# Patient Record
Sex: FEMALE | Race: WHITE | NOT HISPANIC OR LATINO | Employment: OTHER | URBAN - METROPOLITAN AREA
[De-identification: names, ages, dates, MRNs, and addresses within clinical notes are randomized per-mention and may not be internally consistent; named-entity substitution may affect disease eponyms.]

---

## 2019-05-26 ENCOUNTER — HOSPITAL ENCOUNTER (INPATIENT)
Facility: HOSPITAL | Age: 59
LOS: 6 days | Discharge: HOME HEALTH | DRG: 602 | End: 2019-06-01
Attending: EMERGENCY MEDICINE | Admitting: INTERNAL MEDICINE
Payer: MEDICARE

## 2019-05-26 ENCOUNTER — APPOINTMENT (INPATIENT)
Dept: RADIOLOGY | Facility: HOSPITAL | Age: 59
DRG: 602 | End: 2019-05-26
Payer: MEDICARE

## 2019-05-26 ENCOUNTER — APPOINTMENT (EMERGENCY)
Dept: RADIOLOGY | Facility: HOSPITAL | Age: 59
DRG: 602 | End: 2019-05-26
Payer: MEDICARE

## 2019-05-26 DIAGNOSIS — M54.9 CHRONIC BACK PAIN: ICD-10-CM

## 2019-05-26 DIAGNOSIS — M79.603 PAIN OF UPPER EXTREMITY, UNSPECIFIED LATERALITY: ICD-10-CM

## 2019-05-26 DIAGNOSIS — F19.10 IV DRUG ABUSE (HCC): ICD-10-CM

## 2019-05-26 DIAGNOSIS — M21.371 RIGHT FOOT DROP: ICD-10-CM

## 2019-05-26 DIAGNOSIS — F17.200 TOBACCO USE DISORDER: ICD-10-CM

## 2019-05-26 DIAGNOSIS — A41.01 MSSA (METHICILLIN SUSCEPTIBLE STAPHYLOCOCCUS AUREUS) SEPTICEMIA (HCC): ICD-10-CM

## 2019-05-26 DIAGNOSIS — L02.414 ABSCESS OF FOREARM, LEFT: ICD-10-CM

## 2019-05-26 DIAGNOSIS — S31.809A GLUTEAL CLEFT WOUND: Primary | ICD-10-CM

## 2019-05-26 DIAGNOSIS — L03.114 CELLULITIS OF LEFT ARM: ICD-10-CM

## 2019-05-26 DIAGNOSIS — M51.37 DDD (DEGENERATIVE DISC DISEASE), LUMBOSACRAL: ICD-10-CM

## 2019-05-26 DIAGNOSIS — F19.90 IVDU (INTRAVENOUS DRUG USER): ICD-10-CM

## 2019-05-26 DIAGNOSIS — M46.46 DISCITIS OF LUMBAR REGION: ICD-10-CM

## 2019-05-26 DIAGNOSIS — F14.10 COCAINE ABUSE (HCC): ICD-10-CM

## 2019-05-26 DIAGNOSIS — F33.9 MAJOR DEPRESSIVE DISORDER, RECURRENT EPISODE WITH ANXIOUS DISTRESS (HCC): ICD-10-CM

## 2019-05-26 DIAGNOSIS — M96.1 POST LAMINECTOMY SYNDROME: ICD-10-CM

## 2019-05-26 DIAGNOSIS — E53.8 B12 DEFICIENCY: ICD-10-CM

## 2019-05-26 DIAGNOSIS — J44.9 COPD (CHRONIC OBSTRUCTIVE PULMONARY DISEASE) WITH CHRONIC BRONCHITIS (HCC): ICD-10-CM

## 2019-05-26 DIAGNOSIS — F43.10 POSTTRAUMATIC STRESS DISORDER: ICD-10-CM

## 2019-05-26 DIAGNOSIS — Z91.14 CONTROLLED SUBSTANCE AGREEMENT BROKEN: ICD-10-CM

## 2019-05-26 DIAGNOSIS — R94.31 PROLONGED Q-T INTERVAL ON ECG: ICD-10-CM

## 2019-05-26 DIAGNOSIS — G89.29 CHRONIC BACK PAIN: ICD-10-CM

## 2019-05-26 DIAGNOSIS — F32.A DEPRESSION: ICD-10-CM

## 2019-05-26 PROBLEM — M86.9 OSTEOMYELITIS (HCC): Status: ACTIVE | Noted: 2017-10-05

## 2019-05-26 PROBLEM — Z91.148 CONTROLLED SUBSTANCE AGREEMENT BROKEN: Status: ACTIVE | Noted: 2017-01-09

## 2019-05-26 PROBLEM — T84.418A: Status: ACTIVE | Noted: 2019-02-27

## 2019-05-26 LAB
ALBUMIN SERPL BCP-MCNC: 3.1 G/DL (ref 3.5–5)
ALP SERPL-CCNC: 115 U/L (ref 46–116)
ALT SERPL W P-5'-P-CCNC: 86 U/L (ref 12–78)
ANION GAP SERPL CALCULATED.3IONS-SCNC: 5 MMOL/L (ref 4–13)
AST SERPL W P-5'-P-CCNC: 69 U/L (ref 5–45)
BASOPHILS # BLD AUTO: 0.09 THOUSANDS/ΜL (ref 0–0.1)
BASOPHILS NFR BLD AUTO: 1 % (ref 0–1)
BILIRUB SERPL-MCNC: 0.63 MG/DL (ref 0.2–1)
BUN SERPL-MCNC: 13 MG/DL (ref 5–25)
CALCIUM SERPL-MCNC: 9.1 MG/DL (ref 8.3–10.1)
CHLORIDE SERPL-SCNC: 104 MMOL/L (ref 100–108)
CO2 SERPL-SCNC: 27 MMOL/L (ref 21–32)
CREAT SERPL-MCNC: 0.62 MG/DL (ref 0.6–1.3)
EOSINOPHIL # BLD AUTO: 0.41 THOUSAND/ΜL (ref 0–0.61)
EOSINOPHIL NFR BLD AUTO: 4 % (ref 0–6)
ERYTHROCYTE [DISTWIDTH] IN BLOOD BY AUTOMATED COUNT: 14.1 % (ref 11.6–15.1)
GFR SERPL CREATININE-BSD FRML MDRD: 100 ML/MIN/1.73SQ M
GLUCOSE SERPL-MCNC: 115 MG/DL (ref 65–140)
HCT VFR BLD AUTO: 37.8 % (ref 34.8–46.1)
HGB BLD-MCNC: 12.1 G/DL (ref 11.5–15.4)
IMM GRANULOCYTES # BLD AUTO: 0.04 THOUSAND/UL (ref 0–0.2)
IMM GRANULOCYTES NFR BLD AUTO: 0 % (ref 0–2)
LYMPHOCYTES # BLD AUTO: 1.49 THOUSANDS/ΜL (ref 0.6–4.47)
LYMPHOCYTES NFR BLD AUTO: 15 % (ref 14–44)
MCH RBC QN AUTO: 27.9 PG (ref 26.8–34.3)
MCHC RBC AUTO-ENTMCNC: 32 G/DL (ref 31.4–37.4)
MCV RBC AUTO: 87 FL (ref 82–98)
MONOCYTES # BLD AUTO: 1.03 THOUSAND/ΜL (ref 0.17–1.22)
MONOCYTES NFR BLD AUTO: 10 % (ref 4–12)
NEUTROPHILS # BLD AUTO: 7.22 THOUSANDS/ΜL (ref 1.85–7.62)
NEUTS SEG NFR BLD AUTO: 70 % (ref 43–75)
NRBC BLD AUTO-RTO: 0 /100 WBCS
PLATELET # BLD AUTO: 305 THOUSANDS/UL (ref 149–390)
PMV BLD AUTO: 9.5 FL (ref 8.9–12.7)
POTASSIUM SERPL-SCNC: 3.2 MMOL/L (ref 3.5–5.3)
PROT SERPL-MCNC: 7.1 G/DL (ref 6.4–8.2)
RBC # BLD AUTO: 4.34 MILLION/UL (ref 3.81–5.12)
SODIUM SERPL-SCNC: 136 MMOL/L (ref 136–145)
WBC # BLD AUTO: 10.28 THOUSAND/UL (ref 4.31–10.16)

## 2019-05-26 PROCEDURE — 96374 THER/PROPH/DIAG INJ IV PUSH: CPT

## 2019-05-26 PROCEDURE — 87040 BLOOD CULTURE FOR BACTERIA: CPT | Performed by: INTERNAL MEDICINE

## 2019-05-26 PROCEDURE — 80053 COMPREHEN METABOLIC PANEL: CPT | Performed by: EMERGENCY MEDICINE

## 2019-05-26 PROCEDURE — 99285 EMERGENCY DEPT VISIT HI MDM: CPT

## 2019-05-26 PROCEDURE — 2W3DX1Z IMMOBILIZATION OF LEFT LOWER ARM USING SPLINT: ICD-10-PCS | Performed by: ORTHOPAEDIC SURGERY

## 2019-05-26 PROCEDURE — 99285 EMERGENCY DEPT VISIT HI MDM: CPT | Performed by: EMERGENCY MEDICINE

## 2019-05-26 PROCEDURE — 96375 TX/PRO/DX INJ NEW DRUG ADDON: CPT

## 2019-05-26 PROCEDURE — NS001 PR NO SIGNATURE OR ATTESTATION: Performed by: ORTHOPAEDIC SURGERY

## 2019-05-26 PROCEDURE — 99223 1ST HOSP IP/OBS HIGH 75: CPT | Performed by: INTERNAL MEDICINE

## 2019-05-26 PROCEDURE — 85025 COMPLETE CBC W/AUTO DIFF WBC: CPT | Performed by: EMERGENCY MEDICINE

## 2019-05-26 PROCEDURE — 36415 COLL VENOUS BLD VENIPUNCTURE: CPT | Performed by: EMERGENCY MEDICINE

## 2019-05-26 PROCEDURE — 73201 CT UPPER EXTREMITY W/DYE: CPT

## 2019-05-26 PROCEDURE — 73090 X-RAY EXAM OF FOREARM: CPT

## 2019-05-26 PROCEDURE — 0X9F3ZZ DRAINAGE OF LEFT LOWER ARM, PERCUTANEOUS APPROACH: ICD-10-PCS | Performed by: ORTHOPAEDIC SURGERY

## 2019-05-26 RX ORDER — OXYCODONE HYDROCHLORIDE 5 MG/1
5 TABLET ORAL ONCE
Status: COMPLETED | OUTPATIENT
Start: 2019-05-26 | End: 2019-05-26

## 2019-05-26 RX ORDER — QUETIAPINE FUMARATE 200 MG/1
200 TABLET, FILM COATED ORAL
Status: ON HOLD | COMMUNITY
End: 2019-05-31 | Stop reason: SDUPTHER

## 2019-05-26 RX ORDER — KETOROLAC TROMETHAMINE 30 MG/ML
15 INJECTION, SOLUTION INTRAMUSCULAR; INTRAVENOUS ONCE
Status: COMPLETED | OUTPATIENT
Start: 2019-05-26 | End: 2019-05-26

## 2019-05-26 RX ORDER — KETOROLAC TROMETHAMINE 30 MG/ML
15 INJECTION, SOLUTION INTRAMUSCULAR; INTRAVENOUS EVERY 6 HOURS PRN
Status: COMPLETED | OUTPATIENT
Start: 2019-05-26 | End: 2019-05-27

## 2019-05-26 RX ORDER — CLONIDINE HYDROCHLORIDE 0.2 MG/1
0.2 TABLET ORAL ONCE
Status: COMPLETED | OUTPATIENT
Start: 2019-05-26 | End: 2019-05-26

## 2019-05-26 RX ORDER — CARISOPRODOL 350 MG/1
350 TABLET ORAL 3 TIMES DAILY
Status: DISCONTINUED | OUTPATIENT
Start: 2019-05-26 | End: 2019-06-01 | Stop reason: HOSPADM

## 2019-05-26 RX ORDER — VENLAFAXINE HYDROCHLORIDE 150 MG/1
262.5 CAPSULE, EXTENDED RELEASE ORAL DAILY
COMMUNITY

## 2019-05-26 RX ORDER — VANCOMYCIN HYDROCHLORIDE 1 G/200ML
15 INJECTION, SOLUTION INTRAVENOUS ONCE
Status: COMPLETED | OUTPATIENT
Start: 2019-05-26 | End: 2019-05-26

## 2019-05-26 RX ORDER — VANCOMYCIN HYDROCHLORIDE 1 G/200ML
15 INJECTION, SOLUTION INTRAVENOUS EVERY 12 HOURS
Status: COMPLETED | OUTPATIENT
Start: 2019-05-27 | End: 2019-05-30

## 2019-05-26 RX ORDER — PREGABALIN 200 MG/1
200 CAPSULE ORAL 3 TIMES DAILY
Status: ON HOLD | COMMUNITY
End: 2019-05-31 | Stop reason: SDUPTHER

## 2019-05-26 RX ORDER — CHOLECALCIFEROL (VITAMIN D3) 125 MCG
CAPSULE ORAL
COMMUNITY
End: 2019-06-01 | Stop reason: HOSPADM

## 2019-05-26 RX ORDER — CARISOPRODOL 250 MG/1
250 TABLET ORAL 3 TIMES DAILY
Status: ON HOLD | COMMUNITY
End: 2019-05-31 | Stop reason: SDUPTHER

## 2019-05-26 RX ORDER — ACETAMINOPHEN 325 MG/1
975 TABLET ORAL EVERY 6 HOURS SCHEDULED
Status: DISCONTINUED | OUTPATIENT
Start: 2019-05-26 | End: 2019-05-28

## 2019-05-26 RX ORDER — QUETIAPINE FUMARATE 100 MG/1
200 TABLET, FILM COATED ORAL
Status: DISCONTINUED | OUTPATIENT
Start: 2019-05-26 | End: 2019-06-01 | Stop reason: HOSPADM

## 2019-05-26 RX ORDER — POTASSIUM CHLORIDE 20 MEQ/1
40 TABLET, EXTENDED RELEASE ORAL ONCE
Status: COMPLETED | OUTPATIENT
Start: 2019-05-26 | End: 2019-05-26

## 2019-05-26 RX ORDER — NORTRIPTYLINE HYDROCHLORIDE 10 MG/1
10 CAPSULE ORAL
Status: DISCONTINUED | OUTPATIENT
Start: 2019-05-26 | End: 2019-06-01 | Stop reason: HOSPADM

## 2019-05-26 RX ORDER — NORTRIPTYLINE HYDROCHLORIDE 10 MG/1
10 CAPSULE ORAL
Status: ON HOLD | COMMUNITY
End: 2019-05-31 | Stop reason: SDUPTHER

## 2019-05-26 RX ORDER — LIDOCAINE HYDROCHLORIDE 10 MG/ML
10 INJECTION, SOLUTION EPIDURAL; INFILTRATION; INTRACAUDAL; PERINEURAL ONCE
Status: COMPLETED | OUTPATIENT
Start: 2019-05-26 | End: 2019-05-26

## 2019-05-26 RX ORDER — LANOLIN ALCOHOL/MO/W.PET/CERES
3 CREAM (GRAM) TOPICAL
Status: DISCONTINUED | OUTPATIENT
Start: 2019-05-26 | End: 2019-06-01 | Stop reason: HOSPADM

## 2019-05-26 RX ORDER — PREGABALIN 100 MG/1
200 CAPSULE ORAL 3 TIMES DAILY
Status: DISCONTINUED | OUTPATIENT
Start: 2019-05-26 | End: 2019-06-01 | Stop reason: HOSPADM

## 2019-05-26 RX ORDER — VANCOMYCIN HYDROCHLORIDE 1 G/200ML
15 INJECTION, SOLUTION INTRAVENOUS EVERY 12 HOURS
Status: DISCONTINUED | OUTPATIENT
Start: 2019-05-26 | End: 2019-05-26

## 2019-05-26 RX ADMIN — MELATONIN 3 MG: at 21:13

## 2019-05-26 RX ADMIN — NORTRIPTYLINE HYDROCHLORIDE 10 MG: 10 CAPSULE ORAL at 22:10

## 2019-05-26 RX ADMIN — OXYCODONE HYDROCHLORIDE 5 MG: 5 TABLET ORAL at 20:11

## 2019-05-26 RX ADMIN — CARISOPRODOL 350 MG: 350 TABLET ORAL at 22:31

## 2019-05-26 RX ADMIN — QUETIAPINE FUMARATE 200 MG: 100 TABLET ORAL at 21:13

## 2019-05-26 RX ADMIN — KETOROLAC TROMETHAMINE 15 MG: 30 INJECTION, SOLUTION INTRAMUSCULAR; INTRAVENOUS at 21:12

## 2019-05-26 RX ADMIN — NICOTINE 1 PATCH: 7 PATCH TRANSDERMAL at 22:12

## 2019-05-26 RX ADMIN — PREGABALIN 200 MG: 100 CAPSULE ORAL at 21:13

## 2019-05-26 RX ADMIN — ACETAMINOPHEN 975 MG: 325 TABLET ORAL at 17:25

## 2019-05-26 RX ADMIN — LIDOCAINE HYDROCHLORIDE 10 ML: 10 INJECTION, SOLUTION EPIDURAL; INFILTRATION; INTRACAUDAL; PERINEURAL at 18:24

## 2019-05-26 RX ADMIN — AMPICILLIN SODIUM AND SULBACTAM SODIUM 3 G: 2; 1 INJECTION, POWDER, FOR SOLUTION INTRAMUSCULAR; INTRAVENOUS at 17:18

## 2019-05-26 RX ADMIN — IOHEXOL 100 ML: 350 INJECTION, SOLUTION INTRAVENOUS at 16:42

## 2019-05-26 RX ADMIN — CLONIDINE HYDROCHLORIDE 0.2 MG: 0.2 TABLET ORAL at 15:10

## 2019-05-26 RX ADMIN — VANCOMYCIN HYDROCHLORIDE 1000 MG: 1 INJECTION, SOLUTION INTRAVENOUS at 15:26

## 2019-05-26 RX ADMIN — KETOROLAC TROMETHAMINE 15 MG: 30 INJECTION, SOLUTION INTRAMUSCULAR at 15:10

## 2019-05-26 RX ADMIN — POTASSIUM CHLORIDE 40 MEQ: 1500 TABLET, EXTENDED RELEASE ORAL at 17:25

## 2019-05-27 PROBLEM — L02.414 ABSCESS OF FOREARM, LEFT: Status: ACTIVE | Noted: 2019-05-26

## 2019-05-27 PROBLEM — B19.20 HEPATITIS C: Status: ACTIVE | Noted: 2019-05-27

## 2019-05-27 LAB
ANION GAP SERPL CALCULATED.3IONS-SCNC: 5 MMOL/L (ref 4–13)
BUN SERPL-MCNC: 14 MG/DL (ref 5–25)
CALCIUM SERPL-MCNC: 8.2 MG/DL (ref 8.3–10.1)
CHLORIDE SERPL-SCNC: 104 MMOL/L (ref 100–108)
CO2 SERPL-SCNC: 29 MMOL/L (ref 21–32)
CREAT SERPL-MCNC: 0.77 MG/DL (ref 0.6–1.3)
ERYTHROCYTE [DISTWIDTH] IN BLOOD BY AUTOMATED COUNT: 14.1 % (ref 11.6–15.1)
GFR SERPL CREATININE-BSD FRML MDRD: 86 ML/MIN/1.73SQ M
GLUCOSE SERPL-MCNC: 124 MG/DL (ref 65–140)
HCT VFR BLD AUTO: 35.1 % (ref 34.8–46.1)
HGB BLD-MCNC: 11.2 G/DL (ref 11.5–15.4)
MAGNESIUM SERPL-MCNC: 2 MG/DL (ref 1.6–2.6)
MCH RBC QN AUTO: 28 PG (ref 26.8–34.3)
MCHC RBC AUTO-ENTMCNC: 31.9 G/DL (ref 31.4–37.4)
MCV RBC AUTO: 88 FL (ref 82–98)
PHOSPHATE SERPL-MCNC: 3.1 MG/DL (ref 2.7–4.5)
PLATELET # BLD AUTO: 278 THOUSANDS/UL (ref 149–390)
PMV BLD AUTO: 9.9 FL (ref 8.9–12.7)
POTASSIUM SERPL-SCNC: 2.5 MMOL/L (ref 3.5–5.3)
RBC # BLD AUTO: 4 MILLION/UL (ref 3.81–5.12)
SODIUM SERPL-SCNC: 138 MMOL/L (ref 136–145)
WBC # BLD AUTO: 8.95 THOUSAND/UL (ref 4.31–10.16)

## 2019-05-27 PROCEDURE — 99232 SBSQ HOSP IP/OBS MODERATE 35: CPT | Performed by: GENERAL PRACTICE

## 2019-05-27 PROCEDURE — NS001 PR NO SIGNATURE OR ATTESTATION: Performed by: ORTHOPAEDIC SURGERY

## 2019-05-27 PROCEDURE — 83735 ASSAY OF MAGNESIUM: CPT | Performed by: INTERNAL MEDICINE

## 2019-05-27 PROCEDURE — 80048 BASIC METABOLIC PNL TOTAL CA: CPT | Performed by: INTERNAL MEDICINE

## 2019-05-27 PROCEDURE — 85027 COMPLETE CBC AUTOMATED: CPT | Performed by: INTERNAL MEDICINE

## 2019-05-27 PROCEDURE — 84100 ASSAY OF PHOSPHORUS: CPT | Performed by: INTERNAL MEDICINE

## 2019-05-27 RX ORDER — POTASSIUM CHLORIDE 20 MEQ/1
40 TABLET, EXTENDED RELEASE ORAL ONCE
Status: COMPLETED | OUTPATIENT
Start: 2019-05-27 | End: 2019-05-27

## 2019-05-27 RX ORDER — POTASSIUM CHLORIDE 14.9 MG/ML
20 INJECTION INTRAVENOUS ONCE
Status: COMPLETED | OUTPATIENT
Start: 2019-05-27 | End: 2019-05-27

## 2019-05-27 RX ORDER — CLONIDINE HYDROCHLORIDE 0.1 MG/1
0.2 TABLET ORAL ONCE
Status: COMPLETED | OUTPATIENT
Start: 2019-05-27 | End: 2019-05-27

## 2019-05-27 RX ORDER — KETOROLAC TROMETHAMINE 30 MG/ML
15 INJECTION, SOLUTION INTRAMUSCULAR; INTRAVENOUS EVERY 6 HOURS PRN
Status: DISCONTINUED | OUTPATIENT
Start: 2019-05-27 | End: 2019-05-28

## 2019-05-27 RX ADMIN — POTASSIUM CHLORIDE 40 MEQ: 1500 TABLET, EXTENDED RELEASE ORAL at 15:42

## 2019-05-27 RX ADMIN — POTASSIUM CHLORIDE 40 MEQ: 1500 TABLET, EXTENDED RELEASE ORAL at 08:07

## 2019-05-27 RX ADMIN — AMPICILLIN SODIUM AND SULBACTAM SODIUM 3 G: 2; 1 INJECTION, POWDER, FOR SOLUTION INTRAMUSCULAR; INTRAVENOUS at 17:17

## 2019-05-27 RX ADMIN — POTASSIUM CHLORIDE 20 MEQ: 200 INJECTION, SOLUTION INTRAVENOUS at 08:07

## 2019-05-27 RX ADMIN — MELATONIN 3 MG: at 22:24

## 2019-05-27 RX ADMIN — CLONIDINE HYDROCHLORIDE 0.2 MG: 0.1 TABLET ORAL at 20:01

## 2019-05-27 RX ADMIN — KETOROLAC TROMETHAMINE 15 MG: 30 INJECTION, SOLUTION INTRAMUSCULAR; INTRAVENOUS at 03:37

## 2019-05-27 RX ADMIN — PREGABALIN 200 MG: 100 CAPSULE ORAL at 15:42

## 2019-05-27 RX ADMIN — PREGABALIN 200 MG: 100 CAPSULE ORAL at 22:22

## 2019-05-27 RX ADMIN — CARISOPRODOL 350 MG: 350 TABLET ORAL at 08:07

## 2019-05-27 RX ADMIN — CARISOPRODOL 350 MG: 350 TABLET ORAL at 15:42

## 2019-05-27 RX ADMIN — ACETAMINOPHEN 975 MG: 325 TABLET ORAL at 06:07

## 2019-05-27 RX ADMIN — QUETIAPINE FUMARATE 200 MG: 100 TABLET ORAL at 22:25

## 2019-05-27 RX ADMIN — CARISOPRODOL 350 MG: 350 TABLET ORAL at 22:31

## 2019-05-27 RX ADMIN — KETOROLAC TROMETHAMINE 15 MG: 30 INJECTION, SOLUTION INTRAMUSCULAR; INTRAVENOUS at 09:39

## 2019-05-27 RX ADMIN — NICOTINE 1 PATCH: 7 PATCH TRANSDERMAL at 22:24

## 2019-05-27 RX ADMIN — ENOXAPARIN SODIUM 40 MG: 40 INJECTION SUBCUTANEOUS at 08:07

## 2019-05-27 RX ADMIN — ACETAMINOPHEN 975 MG: 325 TABLET ORAL at 00:20

## 2019-05-27 RX ADMIN — VENLAFAXINE HYDROCHLORIDE 262.5 MG: 75 CAPSULE, EXTENDED RELEASE ORAL at 08:08

## 2019-05-27 RX ADMIN — AMPICILLIN SODIUM AND SULBACTAM SODIUM 3 G: 2; 1 INJECTION, POWDER, FOR SOLUTION INTRAMUSCULAR; INTRAVENOUS at 22:24

## 2019-05-27 RX ADMIN — VANCOMYCIN HYDROCHLORIDE 1000 MG: 1 INJECTION, SOLUTION INTRAVENOUS at 04:41

## 2019-05-27 RX ADMIN — AMPICILLIN SODIUM AND SULBACTAM SODIUM 3 G: 2; 1 INJECTION, POWDER, FOR SOLUTION INTRAMUSCULAR; INTRAVENOUS at 00:19

## 2019-05-27 RX ADMIN — VANCOMYCIN HYDROCHLORIDE 1000 MG: 1 INJECTION, SOLUTION INTRAVENOUS at 15:43

## 2019-05-27 RX ADMIN — AMPICILLIN SODIUM AND SULBACTAM SODIUM 3 G: 2; 1 INJECTION, POWDER, FOR SOLUTION INTRAMUSCULAR; INTRAVENOUS at 06:54

## 2019-05-27 RX ADMIN — ACETAMINOPHEN 975 MG: 325 TABLET ORAL at 11:58

## 2019-05-27 RX ADMIN — KETOROLAC TROMETHAMINE 15 MG: 30 INJECTION, SOLUTION INTRAMUSCULAR at 16:18

## 2019-05-27 RX ADMIN — ACETAMINOPHEN 975 MG: 325 TABLET ORAL at 17:18

## 2019-05-27 RX ADMIN — AMPICILLIN SODIUM AND SULBACTAM SODIUM 3 G: 2; 1 INJECTION, POWDER, FOR SOLUTION INTRAMUSCULAR; INTRAVENOUS at 11:58

## 2019-05-27 RX ADMIN — NORTRIPTYLINE HYDROCHLORIDE 10 MG: 10 CAPSULE ORAL at 22:25

## 2019-05-27 RX ADMIN — PREGABALIN 200 MG: 100 CAPSULE ORAL at 08:07

## 2019-05-28 PROBLEM — E87.6 HYPOKALEMIA: Status: ACTIVE | Noted: 2019-05-28

## 2019-05-28 PROBLEM — L89.90 DECUBITUS ULCER: Status: ACTIVE | Noted: 2019-05-28

## 2019-05-28 LAB
ALBUMIN SERPL BCP-MCNC: 2.6 G/DL (ref 3.5–5)
ALP SERPL-CCNC: 123 U/L (ref 46–116)
ALT SERPL W P-5'-P-CCNC: 65 U/L (ref 12–78)
ANION GAP SERPL CALCULATED.3IONS-SCNC: 4 MMOL/L (ref 4–13)
AST SERPL W P-5'-P-CCNC: 45 U/L (ref 5–45)
BILIRUB SERPL-MCNC: 0.25 MG/DL (ref 0.2–1)
BUN SERPL-MCNC: 10 MG/DL (ref 5–25)
CALCIUM SERPL-MCNC: 8.6 MG/DL (ref 8.3–10.1)
CHLORIDE SERPL-SCNC: 109 MMOL/L (ref 100–108)
CO2 SERPL-SCNC: 27 MMOL/L (ref 21–32)
CREAT SERPL-MCNC: 0.56 MG/DL (ref 0.6–1.3)
GFR SERPL CREATININE-BSD FRML MDRD: 104 ML/MIN/1.73SQ M
GLUCOSE SERPL-MCNC: 92 MG/DL (ref 65–140)
POTASSIUM SERPL-SCNC: 3.1 MMOL/L (ref 3.5–5.3)
PROT SERPL-MCNC: 6.2 G/DL (ref 6.4–8.2)
SODIUM SERPL-SCNC: 140 MMOL/L (ref 136–145)
VANCOMYCIN TROUGH SERPL-MCNC: 16.5 UG/ML (ref 10–20)

## 2019-05-28 PROCEDURE — 80202 ASSAY OF VANCOMYCIN: CPT | Performed by: INTERNAL MEDICINE

## 2019-05-28 PROCEDURE — 99232 SBSQ HOSP IP/OBS MODERATE 35: CPT | Performed by: INTERNAL MEDICINE

## 2019-05-28 PROCEDURE — G8978 MOBILITY CURRENT STATUS: HCPCS

## 2019-05-28 PROCEDURE — 80053 COMPREHEN METABOLIC PANEL: CPT | Performed by: GENERAL PRACTICE

## 2019-05-28 PROCEDURE — 99222 1ST HOSP IP/OBS MODERATE 55: CPT | Performed by: NURSE PRACTITIONER

## 2019-05-28 PROCEDURE — NS001 PR NO SIGNATURE OR ATTESTATION: Performed by: ORTHOPAEDIC SURGERY

## 2019-05-28 PROCEDURE — G8979 MOBILITY GOAL STATUS: HCPCS

## 2019-05-28 PROCEDURE — 97163 PT EVAL HIGH COMPLEX 45 MIN: CPT

## 2019-05-28 RX ORDER — ACETAMINOPHEN 325 MG/1
650 TABLET ORAL EVERY 6 HOURS PRN
Status: DISCONTINUED | OUTPATIENT
Start: 2019-05-28 | End: 2019-06-01 | Stop reason: HOSPADM

## 2019-05-28 RX ORDER — CALCIUM CARBONATE 200(500)MG
500 TABLET,CHEWABLE ORAL 3 TIMES DAILY PRN
Status: DISCONTINUED | OUTPATIENT
Start: 2019-05-28 | End: 2019-06-01 | Stop reason: HOSPADM

## 2019-05-28 RX ORDER — KETOROLAC TROMETHAMINE 30 MG/ML
30 INJECTION, SOLUTION INTRAMUSCULAR; INTRAVENOUS EVERY 6 HOURS PRN
Status: DISCONTINUED | OUTPATIENT
Start: 2019-05-28 | End: 2019-05-29

## 2019-05-28 RX ORDER — GLYCOPYRROLATE 0.2 MG/ML
0.2 INJECTION INTRAMUSCULAR; INTRAVENOUS EVERY 4 HOURS PRN
Status: DISCONTINUED | OUTPATIENT
Start: 2019-05-28 | End: 2019-05-31

## 2019-05-28 RX ORDER — KETOROLAC TROMETHAMINE 30 MG/ML
15 INJECTION, SOLUTION INTRAMUSCULAR; INTRAVENOUS ONCE
Status: COMPLETED | OUTPATIENT
Start: 2019-05-28 | End: 2019-05-28

## 2019-05-28 RX ORDER — POTASSIUM CHLORIDE 20 MEQ/1
40 TABLET, EXTENDED RELEASE ORAL 2 TIMES DAILY
Status: COMPLETED | OUTPATIENT
Start: 2019-05-28 | End: 2019-05-29

## 2019-05-28 RX ORDER — KETOROLAC TROMETHAMINE 30 MG/ML
30 INJECTION, SOLUTION INTRAMUSCULAR; INTRAVENOUS EVERY 6 HOURS PRN
Status: DISPENSED | OUTPATIENT
Start: 2019-05-28 | End: 2019-05-28

## 2019-05-28 RX ORDER — BUPRENORPHINE AND NALOXONE 8; 2 MG/1; MG/1
1 FILM, SOLUBLE BUCCAL; SUBLINGUAL 2 TIMES DAILY
Status: DISCONTINUED | OUTPATIENT
Start: 2019-05-28 | End: 2019-06-01 | Stop reason: HOSPADM

## 2019-05-28 RX ORDER — BUPRENORPHINE AND NALOXONE 8; 2 MG/1; MG/1
1 FILM, SOLUBLE BUCCAL; SUBLINGUAL DAILY
Status: DISCONTINUED | OUTPATIENT
Start: 2019-05-28 | End: 2019-05-28

## 2019-05-28 RX ORDER — LORAZEPAM 2 MG/ML
0.5 INJECTION INTRAMUSCULAR ONCE AS NEEDED
Status: DISCONTINUED | OUTPATIENT
Start: 2019-05-28 | End: 2019-05-31

## 2019-05-28 RX ADMIN — BUPRENORPHINE HYDROCHLORIDE, NALOXONE HYDROCHLORIDE 1 FILM: 8; 2 FILM, SOLUBLE BUCCAL; SUBLINGUAL at 17:04

## 2019-05-28 RX ADMIN — CALCIUM CARBONATE (ANTACID) CHEW TAB 500 MG 500 MG: 500 CHEW TAB at 17:13

## 2019-05-28 RX ADMIN — AMPICILLIN SODIUM AND SULBACTAM SODIUM 3 G: 2; 1 INJECTION, POWDER, FOR SOLUTION INTRAMUSCULAR; INTRAVENOUS at 17:04

## 2019-05-28 RX ADMIN — VANCOMYCIN HYDROCHLORIDE 1000 MG: 1 INJECTION, SOLUTION INTRAVENOUS at 17:51

## 2019-05-28 RX ADMIN — ACETAMINOPHEN 975 MG: 325 TABLET ORAL at 02:12

## 2019-05-28 RX ADMIN — AMPICILLIN SODIUM AND SULBACTAM SODIUM 3 G: 2; 1 INJECTION, POWDER, FOR SOLUTION INTRAMUSCULAR; INTRAVENOUS at 11:07

## 2019-05-28 RX ADMIN — PREGABALIN 200 MG: 100 CAPSULE ORAL at 21:06

## 2019-05-28 RX ADMIN — VENLAFAXINE HYDROCHLORIDE 262.5 MG: 75 CAPSULE, EXTENDED RELEASE ORAL at 08:21

## 2019-05-28 RX ADMIN — KETOROLAC TROMETHAMINE 15 MG: 30 INJECTION, SOLUTION INTRAMUSCULAR at 02:12

## 2019-05-28 RX ADMIN — PREGABALIN 200 MG: 100 CAPSULE ORAL at 17:04

## 2019-05-28 RX ADMIN — CALCIUM CARBONATE (ANTACID) CHEW TAB 500 MG 500 MG: 500 CHEW TAB at 20:54

## 2019-05-28 RX ADMIN — NORTRIPTYLINE HYDROCHLORIDE 10 MG: 10 CAPSULE ORAL at 22:34

## 2019-05-28 RX ADMIN — ENOXAPARIN SODIUM 40 MG: 40 INJECTION SUBCUTANEOUS at 08:22

## 2019-05-28 RX ADMIN — KETOROLAC TROMETHAMINE 30 MG: 30 INJECTION, SOLUTION INTRAMUSCULAR at 21:05

## 2019-05-28 RX ADMIN — POTASSIUM CHLORIDE 40 MEQ: 1500 TABLET, EXTENDED RELEASE ORAL at 11:07

## 2019-05-28 RX ADMIN — AMPICILLIN SODIUM AND SULBACTAM SODIUM 3 G: 2; 1 INJECTION, POWDER, FOR SOLUTION INTRAMUSCULAR; INTRAVENOUS at 22:37

## 2019-05-28 RX ADMIN — MELATONIN 3 MG: at 22:34

## 2019-05-28 RX ADMIN — KETOROLAC TROMETHAMINE 15 MG: 30 INJECTION, SOLUTION INTRAMUSCULAR at 11:10

## 2019-05-28 RX ADMIN — PREGABALIN 200 MG: 100 CAPSULE ORAL at 08:22

## 2019-05-28 RX ADMIN — SODIUM CHLORIDE 0.1 MG/KG/HR: 0.9 INJECTION, SOLUTION INTRAVENOUS at 16:11

## 2019-05-28 RX ADMIN — AMPICILLIN SODIUM AND SULBACTAM SODIUM 3 G: 2; 1 INJECTION, POWDER, FOR SOLUTION INTRAMUSCULAR; INTRAVENOUS at 05:20

## 2019-05-28 RX ADMIN — CARISOPRODOL 350 MG: 350 TABLET ORAL at 17:04

## 2019-05-28 RX ADMIN — CARISOPRODOL 350 MG: 350 TABLET ORAL at 21:08

## 2019-05-28 RX ADMIN — CARISOPRODOL 350 MG: 350 TABLET ORAL at 08:21

## 2019-05-28 RX ADMIN — ACETAMINOPHEN 975 MG: 325 TABLET ORAL at 11:07

## 2019-05-28 RX ADMIN — POTASSIUM CHLORIDE 40 MEQ: 1500 TABLET, EXTENDED RELEASE ORAL at 17:04

## 2019-05-28 RX ADMIN — QUETIAPINE FUMARATE 200 MG: 100 TABLET ORAL at 22:34

## 2019-05-28 RX ADMIN — VANCOMYCIN HYDROCHLORIDE 1000 MG: 1 INJECTION, SOLUTION INTRAVENOUS at 05:56

## 2019-05-28 RX ADMIN — KETOROLAC TROMETHAMINE 30 MG: 30 INJECTION, SOLUTION INTRAMUSCULAR at 14:24

## 2019-05-28 RX ADMIN — BUPRENORPHINE HYDROCHLORIDE, NALOXONE HYDROCHLORIDE 1 FILM: 8; 2 FILM, SOLUBLE BUCCAL; SUBLINGUAL at 11:46

## 2019-05-28 RX ADMIN — KETOROLAC TROMETHAMINE 15 MG: 30 INJECTION, SOLUTION INTRAMUSCULAR at 08:22

## 2019-05-28 RX ADMIN — NICOTINE 1 PATCH: 7 PATCH TRANSDERMAL at 21:06

## 2019-05-29 LAB
ANION GAP SERPL CALCULATED.3IONS-SCNC: 11 MMOL/L (ref 4–13)
BUN SERPL-MCNC: 9 MG/DL (ref 5–25)
CALCIUM SERPL-MCNC: 8.5 MG/DL (ref 8.3–10.1)
CHLORIDE SERPL-SCNC: 109 MMOL/L (ref 100–108)
CO2 SERPL-SCNC: 24 MMOL/L (ref 21–32)
CREAT SERPL-MCNC: 0.61 MG/DL (ref 0.6–1.3)
GFR SERPL CREATININE-BSD FRML MDRD: 101 ML/MIN/1.73SQ M
GLUCOSE SERPL-MCNC: 141 MG/DL (ref 65–140)
MAGNESIUM SERPL-MCNC: 2 MG/DL (ref 1.6–2.6)
PHOSPHATE SERPL-MCNC: 2.5 MG/DL (ref 2.7–4.5)
POTASSIUM SERPL-SCNC: 3.2 MMOL/L (ref 3.5–5.3)
SODIUM SERPL-SCNC: 144 MMOL/L (ref 136–145)
VANCOMYCIN TROUGH SERPL-MCNC: 13.8 UG/ML (ref 10–20)

## 2019-05-29 PROCEDURE — 99232 SBSQ HOSP IP/OBS MODERATE 35: CPT | Performed by: NURSE PRACTITIONER

## 2019-05-29 PROCEDURE — 84100 ASSAY OF PHOSPHORUS: CPT | Performed by: INTERNAL MEDICINE

## 2019-05-29 PROCEDURE — 83735 ASSAY OF MAGNESIUM: CPT | Performed by: INTERNAL MEDICINE

## 2019-05-29 PROCEDURE — 80202 ASSAY OF VANCOMYCIN: CPT | Performed by: INTERNAL MEDICINE

## 2019-05-29 PROCEDURE — 80048 BASIC METABOLIC PNL TOTAL CA: CPT | Performed by: INTERNAL MEDICINE

## 2019-05-29 PROCEDURE — 99232 SBSQ HOSP IP/OBS MODERATE 35: CPT | Performed by: INTERNAL MEDICINE

## 2019-05-29 RX ORDER — KETOROLAC TROMETHAMINE 30 MG/ML
15 INJECTION, SOLUTION INTRAMUSCULAR; INTRAVENOUS EVERY 6 HOURS PRN
Status: COMPLETED | OUTPATIENT
Start: 2019-05-29 | End: 2019-05-30

## 2019-05-29 RX ORDER — IBUPROFEN 400 MG/1
400 TABLET ORAL EVERY 6 HOURS PRN
Status: DISCONTINUED | OUTPATIENT
Start: 2019-05-29 | End: 2019-06-01 | Stop reason: HOSPADM

## 2019-05-29 RX ORDER — IBUPROFEN 400 MG/1
400 TABLET ORAL EVERY 6 HOURS PRN
Status: DISCONTINUED | OUTPATIENT
Start: 2019-05-29 | End: 2019-05-29

## 2019-05-29 RX ADMIN — NORTRIPTYLINE HYDROCHLORIDE 10 MG: 10 CAPSULE ORAL at 22:36

## 2019-05-29 RX ADMIN — DIBASIC SODIUM PHOSPHATE, MONOBASIC POTASSIUM PHOSPHATE AND MONOBASIC SODIUM PHOSPHATE 1 TABLET: 852; 155; 130 TABLET ORAL at 17:18

## 2019-05-29 RX ADMIN — CARISOPRODOL 350 MG: 350 TABLET ORAL at 09:59

## 2019-05-29 RX ADMIN — IBUPROFEN 400 MG: 400 TABLET ORAL at 07:54

## 2019-05-29 RX ADMIN — BUPRENORPHINE HYDROCHLORIDE, NALOXONE HYDROCHLORIDE 1 FILM: 8; 2 FILM, SOLUBLE BUCCAL; SUBLINGUAL at 17:18

## 2019-05-29 RX ADMIN — ENOXAPARIN SODIUM 40 MG: 40 INJECTION SUBCUTANEOUS at 09:52

## 2019-05-29 RX ADMIN — CALCIUM CARBONATE (ANTACID) CHEW TAB 500 MG 500 MG: 500 CHEW TAB at 17:19

## 2019-05-29 RX ADMIN — VANCOMYCIN HYDROCHLORIDE 1000 MG: 1 INJECTION, SOLUTION INTRAVENOUS at 17:19

## 2019-05-29 RX ADMIN — KETOROLAC TROMETHAMINE 15 MG: 30 INJECTION, SOLUTION INTRAMUSCULAR at 18:39

## 2019-05-29 RX ADMIN — NICOTINE 1 PATCH: 7 PATCH TRANSDERMAL at 20:35

## 2019-05-29 RX ADMIN — IBUPROFEN 400 MG: 400 TABLET ORAL at 15:01

## 2019-05-29 RX ADMIN — POTASSIUM CHLORIDE 40 MEQ: 1500 TABLET, EXTENDED RELEASE ORAL at 09:52

## 2019-05-29 RX ADMIN — VENLAFAXINE HYDROCHLORIDE 262.5 MG: 75 CAPSULE, EXTENDED RELEASE ORAL at 09:52

## 2019-05-29 RX ADMIN — AMPICILLIN SODIUM AND SULBACTAM SODIUM 3 G: 2; 1 INJECTION, POWDER, FOR SOLUTION INTRAMUSCULAR; INTRAVENOUS at 18:36

## 2019-05-29 RX ADMIN — DIBASIC SODIUM PHOSPHATE, MONOBASIC POTASSIUM PHOSPHATE AND MONOBASIC SODIUM PHOSPHATE 1 TABLET: 852; 155; 130 TABLET ORAL at 13:42

## 2019-05-29 RX ADMIN — KETOROLAC TROMETHAMINE 15 MG: 30 INJECTION, SOLUTION INTRAMUSCULAR at 10:38

## 2019-05-29 RX ADMIN — BUPRENORPHINE HYDROCHLORIDE, NALOXONE HYDROCHLORIDE 1 FILM: 8; 2 FILM, SOLUBLE BUCCAL; SUBLINGUAL at 09:59

## 2019-05-29 RX ADMIN — PREGABALIN 200 MG: 100 CAPSULE ORAL at 09:52

## 2019-05-29 RX ADMIN — PREGABALIN 200 MG: 100 CAPSULE ORAL at 17:18

## 2019-05-29 RX ADMIN — QUETIAPINE FUMARATE 200 MG: 100 TABLET ORAL at 22:36

## 2019-05-29 RX ADMIN — AMPICILLIN SODIUM AND SULBACTAM SODIUM 3 G: 2; 1 INJECTION, POWDER, FOR SOLUTION INTRAMUSCULAR; INTRAVENOUS at 13:42

## 2019-05-29 RX ADMIN — VANCOMYCIN HYDROCHLORIDE 1000 MG: 1 INJECTION, SOLUTION INTRAVENOUS at 04:10

## 2019-05-29 RX ADMIN — CARISOPRODOL 350 MG: 350 TABLET ORAL at 17:18

## 2019-05-29 RX ADMIN — MELATONIN 3 MG: at 22:30

## 2019-05-29 RX ADMIN — KETOROLAC TROMETHAMINE 15 MG: 30 INJECTION, SOLUTION INTRAMUSCULAR at 04:15

## 2019-05-29 RX ADMIN — AMPICILLIN SODIUM AND SULBACTAM SODIUM 3 G: 2; 1 INJECTION, POWDER, FOR SOLUTION INTRAMUSCULAR; INTRAVENOUS at 06:35

## 2019-05-29 RX ADMIN — PREGABALIN 200 MG: 100 CAPSULE ORAL at 20:35

## 2019-05-29 RX ADMIN — CARISOPRODOL 350 MG: 350 TABLET ORAL at 20:35

## 2019-05-30 LAB
ANION GAP SERPL CALCULATED.3IONS-SCNC: 6 MMOL/L (ref 4–13)
BUN SERPL-MCNC: 8 MG/DL (ref 5–25)
CALCIUM SERPL-MCNC: 8.1 MG/DL (ref 8.3–10.1)
CHLORIDE SERPL-SCNC: 108 MMOL/L (ref 100–108)
CO2 SERPL-SCNC: 27 MMOL/L (ref 21–32)
CREAT SERPL-MCNC: 0.62 MG/DL (ref 0.6–1.3)
GFR SERPL CREATININE-BSD FRML MDRD: 100 ML/MIN/1.73SQ M
GLUCOSE SERPL-MCNC: 78 MG/DL (ref 65–140)
MAGNESIUM SERPL-MCNC: 2 MG/DL (ref 1.6–2.6)
PHOSPHATE SERPL-MCNC: 3.6 MG/DL (ref 2.7–4.5)
POTASSIUM SERPL-SCNC: 3.6 MMOL/L (ref 3.5–5.3)
SODIUM SERPL-SCNC: 141 MMOL/L (ref 136–145)

## 2019-05-30 PROCEDURE — 80048 BASIC METABOLIC PNL TOTAL CA: CPT | Performed by: INTERNAL MEDICINE

## 2019-05-30 PROCEDURE — 97110 THERAPEUTIC EXERCISES: CPT

## 2019-05-30 PROCEDURE — 99232 SBSQ HOSP IP/OBS MODERATE 35: CPT | Performed by: INTERNAL MEDICINE

## 2019-05-30 PROCEDURE — 83735 ASSAY OF MAGNESIUM: CPT | Performed by: INTERNAL MEDICINE

## 2019-05-30 PROCEDURE — 97535 SELF CARE MNGMENT TRAINING: CPT

## 2019-05-30 PROCEDURE — G8987 SELF CARE CURRENT STATUS: HCPCS

## 2019-05-30 PROCEDURE — 97166 OT EVAL MOD COMPLEX 45 MIN: CPT

## 2019-05-30 PROCEDURE — 99232 SBSQ HOSP IP/OBS MODERATE 35: CPT | Performed by: NURSE PRACTITIONER

## 2019-05-30 PROCEDURE — 84100 ASSAY OF PHOSPHORUS: CPT | Performed by: INTERNAL MEDICINE

## 2019-05-30 PROCEDURE — G8988 SELF CARE GOAL STATUS: HCPCS

## 2019-05-30 RX ORDER — DOXYCYCLINE HYCLATE 100 MG/1
100 CAPSULE ORAL EVERY 12 HOURS SCHEDULED
Status: DISCONTINUED | OUTPATIENT
Start: 2019-05-31 | End: 2019-05-30

## 2019-05-30 RX ORDER — DOXYCYCLINE HYCLATE 100 MG/1
100 CAPSULE ORAL EVERY 12 HOURS SCHEDULED
Status: DISCONTINUED | OUTPATIENT
Start: 2019-05-31 | End: 2019-06-01 | Stop reason: HOSPADM

## 2019-05-30 RX ORDER — LABETALOL 20 MG/4 ML (5 MG/ML) INTRAVENOUS SYRINGE
10 EVERY 6 HOURS PRN
Status: DISCONTINUED | OUTPATIENT
Start: 2019-05-30 | End: 2019-06-01 | Stop reason: HOSPADM

## 2019-05-30 RX ADMIN — DIBASIC SODIUM PHOSPHATE, MONOBASIC POTASSIUM PHOSPHATE AND MONOBASIC SODIUM PHOSPHATE 1 TABLET: 852; 155; 130 TABLET ORAL at 07:40

## 2019-05-30 RX ADMIN — SODIUM CHLORIDE 3 G: 9 INJECTION, SOLUTION INTRAVENOUS at 19:19

## 2019-05-30 RX ADMIN — SODIUM CHLORIDE 0.1 MG/KG/HR: 0.9 INJECTION, SOLUTION INTRAVENOUS at 04:17

## 2019-05-30 RX ADMIN — ACETAMINOPHEN 650 MG: 325 TABLET ORAL at 13:35

## 2019-05-30 RX ADMIN — AMPICILLIN SODIUM AND SULBACTAM SODIUM 3 G: 2; 1 INJECTION, POWDER, FOR SOLUTION INTRAMUSCULAR; INTRAVENOUS at 05:48

## 2019-05-30 RX ADMIN — PREGABALIN 200 MG: 100 CAPSULE ORAL at 16:03

## 2019-05-30 RX ADMIN — QUETIAPINE FUMARATE 200 MG: 100 TABLET ORAL at 22:51

## 2019-05-30 RX ADMIN — DIBASIC SODIUM PHOSPHATE, MONOBASIC POTASSIUM PHOSPHATE AND MONOBASIC SODIUM PHOSPHATE 1 TABLET: 852; 155; 130 TABLET ORAL at 16:03

## 2019-05-30 RX ADMIN — MELATONIN 3 MG: at 22:51

## 2019-05-30 RX ADMIN — DIBASIC SODIUM PHOSPHATE, MONOBASIC POTASSIUM PHOSPHATE AND MONOBASIC SODIUM PHOSPHATE 1 TABLET: 852; 155; 130 TABLET ORAL at 11:48

## 2019-05-30 RX ADMIN — AMPICILLIN SODIUM AND SULBACTAM SODIUM 3 G: 2; 1 INJECTION, POWDER, FOR SOLUTION INTRAMUSCULAR; INTRAVENOUS at 11:51

## 2019-05-30 RX ADMIN — LABETALOL 20 MG/4 ML (5 MG/ML) INTRAVENOUS SYRINGE 10 MG: at 22:52

## 2019-05-30 RX ADMIN — CARISOPRODOL 350 MG: 350 TABLET ORAL at 16:03

## 2019-05-30 RX ADMIN — CARISOPRODOL 350 MG: 350 TABLET ORAL at 20:42

## 2019-05-30 RX ADMIN — AMPICILLIN SODIUM AND SULBACTAM SODIUM 3 G: 2; 1 INJECTION, POWDER, FOR SOLUTION INTRAMUSCULAR; INTRAVENOUS at 00:10

## 2019-05-30 RX ADMIN — VANCOMYCIN HYDROCHLORIDE 1000 MG: 1 INJECTION, SOLUTION INTRAVENOUS at 17:57

## 2019-05-30 RX ADMIN — VANCOMYCIN HYDROCHLORIDE 1000 MG: 1 INJECTION, SOLUTION INTRAVENOUS at 04:37

## 2019-05-30 RX ADMIN — ACETAMINOPHEN 650 MG: 325 TABLET ORAL at 22:51

## 2019-05-30 RX ADMIN — BUPRENORPHINE HYDROCHLORIDE, NALOXONE HYDROCHLORIDE 1 FILM: 8; 2 FILM, SOLUBLE BUCCAL; SUBLINGUAL at 10:16

## 2019-05-30 RX ADMIN — PREGABALIN 200 MG: 100 CAPSULE ORAL at 10:16

## 2019-05-30 RX ADMIN — IBUPROFEN 400 MG: 400 TABLET ORAL at 20:42

## 2019-05-30 RX ADMIN — BUPRENORPHINE HYDROCHLORIDE, NALOXONE HYDROCHLORIDE 1 FILM: 8; 2 FILM, SOLUBLE BUCCAL; SUBLINGUAL at 17:55

## 2019-05-30 RX ADMIN — IBUPROFEN 400 MG: 400 TABLET ORAL at 10:15

## 2019-05-30 RX ADMIN — VENLAFAXINE HYDROCHLORIDE 262.5 MG: 75 CAPSULE, EXTENDED RELEASE ORAL at 10:17

## 2019-05-30 RX ADMIN — ENOXAPARIN SODIUM 40 MG: 40 INJECTION SUBCUTANEOUS at 10:16

## 2019-05-30 RX ADMIN — PREGABALIN 200 MG: 100 CAPSULE ORAL at 20:38

## 2019-05-30 RX ADMIN — KETOROLAC TROMETHAMINE 15 MG: 30 INJECTION, SOLUTION INTRAMUSCULAR at 07:40

## 2019-05-30 RX ADMIN — CARISOPRODOL 350 MG: 350 TABLET ORAL at 10:15

## 2019-05-30 RX ADMIN — NICOTINE 1 PATCH: 7 PATCH TRANSDERMAL at 20:38

## 2019-05-31 VITALS
DIASTOLIC BLOOD PRESSURE: 102 MMHG | SYSTOLIC BLOOD PRESSURE: 160 MMHG | OXYGEN SATURATION: 98 % | RESPIRATION RATE: 16 BRPM | HEART RATE: 98 BPM | TEMPERATURE: 97.8 F | WEIGHT: 139.99 LBS | HEIGHT: 64 IN | BODY MASS INDEX: 23.9 KG/M2

## 2019-05-31 PROBLEM — E87.6 HYPOKALEMIA: Status: RESOLVED | Noted: 2019-05-28 | Resolved: 2019-05-31

## 2019-05-31 LAB
ANION GAP SERPL CALCULATED.3IONS-SCNC: 8 MMOL/L (ref 4–13)
BACTERIA BLD CULT: NORMAL
BACTERIA BLD CULT: NORMAL
BUN SERPL-MCNC: 8 MG/DL (ref 5–25)
CALCIUM SERPL-MCNC: 8.3 MG/DL (ref 8.3–10.1)
CHLORIDE SERPL-SCNC: 108 MMOL/L (ref 100–108)
CO2 SERPL-SCNC: 29 MMOL/L (ref 21–32)
CREAT SERPL-MCNC: 0.57 MG/DL (ref 0.6–1.3)
GFR SERPL CREATININE-BSD FRML MDRD: 103 ML/MIN/1.73SQ M
GLUCOSE SERPL-MCNC: 78 MG/DL (ref 65–140)
MAGNESIUM SERPL-MCNC: 2.1 MG/DL (ref 1.6–2.6)
PHOSPHATE SERPL-MCNC: 4.7 MG/DL (ref 2.7–4.5)
POTASSIUM SERPL-SCNC: 4.1 MMOL/L (ref 3.5–5.3)
SODIUM SERPL-SCNC: 145 MMOL/L (ref 136–145)

## 2019-05-31 PROCEDURE — 80048 BASIC METABOLIC PNL TOTAL CA: CPT | Performed by: INTERNAL MEDICINE

## 2019-05-31 PROCEDURE — 83735 ASSAY OF MAGNESIUM: CPT | Performed by: INTERNAL MEDICINE

## 2019-05-31 PROCEDURE — 99239 HOSP IP/OBS DSCHRG MGMT >30: CPT | Performed by: INTERNAL MEDICINE

## 2019-05-31 PROCEDURE — 84100 ASSAY OF PHOSPHORUS: CPT | Performed by: INTERNAL MEDICINE

## 2019-05-31 PROCEDURE — 99232 SBSQ HOSP IP/OBS MODERATE 35: CPT | Performed by: NURSE PRACTITIONER

## 2019-05-31 RX ORDER — QUETIAPINE FUMARATE 200 MG/1
200 TABLET, FILM COATED ORAL
Qty: 30 TABLET | Refills: 0 | Status: SHIPPED | OUTPATIENT
Start: 2019-05-31

## 2019-05-31 RX ORDER — DOXYCYCLINE HYCLATE 100 MG/1
100 CAPSULE ORAL EVERY 12 HOURS SCHEDULED
Qty: 10 CAPSULE | Refills: 0 | Status: SHIPPED | OUTPATIENT
Start: 2019-05-31 | End: 2019-06-05

## 2019-05-31 RX ORDER — CALCIUM CARBONATE 200(500)MG
500 TABLET,CHEWABLE ORAL 3 TIMES DAILY PRN
Qty: 20 TABLET | Refills: 0 | Status: SHIPPED | OUTPATIENT
Start: 2019-05-31

## 2019-05-31 RX ORDER — PREGABALIN 200 MG/1
200 CAPSULE ORAL 3 TIMES DAILY
Qty: 90 CAPSULE | Refills: 0 | Status: SHIPPED | OUTPATIENT
Start: 2019-05-31 | End: 2019-06-10

## 2019-05-31 RX ORDER — CARISOPRODOL 250 MG/1
250 TABLET ORAL 3 TIMES DAILY
Qty: 90 TABLET | Refills: 0 | Status: SHIPPED | OUTPATIENT
Start: 2019-05-31 | End: 2019-06-10

## 2019-05-31 RX ORDER — IBUPROFEN 400 MG/1
400 TABLET ORAL EVERY 6 HOURS PRN
Qty: 20 TABLET | Refills: 0 | Status: SHIPPED | OUTPATIENT
Start: 2019-05-31

## 2019-05-31 RX ORDER — NORTRIPTYLINE HYDROCHLORIDE 10 MG/1
10 CAPSULE ORAL
Qty: 30 CAPSULE | Refills: 0 | Status: SHIPPED | OUTPATIENT
Start: 2019-05-31

## 2019-05-31 RX ADMIN — PREGABALIN 200 MG: 100 CAPSULE ORAL at 21:46

## 2019-05-31 RX ADMIN — CARISOPRODOL 350 MG: 350 TABLET ORAL at 15:06

## 2019-05-31 RX ADMIN — IBUPROFEN 400 MG: 400 TABLET ORAL at 06:03

## 2019-05-31 RX ADMIN — DIBASIC SODIUM PHOSPHATE, MONOBASIC POTASSIUM PHOSPHATE AND MONOBASIC SODIUM PHOSPHATE 1 TABLET: 852; 155; 130 TABLET ORAL at 12:46

## 2019-05-31 RX ADMIN — DIBASIC SODIUM PHOSPHATE, MONOBASIC POTASSIUM PHOSPHATE AND MONOBASIC SODIUM PHOSPHATE 1 TABLET: 852; 155; 130 TABLET ORAL at 15:57

## 2019-05-31 RX ADMIN — IBUPROFEN 400 MG: 400 TABLET ORAL at 16:35

## 2019-05-31 RX ADMIN — DIBASIC SODIUM PHOSPHATE, MONOBASIC POTASSIUM PHOSPHATE AND MONOBASIC SODIUM PHOSPHATE 1 TABLET: 852; 155; 130 TABLET ORAL at 07:54

## 2019-05-31 RX ADMIN — DOXYCYCLINE HYCLATE 100 MG: 100 CAPSULE ORAL at 21:46

## 2019-05-31 RX ADMIN — DOXYCYCLINE HYCLATE 100 MG: 100 CAPSULE ORAL at 08:14

## 2019-05-31 RX ADMIN — BUPRENORPHINE HYDROCHLORIDE, NALOXONE HYDROCHLORIDE 1 FILM: 8; 2 FILM, SOLUBLE BUCCAL; SUBLINGUAL at 08:14

## 2019-05-31 RX ADMIN — VENLAFAXINE HYDROCHLORIDE 262.5 MG: 75 CAPSULE, EXTENDED RELEASE ORAL at 08:14

## 2019-05-31 RX ADMIN — ENOXAPARIN SODIUM 40 MG: 40 INJECTION SUBCUTANEOUS at 08:15

## 2019-05-31 RX ADMIN — ACETAMINOPHEN 650 MG: 325 TABLET ORAL at 17:48

## 2019-05-31 RX ADMIN — BUPRENORPHINE HYDROCHLORIDE, NALOXONE HYDROCHLORIDE 1 FILM: 8; 2 FILM, SOLUBLE BUCCAL; SUBLINGUAL at 17:48

## 2019-05-31 RX ADMIN — CARISOPRODOL 350 MG: 350 TABLET ORAL at 08:14

## 2019-05-31 RX ADMIN — PREGABALIN 200 MG: 100 CAPSULE ORAL at 15:06

## 2019-05-31 RX ADMIN — CARISOPRODOL 350 MG: 350 TABLET ORAL at 21:46

## 2019-05-31 RX ADMIN — NORTRIPTYLINE HYDROCHLORIDE 10 MG: 10 CAPSULE ORAL at 00:15

## 2019-05-31 RX ADMIN — PREGABALIN 200 MG: 100 CAPSULE ORAL at 08:14

## 2019-06-03 ENCOUNTER — EPISODE CHANGES (OUTPATIENT)
Dept: CASE MANAGEMENT | Facility: HOSPITAL | Age: 59
End: 2019-06-03

## 2019-06-05 ENCOUNTER — PATIENT OUTREACH (OUTPATIENT)
Dept: CASE MANAGEMENT | Facility: OTHER | Age: 59
End: 2019-06-05

## 2019-06-06 ENCOUNTER — PATIENT OUTREACH (OUTPATIENT)
Dept: CASE MANAGEMENT | Facility: OTHER | Age: 59
End: 2019-06-06

## 2019-06-11 ENCOUNTER — PATIENT OUTREACH (OUTPATIENT)
Dept: CASE MANAGEMENT | Facility: OTHER | Age: 59
End: 2019-06-11

## 2019-06-13 ENCOUNTER — PATIENT OUTREACH (OUTPATIENT)
Dept: CASE MANAGEMENT | Facility: OTHER | Age: 59
End: 2019-06-13

## 2019-06-14 ENCOUNTER — PATIENT OUTREACH (OUTPATIENT)
Dept: CASE MANAGEMENT | Facility: OTHER | Age: 59
End: 2019-06-14

## 2019-06-17 ENCOUNTER — PATIENT OUTREACH (OUTPATIENT)
Dept: CASE MANAGEMENT | Facility: OTHER | Age: 59
End: 2019-06-17

## 2019-06-17 ENCOUNTER — EPISODE CHANGES (OUTPATIENT)
Dept: CASE MANAGEMENT | Facility: OTHER | Age: 59
End: 2019-06-17

## 2019-06-26 ENCOUNTER — PATIENT OUTREACH (OUTPATIENT)
Dept: CASE MANAGEMENT | Facility: OTHER | Age: 59
End: 2019-06-26

## 2019-07-25 ENCOUNTER — PATIENT OUTREACH (OUTPATIENT)
Dept: CASE MANAGEMENT | Facility: OTHER | Age: 59
End: 2019-07-25

## 2019-07-25 NOTE — PROGRESS NOTES
Email sent to Cait White, at [facility], for update on patient status

## 2019-07-30 ENCOUNTER — PATIENT OUTREACH (OUTPATIENT)
Dept: CASE MANAGEMENT | Facility: OTHER | Age: 59
End: 2019-07-30

## 2019-07-30 NOTE — PROGRESS NOTES
Attempted to contact patient for update on status and whereabouts, however, phone has restrictions on it and this care manager is unable to make a call or leave a voicemail

## 2019-07-30 NOTE — PROGRESS NOTES
Attempted to reach out to [facility] ([phone])  Voicemail left for  regarding update on patient

## 2019-07-30 NOTE — PROGRESS NOTES
Received call from Liz Henriquez, [place] [facility], who states patient was in their facility for less than 24 hours and then was discharged to a hospital due to "drug issues "    Chart review completed and it is noted that patient was admitted to [facility] from 6/14/19-6/27/19 due to an abscess on her arm from heroin use, prior to [place] [facility]  Patient was discharged to [facility] for ETOH and drug

## 2019-08-06 NOTE — PROGRESS NOTES
Voicemail left with clinical supervisor, at [place] [id number], asking for update on patient's status and whether patient was discharged from facility  Name and number provided

## 2019-08-29 ENCOUNTER — PATIENT OUTREACH (OUTPATIENT)
Dept: CASE MANAGEMENT | Facility: OTHER | Age: 59
End: 2019-08-29

## 2021-11-26 ENCOUNTER — NURSE TRIAGE (OUTPATIENT)
Dept: OTHER | Facility: OTHER | Age: 61
End: 2021-11-26

## 2021-11-30 ENCOUNTER — TELEPHONE (OUTPATIENT)
Dept: OBGYN CLINIC | Facility: HOSPITAL | Age: 61
End: 2021-11-30

## 2022-02-22 ENCOUNTER — TELEPHONE (OUTPATIENT)
Dept: OTHER | Facility: OTHER | Age: 62
End: 2022-02-22

## 2022-05-11 ENCOUNTER — TELEPHONE (OUTPATIENT)
Dept: PSYCHIATRY | Facility: CLINIC | Age: 62
End: 2022-05-11

## 2022-05-11 NOTE — TELEPHONE ENCOUNTER
Pt was added to the wait list for talk therapy is open to vv  Prefers in person no referral in system

## 2022-05-19 ENCOUNTER — TELEPHONE (OUTPATIENT)
Dept: PSYCHIATRY | Facility: CLINIC | Age: 62
End: 2022-05-19

## 2022-06-01 ENCOUNTER — TELEPHONE (OUTPATIENT)
Dept: OTHER | Facility: OTHER | Age: 62
End: 2022-06-01

## 2022-06-01 NOTE — TELEPHONE ENCOUNTER
Pt states that all of her medication is due for refill and she is out  Please send refill to pharmacy

## 2022-06-26 ENCOUNTER — NURSE TRIAGE (OUTPATIENT)
Dept: OTHER | Facility: OTHER | Age: 62
End: 2022-06-26

## 2022-06-26 NOTE — TELEPHONE ENCOUNTER
Regarding: nausea, low energy, dizziness x 4 to 5 days  ----- Message from Olayinka Scott sent at 6/26/2022  1:53 PM EDT -----  "I am nauseated, have no energy, dizzy for the last 4 or 5 days  "

## 2022-06-26 NOTE — TELEPHONE ENCOUNTER
Reason for Disposition   Caller has cancelled the call before the first contact    Protocols used: NO CONTACT OR DUPLICATE CONTACT CALL-ADULT-AH      Patient declined triage  Would like RN to send message to office for Dr Justus Moore to f/u on Monday  Complaints of vomiting and nausea  Pt ended call

## 2022-07-30 ENCOUNTER — TELEPHONE (OUTPATIENT)
Dept: OTHER | Facility: OTHER | Age: 62
End: 2022-07-30

## 2022-08-01 NOTE — TELEPHONE ENCOUNTER
Tried to call pt at [phone] but no answer and vm is full    No details in previous note regarding body part or location    If patient calls in, please schedule with appropriate provider and location

## 2022-11-01 ENCOUNTER — HOSPITAL ENCOUNTER (OUTPATIENT)
Dept: RADIOLOGY | Facility: HOSPITAL | Age: 62
Discharge: HOME | End: 2022-11-01
Attending: ORTHOPAEDIC SURGERY

## 2022-11-01 ENCOUNTER — OFFICE VISIT (OUTPATIENT)
Dept: OBGYN CLINIC | Facility: CLINIC | Age: 62
End: 2022-11-01

## 2022-11-01 VITALS
DIASTOLIC BLOOD PRESSURE: 77 MMHG | BODY MASS INDEX: 25.92 KG/M2 | HEART RATE: 84 BPM | WEIGHT: 151 LBS | SYSTOLIC BLOOD PRESSURE: 110 MMHG

## 2022-11-01 DIAGNOSIS — M25.551 PAIN IN RIGHT HIP: ICD-10-CM

## 2022-11-01 DIAGNOSIS — T84.51XA INFECTION ASSOCIATED WITH INTERNAL RIGHT HIP PROSTHESIS, INITIAL ENCOUNTER (HCC): Primary | ICD-10-CM

## 2022-11-01 RX ORDER — HYDROXYZINE 50 MG/1
50 TABLET, FILM COATED ORAL 4 TIMES DAILY PRN
COMMUNITY
Start: 2022-10-12

## 2022-11-01 RX ORDER — CLONIDINE HYDROCHLORIDE 0.1 MG/1
0.1 TABLET ORAL 3 TIMES DAILY
COMMUNITY
Start: 2022-10-31

## 2022-11-01 RX ORDER — ONDANSETRON 4 MG/1
4 TABLET, ORALLY DISINTEGRATING ORAL EVERY 8 HOURS PRN
COMMUNITY
Start: 2022-10-23 | End: 2022-11-02

## 2022-11-01 RX ORDER — ZIPRASIDONE HYDROCHLORIDE 20 MG/1
20 CAPSULE ORAL 2 TIMES DAILY
COMMUNITY
Start: 2022-10-10

## 2022-11-01 RX ORDER — BUPRENORPHINE AND NALOXONE 8; 2 MG/1; MG/1
FILM, SOLUBLE BUCCAL; SUBLINGUAL
COMMUNITY
Start: 2022-10-28

## 2022-11-01 RX ORDER — ALBUTEROL SULFATE 90 UG/1
AEROSOL, METERED RESPIRATORY (INHALATION)
COMMUNITY
Start: 2022-10-17

## 2022-11-01 NOTE — PROGRESS NOTES
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  For an adduction brace to help with the instability  To do next visit:  Return for will call with the [facility]  The above stated was discussed in layman's terms and the patient expressed understanding  All questions were answered to the patient's satisfaction  Scribe Attestation    I,:  Nubia Monge am acting as a scribe while in the presence of the attending physician :       I,:  Barbara Wall MD personally performed the services described in this documentation    as scribed in my presence :             Subjective:   Olivia Dickson is a 65 y o  female who presents today for a 2nd opinion for her right hip joint  She states that she had right total hip that had a dislocation soon after  Then joint became septic and was highly medicated  The infection went into the femur  Her last surgery s/p Right hip I&D, explant to spacer, wound vac application on 53/96/49 by Dr Pulliam Holding  She presents with a walker for ambulation and a shoe lift due to right leg length  She states that the right hip is getting 'stuck' and painful  It's unstable and the leg externally rotates with dragging     She has asked her previous surgeon for an adduction brace and he felt they were cumbersome  Review of systems negative unless otherwise specified in HPI  Review of Systems   Constitutional: Negative for chills, fever and unexpected weight change  HENT: Negative for hearing loss, nosebleeds and sore throat  Eyes: Negative for pain, redness and visual disturbance  Respiratory: Negative for cough, shortness of breath and wheezing  Cardiovascular: Negative for chest pain, palpitations and leg swelling  Gastrointestinal: Negative for abdominal pain and nausea  Genitourinary: Negative for dyspareunia, dysuria and frequency  Musculoskeletal: Positive for gait problem  Skin: Negative for rash and wound  Neurological: Negative for dizziness, numbness and headaches  Psychiatric/Behavioral: Negative for decreased concentration and suicidal ideas  The patient is not nervous/anxious  Past Medical History:   Diagnosis Date   • Back pain    • Drug use    • [facility]    • Neck pain        Past Surgical History:   Procedure Laterality Date   • BACK SURGERY      fusion   • HIP SURGERY Bilateral    • NECK SURGERY      fusion       History reviewed  No pertinent family history      Social History     Occupational History   • Not on file   Tobacco Use   • Smoking status: Former Smoker     Packs/day: 0 25     Types: Cigarettes     Quit date: 2022     Years since quittin 2   • Smokeless tobacco: Never Used   Vaping Use   • Vaping Use: Never used   Substance and Sexual Activity   • Alcohol use: Not Currently   • Drug use: Not Currently     Types: Heroin, Marijuana     Comment: heroin: 19  marijuana: 19   • Sexual activity: Not on file         Current Outpatient Medications:   •  albuterol (PROVENTIL HFA,VENTOLIN HFA) 90 mcg/act inhaler, INHALE TWO PUFFS BY MOUTH EVERY 6 HOURS AS NEEDED FOR wheezing OR FOR SHORTNESS OF BREATH, Disp: , Rfl:   •  buprenorphine-naloxone (Suboxone) 8-2 mg, PLACE THREE FILMS UNDER THE TONGUE ONCE EVERY DAY, Disp: , Rfl:   •  calcium carbonate (TUMS) 500 mg chewable tablet, Chew 1 tablet (500 mg total) 3 (three) times a day as needed for indigestion or heartburn, Disp: 20 tablet, Rfl: 0  •  cloNIDine (CATAPRES) 0 1 mg tablet, Take 0 1 mg by mouth 3 (three) times a day, Disp: , Rfl:   •  hydrOXYzine HCL (ATARAX) 50 mg tablet, Take 50 mg by mouth 4 (four) times a day as needed, Disp: , Rfl:   •  ibuprofen (MOTRIN) 400 mg tablet, Take 1 tablet (400 mg total) by mouth every 6 (six) hours as needed for moderate pain - take with meals, Disp: 20 tablet, Rfl: 0  •  nortriptyline (PAMELOR) 10 mg capsule, Take 1 capsule (10 mg total) by mouth daily at bedtime, Disp: 30 capsule, Rfl: 0  •  ondansetron (ZOFRAN-ODT) 4 mg disintegrating tablet, Take 4 mg by mouth every 8 (eight) hours as needed, Disp: , Rfl:   •  venlafaxine (EFFEXOR-XR) 150 mg 24 hr capsule, Take 262 5 mg by mouth daily 325mg, Disp: , Rfl:   •  ziprasidone (GEODON) 20 mg capsule, Take 20 mg by mouth 2 (two) times a day, Disp: , Rfl:   •  carisoprodol (SOMA) 250 MG, Take 1 tablet (250 mg total) by mouth 3 (three) times a day for 10 days, Disp: 90 tablet, Rfl: 0  •  pregabalin (LYRICA) 200 MG capsule, Take 1 capsule (200 mg total) by mouth 3 (three) times a day for 10 days, Disp: 90 capsule, Rfl: 0  •  QUEtiapine (SEROquel) 200 mg tablet, Take 1 tablet (200 mg total) by mouth daily at bedtime (Patient not taking: No sig reported), Disp: 30 tablet, Rfl: 0    Allergies   Allergen Reactions   • Other      PT "There is some other antibiotic that I took,its a really long name   I dont remember the name"    • Sulfa Antibiotics      Unknown reaction     • Cefazolin Hives and Rash            Vitals:    11/01/22 0848   BP: 110/77   Pulse: 84       Objective:    General:  No apparent distress   CV:  S1-S2   Abdomen:  Soft   Chest:  No audible wheezing   Patient is ambulating with assistance with a shoe lift, and denies any subjective pain                    Right Hip Exam     Tenderness   The patient is experiencing tenderness in the lateral     Range of Motion   Flexion: 60   External rotation: 30   Internal rotation: 20     Muscle Strength   Abduction: 3/5   Adduction: 3/5   Flexion: 3/5     Other   Erythema: absent  Sensation: normal  Pulse: present    Comments:  Calf is soft and non tender        (patient has subjectively stating that she feels that her hip gets core in position and she has to move it around in order to unhinge her thigh)    Diagnostics, reviewed and taken today if performed as documented: The attending physician has personally reviewed the pertinent films in PACS and interpretation is as follows:  X-rays right hip/pelvis:  Girdle stone procedure noted on radiographic imaging; osteopenia noted on the femoral shaft    Procedures, if performed today:    Procedures    None performed      Portions of the record may have been created with voice recognition software  Occasional wrong word or "sound a like" substitutions may have occurred due to the inherent limitations of voice recognition software  Read the chart carefully and recognize, using context, where substitutions have occurred

## 2022-11-04 DIAGNOSIS — M25.551 PAIN IN RIGHT HIP: Primary | ICD-10-CM

## 2022-11-08 ENCOUNTER — CONSULT (OUTPATIENT)
Dept: OBGYN CLINIC | Facility: MEDICAL CENTER | Age: 62
End: 2022-11-08

## 2022-11-08 VITALS
HEIGHT: 64 IN | HEART RATE: 76 BPM | DIASTOLIC BLOOD PRESSURE: 93 MMHG | SYSTOLIC BLOOD PRESSURE: 148 MMHG | WEIGHT: 150 LBS | BODY MASS INDEX: 25.61 KG/M2

## 2022-11-08 DIAGNOSIS — Z98.890 STATUS POST GIRDLESTONE PROCEDURE: ICD-10-CM

## 2022-11-08 DIAGNOSIS — M25.551 PAIN IN RIGHT HIP: Primary | ICD-10-CM

## 2022-11-08 NOTE — PROGRESS NOTES
Hip New Office Note    Assessment:     1  Pain in right hip    2  Status post Girdlestone procedure        Plan:     Problem List Items Addressed This Visit    None     Visit Diagnoses     Pain in right hip    -  Primary    Relevant Orders    Ambulatory Referral to Orthopedic Surgery    Status post Girdlestone procedure        Relevant Orders    Ambulatory Referral to Orthopedic Surgery          Patient is a 66 y/o female with right hip pain  She has a long history of multiple surgeries of the right hip due to recurrent infections  She most recently had a Right hip Girdlestone procedure with Dr Brandon Sweet in 2020  She complains of continued pain in the right hip and struggling with ambulation  Recent xrays reviewed today  On exam there is part of her skin graft that has scarred down to the proximal femoral bone, which is causing her discomfort  We reviewed that due to her multiple recurrent infections, she is at high risk for infection again if revision surgery was considered  She is struggling with her ADLs and pain and is interested in pursuing surgical options if there are any  We will refer to Dr Jose Wilks for another opinion and discuss the case with him to see what he may suggest  Patient is at high risk for wound complications and recurrent PJI  The patient also has severe bone loss of the proximal femur which would necessitate PFR for reconstruction if considered and is abductor deficient which would necessitate likely acute locked liner  Subjective:     Patient ID: Jeanie Wong is a 65 y o  female  Chief Complaint:  HPI:  Patient is a 66 y/o female who presents today for an evaluation of her RIGHT hip  She has a history of previous bilateral hip replacements, Right hip was performed in 1555 by Dr Danny Morse, which became infected with MSSA in 2014  She then developed a forearm abscess due to history of IV drug use, which resulted in bilateral hip pain   She was found to have an infection in her RIGHT KJ as well as a septic Left She had a Right hip Girdlestone procedure by Dr Yancy Luna with a subsequent conversion to a Right KJ on 2020  The hip then became infected again and she had Right hip I&D, explant to spacer, wound vac application on  by Dr Yancy Luna  She also had wound closure with Plastic Surgery  Since her most recent surgery she continues to experience pain in the right hip and difficulty with ambulating  She has pain over the lateral hip today and she ambulates with a rolling walker  Allergy:  Allergies   Allergen Reactions   • Other      PT "There is some other antibiotic that I took,its a really long name  I dont remember the name"    • Sulfa Antibiotics      Unknown reaction     • Cefazolin Hives and Rash     Medications:  all current active meds have been reviewed  Past Medical History:  Past Medical History:   Diagnosis Date   • Back pain    • Drug use    • Heroin abuse ([facility] [address] )    • Neck pain      Past Surgical History:  Past Surgical History:   Procedure Laterality Date   • BACK SURGERY      fusion   • HIP SURGERY Bilateral    • NECK SURGERY      fusion     Family History:  History reviewed  No pertinent family history    Social History:  Social History     Substance and Sexual Activity   Alcohol Use Not Currently     Social History     Substance and Sexual Activity   Drug Use Not Currently   • Types: Heroin, Marijuana    Comment: heroin: 19  marijuana: 19     Social History     Tobacco Use   Smoking Status Former Smoker   • Packs/day: 0 25   • Types: Cigarettes   • Quit date: 2022   • Years since quittin 2   Smokeless Tobacco Never Used           ROS:  General: Per HPI  Skin: Negative, except if noted below  HEENT: Negative  Respiratory: Negative  Cardiovascular: Negative  Gastrointestinal: Negative  Urinary: Negative  Vascular: Negative  Musculoskeletal: Positive per HPI   Neurologic: Positive per HPI  Endocrine: Negative    Objective:  BP Readings from Last 1 Encounters:   11/08/22 148/93      Wt Readings from Last 1 Encounters:   11/08/22 68 kg (150 lb)        Respiratory:   non-labored respirations    Lymphatics:  no palpable lymph nodes    Gait and Station:   Altered, with use of walker    Neurologic:   Alert and oriented times 3  Patient with normal sensation except as noted below  Deep tendon reflexes 2+ except as noted in MSK exam    Bilateral Lower Extremity:      Right Hip     Inspection: previous skin grafting noted with scarring of skin to proximal femoral bone, multiple incisions     Range of Motion: flail extremity with pain    Motor: 5/5 IP/Q/HS/TA/GS    Pulses: 2+ DP / 2+ PT    SILT DP/SP/S/S/TN    Imaging:  My interpretation XR AP pelvis/ right hip: s/p Girdlestone procedure with severe proximal bone loss      BMI:   Estimated body mass index is 25 75 kg/m² as calculated from the following:    Height as of this encounter: 5' 4" (1 626 m)  Weight as of this encounter: 68 kg (150 lb)  BSA:   Estimated body surface area is 1 73 meters squared as calculated from the following:    Height as of this encounter: 5' 4" (1 626 m)  Weight as of this encounter: 68 kg (150 lb)             Scribe Attestation    I,:  Marly Cristobal PA-C am acting as a scribe while in the presence of the attending physician :       I,:  Chuck Palma, DO personally performed the services described in this documentation    as scribed in my presence :

## 2022-12-01 ENCOUNTER — OFFICE VISIT (OUTPATIENT)
Dept: OBGYN CLINIC | Facility: MEDICAL CENTER | Age: 62
End: 2022-12-01

## 2022-12-01 VITALS
SYSTOLIC BLOOD PRESSURE: 159 MMHG | HEART RATE: 85 BPM | HEIGHT: 64 IN | WEIGHT: 150 LBS | DIASTOLIC BLOOD PRESSURE: 101 MMHG | BODY MASS INDEX: 25.61 KG/M2

## 2022-12-01 DIAGNOSIS — Z98.890 STATUS POST GIRDLESTONE PROCEDURE: ICD-10-CM

## 2022-12-01 DIAGNOSIS — M21.70 ACQUIRED LEG LENGTH DISCREPANCY: ICD-10-CM

## 2022-12-01 DIAGNOSIS — A41.01 MSSA (METHICILLIN SUSCEPTIBLE STAPHYLOCOCCUS AUREUS) SEPTICEMIA (HCC): Primary | ICD-10-CM

## 2022-12-01 DIAGNOSIS — M25.551 PAIN IN RIGHT HIP: ICD-10-CM

## 2022-12-01 NOTE — PROGRESS NOTES
Ortho Sports Medicine New Patient Visit     Assesment:   65 y o  female with right hip pain status post Girdlestone procedure and 4 hip surgeries     Plan:    The patients x-rays were reviewed today  The patient was informed she could benefit from a larger shoe lift due to her leg being able to swing when standing on the opposite leg  I referred her to a podiatrist to have her shoe lift addressed  She was shown videos of cancer patients that had similar procedures performed as her that are thriving  This gave the patient hope and she is no longer seeking someone to perform another surgery on her  I have discussed it to prescribe physical therapy in an effort to decrease pain, improve strength, and improve flexibility  Long discussion had that since patient had MRSA previously, and has had flap work on the R and her skin graft is healed down to her bone, she does not have a quality softi tissue envrionment to handle a large hip arthroplasty/PFR not to mention diffucult in eradicating her MRSA  Follow up:    Return if symptoms worsen or fail to improve  Chief Complaint   Patient presents with   • Right Hip - Pain       History of Present Illness: The patient is a 65 y o  female who presents for evaluation of right hip status post Girdlestone procedure  She reports she has difficulty walking  She complains of the appearance of the skin at the right hip status post 4 hip surgeries  She reports she is frustrated with her ADLs as she cannot do what she needs to  She surface walks and must hold onto objects to get around if she does not have her walker with her             Surgical History:  9/28/20 R total hip arthroplasty  10/9/20 closed reduction right hip  10/22/20 R total hip arthroplasty revision  11/5/20 R hip I&D, explant to spacer, wound vac application  27/54/45 Debridement R hip wound, ostectomy, R thigh musculocutaneous flap, split thickness skin graft to right thigh  3/9/21 Debridement of R thigh wound, skin graft     Past Medical, Social and Family History:  Past Medical History:   Diagnosis Date   • Back pain    • Drug use    • Heroin abuse ([facility] [address] )    • Neck pain      Past Surgical History:   Procedure Laterality Date   • BACK SURGERY      fusion   • HIP SURGERY Bilateral    • NECK SURGERY      fusion     Allergies   Allergen Reactions   • Other      PT "There is some other antibiotic that I took,its a really long name   I dont remember the name"    • Sulfa Antibiotics      Unknown reaction     • Cefazolin Hives and Rash     Current Outpatient Medications on File Prior to Visit   Medication Sig Dispense Refill   • albuterol (PROVENTIL HFA,VENTOLIN HFA) 90 mcg/act inhaler INHALE TWO PUFFS BY MOUTH EVERY 6 HOURS AS NEEDED FOR wheezing OR FOR SHORTNESS OF BREATH     • buprenorphine-naloxone (Suboxone) 8-2 mg PLACE THREE FILMS UNDER THE TONGUE ONCE EVERY DAY     • calcium carbonate (TUMS) 500 mg chewable tablet Chew 1 tablet (500 mg total) 3 (three) times a day as needed for indigestion or heartburn 20 tablet 0   • cloNIDine (CATAPRES) 0 1 mg tablet Take 0 1 mg by mouth 3 (three) times a day     • hydrOXYzine HCL (ATARAX) 50 mg tablet Take 50 mg by mouth 4 (four) times a day as needed     • ibuprofen (MOTRIN) 400 mg tablet Take 1 tablet (400 mg total) by mouth every 6 (six) hours as needed for moderate pain - take with meals 20 tablet 0   • nortriptyline (PAMELOR) 10 mg capsule Take 1 capsule (10 mg total) by mouth daily at bedtime 30 capsule 0   • QUEtiapine (SEROquel) 200 mg tablet Take 1 tablet (200 mg total) by mouth daily at bedtime 30 tablet 0   • venlafaxine (EFFEXOR-XR) 150 mg 24 hr capsule Take 262 5 mg by mouth daily 325mg     • ziprasidone (GEODON) 20 mg capsule Take 20 mg by mouth 2 (two) times a day     • carisoprodol (SOMA) 250 MG Take 1 tablet (250 mg total) by mouth 3 (three) times a day for 10 days 90 tablet 0   • ondansetron (ZOFRAN-ODT) 4 mg disintegrating tablet Take 4 mg by mouth every 8 (eight) hours as needed     • pregabalin (LYRICA) 200 MG capsule Take 1 capsule (200 mg total) by mouth 3 (three) times a day for 10 days 90 capsule 0     No current facility-administered medications on file prior to visit  Social History     Socioeconomic History   • Marital status:      Spouse name: Not on file   • Number of children: Not on file   • Years of education: Not on file   • Highest education level: Not on file   Occupational History   • Not on file   Tobacco Use   • Smoking status: Former     Packs/day: 0 25     Types: Cigarettes     Quit date: 2022     Years since quittin 3   • Smokeless tobacco: Never   Vaping Use   • Vaping Use: Never used   Substance and Sexual Activity   • Alcohol use: Not Currently   • Drug use: Not Currently     Types: Heroin, Marijuana     Comment: heroin: 19  marijuana: 19   • Sexual activity: Not on file   Other Topics Concern   • Not on file   Social History Narrative   • Not on file     Social Determinants of Health     Financial Resource Strain: Not on file   Food Insecurity: Not on file   Transportation Needs: Not on file   Physical Activity: Not on file   Stress: Not on file   Social Connections: Not on file   Intimate Partner Violence: Not on file   Housing Stability: Not on file         I have reviewed the past medical, surgical, social and family history, medications and allergies as documented in the EMR  Review of systems: ROS is negative other than that noted in the HPI  Constitutional: Negative for fatigue and fever  HENT: Negative for sore throat  Respiratory: Negative for shortness of breath  Cardiovascular: Negative for chest pain  Gastrointestinal: Negative for abdominal pain  Endocrine: Negative for cold intolerance and heat intolerance  Genitourinary: Negative for flank pain  Musculoskeletal: Negative for back pain  Skin: Negative for rash  Allergic/Immunologic: Negative for immunocompromised state  Neurological: Negative for dizziness  Psychiatric/Behavioral: Negative for agitation  Physical Exam:    Blood pressure (!) 159/101, pulse 85, height 5' 4" (1 626 m), weight 68 kg (150 lb)  General/Constitutional: NAD, well developed, well nourished  HENT: Normocephalic, atraumatic  CV: Intact distal pulses, regular rate  Resp: No respiratory distress or labored breathing  Lymphatic: No lymphadenopathy palpated  Neuro: Alert and Oriented x 3, no focal deficits  Psych: Normal mood, normal affect  Skin: Warm, dry, no rashes, no erythema      Right Hip:  Scars present  Skin scarred down to the bone  Patient sits comfortably with hips flexed to 90 degrees   She ambulates with a rolling walker       Imaging    X-rays of the right hip taken 11/1/22 reviewed and interpreted today  These show evidence of a Girdlestone procedure as she has had her right femoral head surgically removed       Scribe Attestation    I,:  Abran Richardson am acting as a scribe while in the presence of the attending physician :       I,:  Tasneem Pisano DO personally performed the services described in this documentation    as scribed in my presence :

## 2023-09-27 ENCOUNTER — TELEPHONE (OUTPATIENT)
Dept: PSYCHIATRY | Facility: CLINIC | Age: 63
End: 2023-09-27

## 2023-09-27 NOTE — LETTER
Dear Jayme Beyer : We are contacting you because your name is currently included on the [address] [place] [address] wait-list for Talk Therapy and/or Medication Management. (Please [place] which services are needed)     In our efforts to provide the highest quality care, [facility] has begun the process of upgrading our behavioral health systems to increase efficiency and expedite delivery of services. As part of this process, we ask you to please confirm your continued interest in the services above. If you are no longer interested or in need, please jade “No” in the area below. If you are still interested and in need, please jade “Yes” and provide your most current demographic and insurance information within 15 days. If we do not receive confirmation from you by 2023 your information will not be included in the system upgrade and your place on the waitlist will be lost.     Thank you in advance for your patience and understanding and we apologize for any inconvenience this may cause. Patient Name and :    Still in need of services: Yes or No     Current Address:     Phone#:     Best time to receive a call: Insurance Carrier:      Policy/ID#: Group#: Insurance Services Phone#:      What is your current presenting problem? Open to virtual talk therapy: Yes or No      We will call you to do an Intake when an appointment becomes available. You can send this information back to us in any of the ways below:    Email: [email or web address]  Fax#:  [phone]  Mail:   [address] [place] [address]             [address] [facility], [address] [place] [address]

## 2023-10-09 NOTE — TELEPHONE ENCOUNTER
Patient has been added to the Talk Therapy wait list without a referral.    Insurance: [address] [place] [address] Type:    Commercial []   Medicaid [x]   [place] (if applicable)   Medicare []  Location Preference: [place] or [place]  Provider Preference: none  Virtual: Yes [x] No []  Were outside resources sent: Yes [x] No []  Advised the patient to contact her PCP for a referral.     Placed on the Epic wait list.

## 2024-02-27 ENCOUNTER — OFFICE VISIT (OUTPATIENT)
Dept: OBGYN CLINIC | Facility: MEDICAL CENTER | Age: 64
End: 2024-02-27
Payer: MEDICARE

## 2024-02-27 VITALS
DIASTOLIC BLOOD PRESSURE: 89 MMHG | BODY MASS INDEX: 25.61 KG/M2 | SYSTOLIC BLOOD PRESSURE: 148 MMHG | HEART RATE: 87 BPM | HEIGHT: 64 IN | WEIGHT: 150 LBS

## 2024-02-27 DIAGNOSIS — Z98.890 S/P GIRDLESTONE PROCEDURE: Primary | ICD-10-CM

## 2024-02-27 DIAGNOSIS — M25.551 RIGHT HIP PAIN: ICD-10-CM

## 2024-02-27 DIAGNOSIS — Z47.89 AFTERCARE FOLLOWING SURGERY OF THE MUSCULOSKELETAL SYSTEM: ICD-10-CM

## 2024-02-27 PROCEDURE — 99213 OFFICE O/P EST LOW 20 MIN: CPT | Performed by: STUDENT IN AN ORGANIZED HEALTH CARE EDUCATION/TRAINING PROGRAM

## 2024-02-27 NOTE — PROGRESS NOTES
"Hip New Office Note    Assessment:   No diagnosis found.    Plan:     Problem List Items Addressed This Visit    None  Visit Diagnoses       Right hip pain        Aftercare following surgery of the musculoskeletal system               Findings today are consistent with right hip pain with long history of multiple surgeries of the right hip due to recurrent infections and Girdlestone procedure performed by Dr. Beavers in 2020. Imaging and prognosis was reviewed with the patient today. Discussed continued use of shoe lift. Hip abduction brace ordered to hopefully help provide stability. Would not recommend any surgical intervention. Follow up as needed.    Subjective:     Patient ID: Alyssa Madrigal is a 63 y.o. female.  Chief Complaint:  HPI:  63 y.o. female presents to the office for follow up of right hip pain. She has a long history of multiple surgeries of the right hip due to recurrent infections. She most recently had a Right hip Girdlestone procedure with Dr. Beavers in 2020. She was seen by Dr. Lito Low who recommended shoe lifts. She has difficulty standing and with ambulation. She describes that her hip \"flops around.\" She notes that she has pain that keeps her awake throughout the night.    Allergy:  Allergies   Allergen Reactions    Other      PT \"There is some other antibiotic that I took,its a really long name. I dont remember the name\"     Pollen Extract Other (See Comments)     Sinus headache.    Sulfa Antibiotics      Unknown reaction      Cefazolin Hives and Rash     Medications:  all current active meds have been reviewed  Past Medical History:  Past Medical History:   Diagnosis Date    Back pain     Drug use     Heroin abuse (HCC)     Neck pain      Past Surgical History:  Past Surgical History:   Procedure Laterality Date    BACK SURGERY      fusion    HIP SURGERY Bilateral     NECK SURGERY      fusion     Family History:  No family history on file.  Social History:  Social History     Substance " "and Sexual Activity   Alcohol Use Not Currently     Social History     Substance and Sexual Activity   Drug Use Not Currently    Types: Heroin, Marijuana    Comment: heroin: 19  marijuana: 19     Social History     Tobacco Use   Smoking Status Former    Current packs/day: 0.00    Types: Cigarettes    Quit date: 2022    Years since quittin.5   Smokeless Tobacco Never           ROS:  General: Per HPI  Skin: Negative, except if noted below  HEENT: Negative  Respiratory: Negative  Cardiovascular: Negative  Gastrointestinal: Negative  Urinary: Negative  Vascular: Negative  Musculoskeletal: Positive per HPI   Neurologic: Positive per HPI  Endocrine: Negative    Objective:  BP Readings from Last 1 Encounters:   24 148/89      Wt Readings from Last 1 Encounters:   24 68 kg (150 lb)        Respiratory:   non-labored respirations    Lymphatics:  no palpable lymph nodes    Gait and Station:   Altered, with use of walker     Neurologic:   Alert and oriented times 3  Patient with normal sensation except as noted below  Deep tendon reflexes 2+ except as noted in MSK exam     Bilateral Lower Extremity:        Right Hip     Inspection: previous skin grafting noted with scarring of skin to proximal femoral bone, multiple incisions     Range of Motion: flail extremity with pain    Motor: 5/5 IP/Q/HS/TA/GS    Pulses: 2+ DP / 2+ PT    SILT DP/SP/S/S/TN    Imaging:  No new imaging obtained today     BMI:   Estimated body mass index is 25.75 kg/m² as calculated from the following:    Height as of this encounter: 5' 4\" (1.626 m).    Weight as of this encounter: 68 kg (150 lb).  BSA:   Estimated body surface area is 1.73 meters squared as calculated from the following:    Height as of this encounter: 5' 4\" (1.626 m).    Weight as of this encounter: 68 kg (150 lb).           Scribe Attestation      I,:  Sindi Odonnell am acting as a scribe while in the presence of the attending physician.:       I,:  Marshall" Chip Pagan DO personally performed the services described in this documentation    as scribed in my presence.:

## 2024-04-05 ENCOUNTER — TELEPHONE (OUTPATIENT)
Age: 64
End: 2024-04-05

## 2024-04-05 NOTE — TELEPHONE ENCOUNTER
Caller: Patient     Doctor/Office: Ej       What needs to be faxed: Please fax order for DURABLE MEDICAL EQUIPMENT, Patient is there right now.    Fax#: [phone]

## 2024-04-11 ENCOUNTER — TELEPHONE (OUTPATIENT)
Age: 64
End: 2024-04-11

## 2024-04-11 NOTE — TELEPHONE ENCOUNTER
Caller: [facility]    Doctor/Office: Ej NEWSOME#: [phone]      What needs to be faxed: PORTIA 2/27    ATTN to: N/A    Fax#: [phone]      Documents were successfully e-faxed

## 2024-09-24 ENCOUNTER — TELEPHONE (OUTPATIENT)
Age: 64
End: 2024-09-24

## 2024-10-08 ENCOUNTER — HOSPITAL ENCOUNTER (INPATIENT)
Facility: HOSPITAL | Age: 64
LOS: 20 days | Discharge: HOME | DRG: 540 | End: 2024-10-28
Attending: EMERGENCY MEDICINE
Payer: COMMERCIAL

## 2024-10-08 DIAGNOSIS — M86.00 ACUTE HEMATOGENOUS OSTEOMYELITIS, UNSPECIFIED SITE (HCC): ICD-10-CM

## 2024-10-08 DIAGNOSIS — Z51.5 HOSPICE CARE: Primary | ICD-10-CM

## 2024-10-08 DIAGNOSIS — Z51.5 PALLIATIVE CARE BY SPECIALIST: ICD-10-CM

## 2024-10-08 DIAGNOSIS — Z00.8 ENCOUNTER FOR ASSESSMENT OF DECISION-MAKING CAPACITY: ICD-10-CM

## 2024-10-08 LAB
ETHANOL EXG-MCNC: 0 MG/DL
GLUCOSE SERPL-MCNC: 60 MG/DL (ref 65–140)
GLUCOSE SERPL-MCNC: 69 MG/DL (ref 65–140)

## 2024-10-08 PROCEDURE — 99285 EMERGENCY DEPT VISIT HI MDM: CPT

## 2024-10-08 PROCEDURE — 82948 REAGENT STRIP/BLOOD GLUCOSE: CPT

## 2024-10-08 PROCEDURE — 99223 1ST HOSP IP/OBS HIGH 75: CPT

## 2024-10-08 PROCEDURE — 82075 ASSAY OF BREATH ETHANOL: CPT

## 2024-10-08 PROCEDURE — 85025 COMPLETE CBC W/AUTO DIFF WBC: CPT

## 2024-10-08 PROCEDURE — 87040 BLOOD CULTURE FOR BACTERIA: CPT

## 2024-10-08 PROCEDURE — 99285 EMERGENCY DEPT VISIT HI MDM: CPT | Performed by: EMERGENCY MEDICINE

## 2024-10-08 PROCEDURE — 36415 COLL VENOUS BLD VENIPUNCTURE: CPT

## 2024-10-08 RX ORDER — LORAZEPAM 1 MG/1
1 TABLET ORAL EVERY 8 HOURS PRN
Status: DISCONTINUED | OUTPATIENT
Start: 2024-10-08 | End: 2024-10-11

## 2024-10-08 RX ORDER — HALOPERIDOL 1 MG/1
1 TABLET ORAL 2 TIMES DAILY PRN
Status: DISCONTINUED | OUTPATIENT
Start: 2024-10-08 | End: 2024-10-28 | Stop reason: HOSPADM

## 2024-10-08 RX ORDER — LORAZEPAM 1 MG/1
1 TABLET ORAL 2 TIMES DAILY PRN
Status: DISCONTINUED | OUTPATIENT
Start: 2024-10-08 | End: 2024-10-08

## 2024-10-08 RX ORDER — CLONAZEPAM 1 MG/1
1 TABLET ORAL 3 TIMES DAILY PRN
Status: DISCONTINUED | OUTPATIENT
Start: 2024-10-08 | End: 2024-10-11

## 2024-10-08 RX ORDER — ALBUTEROL SULFATE 90 UG/1
2 INHALANT RESPIRATORY (INHALATION) EVERY 4 HOURS PRN
Status: DISCONTINUED | OUTPATIENT
Start: 2024-10-08 | End: 2024-10-28 | Stop reason: HOSPADM

## 2024-10-08 RX ORDER — OXYCODONE HYDROCHLORIDE 5 MG/1
5 TABLET ORAL EVERY 6 HOURS PRN
Status: DISCONTINUED | OUTPATIENT
Start: 2024-10-08 | End: 2024-10-08

## 2024-10-08 RX ORDER — METHADONE HYDROCHLORIDE 5 MG/1
5 TABLET ORAL 2 TIMES DAILY
Status: DISCONTINUED | OUTPATIENT
Start: 2024-10-08 | End: 2024-10-10

## 2024-10-08 RX ORDER — ENOXAPARIN SODIUM 100 MG/ML
40 INJECTION SUBCUTANEOUS DAILY
Status: DISCONTINUED | OUTPATIENT
Start: 2024-10-09 | End: 2024-10-28 | Stop reason: HOSPADM

## 2024-10-08 RX ORDER — ACETAMINOPHEN 325 MG/1
975 TABLET ORAL EVERY 8 HOURS
Status: DISCONTINUED | OUTPATIENT
Start: 2024-10-08 | End: 2024-10-14

## 2024-10-08 RX ORDER — BUPROPION HYDROCHLORIDE 150 MG/1
300 TABLET ORAL DAILY
Status: DISCONTINUED | OUTPATIENT
Start: 2024-10-09 | End: 2024-10-14

## 2024-10-08 RX ORDER — LORATADINE 10 MG/1
10 TABLET ORAL DAILY
Status: DISCONTINUED | OUTPATIENT
Start: 2024-10-09 | End: 2024-10-28 | Stop reason: HOSPADM

## 2024-10-08 RX ORDER — QUETIAPINE 150 MG/1
150 TABLET, FILM COATED, EXTENDED RELEASE ORAL
Status: DISCONTINUED | OUTPATIENT
Start: 2024-10-08 | End: 2024-10-28 | Stop reason: HOSPADM

## 2024-10-08 RX ORDER — OXYCODONE HYDROCHLORIDE 5 MG/1
5 TABLET ORAL
Status: DISCONTINUED | OUTPATIENT
Start: 2024-10-08 | End: 2024-10-24

## 2024-10-08 RX ORDER — ONDANSETRON 2 MG/ML
4 INJECTION INTRAMUSCULAR; INTRAVENOUS EVERY 6 HOURS PRN
Status: DISCONTINUED | OUTPATIENT
Start: 2024-10-08 | End: 2024-10-28 | Stop reason: HOSPADM

## 2024-10-08 NOTE — H&P
H&P - Hospitalist   Name: Alyssa Madrigal 64 y.o. female I MRN: [id number]  Unit/Bed#: ED 28 I Date of Admission: 10/8/2024   Date of Service: 10/8/2024 I Hospital Day: 0     Assessment & Plan  Osteomyelitis (HCC)  MRI in 7/2024  1.  Infectious/inflammatory changes from the occiput to cervical spine as   discussed above. Likely improved fluid collection in the left posterior   paraspinal soft tissues at C1-2 level. Otherwise no significant change from   06/29/2023 including epidural soft tissue thickening and enhancement and fluid   collection along the right aspect of C1-C2 joint and prevertebral space.   2.  Continued close follow-up is recommended.   Patient went to hospice and completed 3 months of doxycycline  Still complains of pain  Not meeting sepsis criteria  Brought in today due to patient does not want hospice and is willing to look at treatment options. In my assessment in the ED patient appear competent to make decisions. Oriented x3  Plan:  Not septic so will leave off AB  Neurosurgery consulted  Palliative for complex situation. Patient was revoked from hospice and sent here. Will need help with goals of care and pain management  Current regimentBupropion 150 mg once daily  Clonazepam 1 mg TID  Haloperidol 2m q 2 hours as needed. Will wean this down TID PRN since patient is no longer hospice  On ativan 1 mg every 2 hours at facility. Will decrease to TID PRN  Patient on methadone 5 mg BID for Drug hx. Will continue. Confirmed with PDMP  Oxycoddone 5 mg every 3 hrs as needed for pain. Will continue  Seoquel 150 mg once daily at night  Venlaflaxine 225 once daily  Completed doxycycline for 3 months on 10/07/2024 per facility med list.  Controlled substance agreement broken  Plan noted above  COPD (chronic obstructive pulmonary disease) with chronic bronchitis (HCC)  Continue albuterol  Major depressive disorder, recurrent episode with anxious distress (HCC)  Continue venlaflaxine  IVDU (intravenous drug  user)  Methadone. continue      VTE Pharmacologic Prophylaxis: VTE Score: 3 Moderate Risk (Score 3-4) - Pharmacological DVT Prophylaxis Ordered: enoxaparin (Lovenox).  Code Status: Level 3 - DNAR and DNI   Discussion with family: Attempted to update  (sister) via phone. Unable to contact.    Anticipated Length of Stay: Patient will be admitted on an inpatient basis with an anticipated length of stay of greater than 2 midnights secondary to Osteomyelitis.    History of Present Illness   Chief Complaint: Les Mdarigal is a 64 y.o. female with a past medical history of PMH includes chronic pain disorder, h/o substance use disorder (heroin) on methadone in the unit (checked PDMP) ,h/o chronic MRSA osteomyelitis of R femur leading to Girdlestone procedure (resection of the head and neck of the femur without hip replacement), COPD, HTN, B12 deficiency, bipolar disorder, h/o DVT, tobacco use.  cervical spine pain status post laminectomy, with hardware infection and abscess diagnosed on 8/2024, completed doxycycline 10/7/2024 per notes from facility. Complex hardware removal recommended by neurosurgery. She declined surgery and elected to go home with hospice.   Home hospice was started on 9/8/2025. She required transfer to the [facility] for uncontrolled pain, diversion of controlled substances (lorazepam), and tampering with controlled medication box. She had uptitration of meds in Hospice  Patient was brought in. Patient was found to disperse medication at her [facility], as a result was brought into the emergency room.. She was transferred to ED due to unsafe discharge.     Review of Systems   Constitutional:  Negative for chills and fever.   HENT:  Negative for ear pain and sore throat.    Eyes:  Negative for pain and visual disturbance.   Respiratory:  Negative for cough and shortness of breath.    Cardiovascular:  Negative for chest pain and palpitations.   Gastrointestinal:  Negative for abdominal pain and  "vomiting.   Genitourinary:  Negative for dysuria and hematuria.   Musculoskeletal:  Negative for arthralgias and back pain.   Skin:  Negative for color change and rash.   Neurological:  Negative for seizures and syncope.   All other systems reviewed and are negative.      Historical Information   Past Medical History:   Diagnosis Date    Back pain     Drug use     Heroin abuse (HCC)     Neck pain      Past Surgical History:   Procedure Laterality Date    BACK SURGERY      fusion    FL GUIDED NEEDLE PLAC BX/ASP/INJ  2020    FL GUIDED NEEDLE PLAC BX/ASP/INJ  2020    FL GUIDED NEEDLE PLAC BX/ASP/INJ  11/3/2020    FL GUIDED NEEDLE PLAC BX/ASP/INJ  2021    HIP SURGERY Bilateral     NECK SURGERY      fusion     Social History     Tobacco Use    Smoking status: Former     Current packs/day: 0.00     Types: Cigarettes     Quit date: 2022     Years since quittin.1    Smokeless tobacco: Never   Vaping Use    Vaping status: Never Used   Substance and Sexual Activity    Alcohol use: Not Currently    Drug use: Not Currently     Types: Heroin, Marijuana     Comment: heroin: 19  marijuana: 19    Sexual activity: Not on file     E-Cigarette/Vaping    E-Cigarette Use Never User      E-Cigarette/Vaping Substances    Nicotine No     THC No     CBD No     Flavoring No     Other No     Unknown No      Family history non-contributory  Social History:  Marital Status:    Occupation: none  Patient Pre-hospital Living Situation: Home  Patient Pre-hospital Level of Mobility: walks  Patient Pre-hospital Diet Restrictions: none    Meds/Allergies   (Not in a hospital admission)    Allergies   Allergen Reactions    Other      PT \"There is some other antibiotic that I took,its a really long name. I dont remember the name\"     Pollen Extract Other (See Comments)     Sinus headache.    Sulfa Antibiotics      Unknown reaction      Cefazolin Hives and Rash       Objective :  Temp:  [98.1 °F (36.7 °C)] 98.1 °F " (36.7 °C)  HR:  [79] 79  BP: (118)/(79) 118/79  Resp:  [18] 18  SpO2:  [99 %] 99 %  O2 Device: None (Room air)    Physical Exam  Vitals and nursing note reviewed.   Constitutional:       General: She is not in acute distress.     Appearance: She is well-developed.   HENT:      Head: Normocephalic and atraumatic.      Nose: Nose normal.      Mouth/Throat:      Mouth: Mucous membranes are moist.   Eyes:      Conjunctiva/sclera: Conjunctivae normal.   Cardiovascular:      Rate and Rhythm: Normal rate and regular rhythm.      Heart sounds: No murmur heard.  Pulmonary:      Effort: Pulmonary effort is normal. No respiratory distress.      Breath sounds: Normal breath sounds.   Abdominal:      Palpations: Abdomen is soft.      Tenderness: There is no abdominal tenderness.   Musculoskeletal:      Cervical back: Neck supple.      Right lower leg: No edema.      Left lower leg: No edema.   Skin:     General: Skin is warm and dry.      Capillary Refill: Capillary refill takes less than 2 seconds.   Neurological:      General: No focal deficit present.      Mental Status: She is alert and oriented to person, place, and time.   Psychiatric:         Mood and Affect: Mood normal.         Lines/Drains:            Lab Results: I have reviewed the following results:              Results from last 7 days   Lab Units 10/08/24  2034 10/08/24  1947   POC GLUCOSE mg/dl 69 60*     Lab Results   Component Value Date    HGBA1C 5.1 03/29/2023    HGBA1C 5.5 04/20/2022    HGBA1C 5.1 10/02/2021           Imaging Results Review: I personally reviewed the following image studies/reports in PACS and discussed pertinent findings with Radiology: MRI spine. My interpretation of the radiology images/reports is: none.  Other Study Results Review: EKG was reviewed.     Administrative Statements   I have spent a total time of 80 minutes in caring for this patient on the day of the visit/encounter including Diagnostic results, Prognosis, Risks and  benefits of tx options, Instructions for management, Patient and family education, Importance of tx compliance, Risk factor reductions, Impressions, Counseling / Coordination of care, Documenting in the medical record, Reviewing / ordering tests, medicine, procedures  , Obtaining or reviewing history  , and Communicating with other healthcare professionals .    ** Please Note: This note has been constructed using a voice recognition system. **

## 2024-10-08 NOTE — ED PROVIDER NOTES
Final diagnoses:   Hospice care     ED Disposition       ED Disposition   Admit    Condition   Stable    Date/Time   Tue Oct 8, 2024  8:09 PM    Comment   Case was discussed with [facility] and the patient's admission status was agreed to be Admission Status: inpatient status to the service of Dr. Christie.               Assessment & Plan       Medical Decision Making  Amount and/or Complexity of Data Reviewed  Labs: ordered.    Risk  Decision regarding hospitalization.    ASSESSMENT: Patient is a 64 y.o. female who presents to the ED for admission to hospice. .   PLAN: consult medicine.     ED Course as of 10/10/24 1754   Tue Oct 08, 2024   1915 Reached out to [facility] for possible admission    2008 Admitted to [facility]        Medications   acetaminophen (TYLENOL) tablet 975 mg (975 mg Oral Given 10/9/24 1237)   albuterol (PROVENTIL HFA,VENTOLIN HFA) inhaler 2 puff (has no administration in time range)   buPROPion (WELLBUTRIN XL) 24 hr tablet 300 mg (300 mg Oral Given 10/9/24 0845)   loratadine (CLARITIN) tablet 10 mg (10 mg Oral Given 10/9/24 0845)   clonazePAM (KlonoPIN) tablet 1 mg (1 mg Oral Given 10/9/24 1238)   haloperidol (HALDOL) tablet 1 mg (1 mg Oral Given 10/9/24 1122)   QUEtiapine (SEROquel XR) 24 hr tablet 150 mg (0 mg Oral Hold 10/8/24 2359)   venlafaxine (EFFEXOR-XR) 24 hr capsule 225 mg (225 mg Oral Given 10/9/24 0845)   ondansetron (ZOFRAN) injection 4 mg (has no administration in time range)   enoxaparin (LOVENOX) subcutaneous injection 40 mg (40 mg Subcutaneous Given 10/9/24 0845)   LORazepam (ATIVAN) tablet 1 mg (has no administration in time range)   oxyCODONE (ROXICODONE) IR tablet 5 mg (5 mg Oral Given 10/9/24 1238)       ED Risk Strat Scores                                               History of Present Illness       Chief Complaint   Patient presents with    Medical Problem     Per EMS patient being brought for admission to hospice         Past Medical History:   Diagnosis Date    Back pain     Drug use      Heroin abuse (HCC)     Neck pain       Past Surgical History:   Procedure Laterality Date    BACK SURGERY      fusion    FL GUIDED NEEDLE PLAC BX/ASP/INJ  2020    FL GUIDED NEEDLE PLAC BX/ASP/INJ  2020    FL GUIDED NEEDLE PLAC BX/ASP/INJ  11/3/2020    FL GUIDED NEEDLE PLAC BX/ASP/INJ  2021    HIP SURGERY Bilateral     NECK SURGERY      fusion      No family history on file.   Social History     Tobacco Use    Smoking status: Former     Current packs/day: 0.00     Types: Cigarettes     Quit date: 2022     Years since quittin.1    Smokeless tobacco: Never   Vaping Use    Vaping status: Never Used   Substance Use Topics    Alcohol use: Not Currently    Drug use: Not Currently     Types: Heroin, Marijuana     Comment: heroin: 19  marijuana: 19      E-Cigarette/Vaping    E-Cigarette Use Never User       E-Cigarette/Vaping Substances    Nicotine No     THC No     CBD No     Flavoring No     Other No     Unknown No       I have reviewed and agree with the history as documented.     HPI  Patient is a 64 y.o. female with PMHx MRSA bacteremia, chronic right hip osteomyelitis, cervical myelitis and abscess s/p fusion, IV drug use on methadone who presents to the ED via EMS for possible admission to hospice. There is no family at beside to assist with the history. Limited history from patient as she is tearful and difficult to understand. Per chart review from care everywhere, patient was under home hospice at [facility] due to decision not to pursue neurosurgical intervention after being diagnosed with cervical osteomyelitis and abscess. There was concern for drug diversion and patient was disenrolled from [facility] hospice yesterday per sister Jaycee request.     Review of Systems   Unable to perform ROS: Other           Objective       ED Triage Vitals   Temperature Pulse Blood Pressure Respirations SpO2 Patient Position - Orthostatic VS   10/08/24 1737 10/08/24 1737 10/08/24 173  10/08/24 1752 10/08/24 1737 10/08/24 1737   98.1 °F (36.7 °C) 79 118/79 18 99 % Sitting      Temp Source Heart Rate Source BP Location FiO2 (%) Pain Score    10/08/24 1737 10/08/24 1737 10/08/24 1737 -- 10/09/24 1237    Oral Monitor Right arm  8      Vitals      Date and Time Temp Pulse SpO2 Resp BP Pain Score FACES Pain Rating User   10/10/24 1741 -- -- -- -- -- 10 - Worst Possible Pain -- KM   10/10/24 1235 -- -- -- -- -- 10 - Worst Possible Pain -- KM   10/10/24 0922 -- -- -- -- -- 10 - Worst Possible Pain -- KM   10/10/24 0730 -- -- -- -- -- No Pain -- KM   10/10/24 0712 98.8 °F (37.1 °C) -- -- 12 137/91 -- -- DII   10/10/24 0056 -- -- -- -- 106/70 -- -- SW   10/10/24 0048 98.7 °F (37.1 °C) 91 95 % 16 99/69 -- -- DII   10/09/24 1902 -- -- -- -- -- No Pain -- UB   10/09/24 1605 -- -- -- -- -- 10 - Worst Possible Pain -- KM   10/09/24 1507 -- -- -- -- -- No Pain -- KM   10/09/24 1507 98.9 °F (37.2 °C) -- -- 14 125/88 -- -- DII   10/09/24 1507 -- 64 -- -- -- No Pain -- KM   10/09/24 1507 98.9 °F (37.2 °C) -- -- 14 125/88 -- -- DII   10/09/24 1403 -- 106 98 % 20 144/108 -- -- NEETA   10/09/24 1237 -- -- -- -- -- 8 -- BG   10/09/24 1000 -- 106 98 % 20 132/88 -- -- BG   10/09/24 0700 -- 92 95 % 18 127/83 -- -- BG   10/09/24 0200 -- 84 95 % 18 110/74 -- -- JS   10/09/24 0100 -- 88 96 % 18 103/69 -- -- JS   10/09/24 0000 -- 98 98 % 18 139/81 -- -- JS   10/08/24 2200 -- 68 97 % 18 132/86 -- -- NEETA   10/08/24 1752 -- -- -- 18 -- -- -- SB   10/08/24 1737 98.1 °F (36.7 °C) 79 99 % -- 118/79 -- -- SB            Physical Exam  Vitals and nursing note reviewed.   Constitutional:       General: She is not in acute distress.     Appearance: Normal appearance. She is well-developed. She is ill-appearing. She is not toxic-appearing or diaphoretic.      Comments: Chronically ill appearing  Appears older than stated age   HENT:      Head: Normocephalic and atraumatic.      Mouth/Throat:      Mouth: Mucous membranes are moist.       Pharynx: Oropharynx is clear.   Eyes:      General: No scleral icterus.        Right eye: No discharge.         Left eye: No discharge.      Extraocular Movements: Extraocular movements intact.      Conjunctiva/sclera: Conjunctivae normal.      Pupils: Pupils are equal, round, and reactive to light.   Cardiovascular:      Rate and Rhythm: Normal rate and regular rhythm.      Heart sounds: No murmur heard.  Pulmonary:      Effort: Pulmonary effort is normal. No respiratory distress.      Breath sounds: Normal breath sounds. No stridor. No wheezing, rhonchi or rales.   Chest:      Chest wall: No tenderness.   Abdominal:      Palpations: Abdomen is soft.      Tenderness: There is no abdominal tenderness.   Musculoskeletal:         General: No swelling or deformity.      Cervical back: Neck supple.   Skin:     General: Skin is warm and dry.      Capillary Refill: Capillary refill takes less than 2 seconds.   Neurological:      General: No focal deficit present.      Mental Status: She is alert and oriented to person, place, and time.   Psychiatric:         Mood and Affect: Mood normal.         Results Reviewed       Procedure Component Value Units Date/Time    Blood culture [[id number]] Collected: 10/08/24 2053    Lab Status: Preliminary result Specimen: Blood from Arm, Left Updated: 10/10/24 1401     Blood Culture No Growth at 24 hrs.    Blood culture [[id number]] Collected: 10/09/24 0000    Lab Status: Preliminary result Specimen: Blood from Arm, Right Updated: 10/10/24 0701     Blood Culture No Growth at 24 hrs.    Comprehensive metabolic panel [[id number]]  (Abnormal) Collected: 10/09/24 0736    Lab Status: Final result Specimen: Blood from Arm, Left Updated: 10/09/24 0848     Sodium 138 mmol/L      Potassium 3.3 mmol/L      Chloride 104 mmol/L      CO2 25 mmol/L      ANION GAP 9 mmol/L      BUN 23 mg/dL      Creatinine 0.81 mg/dL      Glucose 81 mg/dL      Calcium 8.8 mg/dL      Corrected Calcium 9.5 mg/dL      AST 13  U/L      ALT 12 U/L      Alkaline Phosphatase 123 U/L      Total Protein 6.0 g/dL      Albumin 3.1 g/dL      Total Bilirubin 0.44 mg/dL      eGFR 77 ml/min/1.73sq m     Narrative:      National Kidney Disease Foundation guidelines for Chronic Kidney Disease (CKD):     Stage 1 with normal or high GFR (GFR > 90 mL/min/1.73 square meters)    Stage 2 Mild CKD (GFR = 60-89 mL/min/1.73 square meters)    Stage 3A Moderate CKD (GFR = 45-59 mL/min/1.73 square meters)    Stage 3B Moderate CKD (GFR = 30-44 mL/min/1.73 square meters)    Stage 4 Severe CKD (GFR = 15-29 mL/min/1.73 square meters)    Stage 5 End Stage CKD (GFR <15 mL/min/1.73 square meters)  Note: GFR calculation is accurate only with a steady state creatinine    CBC and differential [[id number]]  (Abnormal) Collected: 10/08/24 2053    Lab Status: Final result Specimen: Blood from Arm, Left Updated: 10/09/24 0543     WBC 10.62 Thousand/uL      RBC 4.85 Million/uL      Hemoglobin 13.7 g/dL      Hematocrit 45.4 %      MCV 94 fL      MCH 28.2 pg      MCHC 30.2 g/dL      RDW 15.3 %      MPV 10.8 fL      Platelets 362 Thousands/uL      nRBC 0 /100 WBCs      Segmented % 61 %      Immature Grans % 1 %      Lymphocytes % 23 %      Monocytes % 9 %      Eosinophils Relative 5 %      Basophils Relative 1 %      Absolute Neutrophils 6.53 Thousands/µL      Absolute Immature Grans 0.05 Thousand/uL      Absolute Lymphocytes 2.49 Thousands/µL      Absolute Monocytes 0.94 Thousand/µL      Eosinophils Absolute 0.50 Thousand/µL      Basophils Absolute 0.11 Thousands/µL     Comprehensive metabolic panel [[id number]]  (Abnormal) Collected: 10/09/24 0417    Lab Status: Final result Specimen: Blood from Arm, Left Updated: 10/09/24 0453     Sodium 137 mmol/L      Potassium 3.2 mmol/L      Chloride 104 mmol/L      CO2 25 mmol/L      ANION GAP 8 mmol/L      BUN 22 mg/dL      Creatinine 0.75 mg/dL      Glucose 68 mg/dL      Calcium 8.1 mg/dL      Corrected Calcium 9.1 mg/dL      AST 15 U/L       ALT 10 U/L      Alkaline Phosphatase 111 U/L      Total Protein 5.3 g/dL      Albumin 2.8 g/dL      Total Bilirubin 0.38 mg/dL      eGFR 85 ml/min/1.73sq m     Narrative:      National Kidney Disease Foundation guidelines for Chronic Kidney Disease (CKD):     Stage 1 with normal or high GFR (GFR > 90 mL/min/1.73 square meters)    Stage 2 Mild CKD (GFR = 60-89 mL/min/1.73 square meters)    Stage 3A Moderate CKD (GFR = 45-59 mL/min/1.73 square meters)    Stage 3B Moderate CKD (GFR = 30-44 mL/min/1.73 square meters)    Stage 4 Severe CKD (GFR = 15-29 mL/min/1.73 square meters)    Stage 5 End Stage CKD (GFR <15 mL/min/1.73 square meters)  Note: GFR calculation is accurate only with a steady state creatinine    Platelet count [[id number]]  (Normal) Collected: 10/09/24 0417    Lab Status: Final result Specimen: Blood from Arm, Left Updated: 10/09/24 0431     Platelets 365 Thousands/uL      MPV 10.3 fL     Lactic acid, plasma (w/reflex if result > 2.0) [[id number]]  (Normal) Collected: 10/09/24 0000    Lab Status: Final result Specimen: Blood from Arm, Right Updated: 10/09/24 0027     LACTIC ACID 0.8 mmol/L     Narrative:      Result may be elevated if tourniquet was used during collection.    Fingerstick Glucose (POCT) [[id number]]  (Normal) Collected: 10/08/24 2034    Lab Status: Final result Specimen: Blood Updated: 10/08/24 2035     POC Glucose 69 mg/dl     Fingerstick Glucose (POCT) [[id number]]  (Abnormal) Collected: 10/08/24 1947    Lab Status: Final result Specimen: Blood Updated: 10/08/24 1948     POC Glucose 60 mg/dl     POCT alcohol breath test [[id number]]  (Normal) Resulted: 10/08/24 1947    Lab Status: Final result Updated: 10/08/24 1947     EXTBreath Alcohol 0.000            No orders to display       Complex Venous Access Line    Date/Time: 10/8/2024 10:59 PM    Performed by: Trinidad Steen MD  Authorized by: Trinidad Steen MD    Patient location:  ED  Other Assisting Provider: Yes (comment)    Consent:      Consent obtained:  Verbal    Consent given by:  Patient    Risks discussed:  Arterial puncture, bleeding, incorrect placement, infection and nerve damage    Alternatives discussed:  No treatment, delayed treatment, alternative treatment, observation and referral  Universal protocol:     Procedure explained and questions answered to patient or proxy's satisfaction: yes    Pre-procedure details:     Hand hygiene: Hand hygiene performed prior to insertion      Sterile barrier technique: All elements of maximal sterile technique followed    Procedure details:     Complex Venous Access Line Type: US Guided Peripheral IV      Peripheral IV Indications comment:  Difficult access    Orientation:  Left    Location:  Antecubital    Catheter size:  20 gauge    Patient evaluated for contraindications to access (i.e. fistula, thrombosis, etc): No      Approach: percutaneous technique used      Patient position:  Flat    Ultrasound image availability:  Not saved    Sterile ultrasound techniques: Sterile gel and sterile probe covers were used      Number of attempts:  1    Successful placement: yes      Landmarks identified: yes    Anesthesia (see MAR for exact dosages):     Anesthesia method:  None  Post-procedure details:     Post-procedure:  Dressing applied    Assessment:  Blood return through all ports and free fluid flow    Post-procedure complications: none      Patient tolerance of procedure:  Tolerated well, no immediate complications      ED Medication and Procedure Management   Prior to Admission Medications   Prescriptions Last Dose Informant Patient Reported? Taking?   QUEtiapine (SEROquel) 200 mg tablet  Self No No   Sig: Take 1 tablet (200 mg total) by mouth daily at bedtime   albuterol (PROVENTIL HFA,VENTOLIN HFA) 90 mcg/act inhaler  Self Yes No   Sig: INHALE TWO PUFFS BY MOUTH EVERY 6 HOURS AS NEEDED FOR wheezing OR FOR SHORTNESS OF BREATH   Patient not taking: Reported on 2/27/2024   buprenorphine-naloxone  (Suboxone) 8-2 mg  Self Yes No   Sig: PLACE THREE FILMS UNDER THE TONGUE ONCE EVERY DAY   Patient not taking: Reported on 2/27/2024   calcium carbonate (TUMS) 500 mg chewable tablet  Self No No   Sig: Chew 1 tablet (500 mg total) 3 (three) times a day as needed for indigestion or heartburn   Patient not taking: Reported on 2/27/2024   carisoprodol (SOMA) 250 MG   No No   Sig: Take 1 tablet (250 mg total) by mouth 3 (three) times a day for 10 days   cloNIDine (CATAPRES) 0.1 mg tablet  Self Yes No   Sig: Take 0.1 mg by mouth 3 (three) times a day   hydrOXYzine HCL (ATARAX) 50 mg tablet  Self Yes No   Sig: Take 50 mg by mouth 4 (four) times a day as needed   ibuprofen (MOTRIN) 400 mg tablet  Self No No   Sig: Take 1 tablet (400 mg total) by mouth every 6 (six) hours as needed for moderate pain - take with meals   nortriptyline (PAMELOR) 10 mg capsule  Self No No   Sig: Take 1 capsule (10 mg total) by mouth daily at bedtime   Patient not taking: Reported on 2/27/2024   ondansetron (ZOFRAN-ODT) 4 mg disintegrating tablet  Self Yes No   Sig: Take 4 mg by mouth every 8 (eight) hours as needed   pregabalin (LYRICA) 200 MG capsule   No No   Sig: Take 1 capsule (200 mg total) by mouth 3 (three) times a day for 10 days   venlafaxine (EFFEXOR-XR) 150 mg 24 hr capsule  Self Yes No   Sig: Take 262.5 mg by mouth daily 325mg   ziprasidone (GEODON) 20 mg capsule  Self Yes No   Sig: Take 20 mg by mouth 2 (two) times a day   Patient not taking: Reported on 2/27/2024      Facility-Administered Medications: None     Current Discharge Medication List        CONTINUE these medications which have NOT CHANGED    Details   albuterol (PROVENTIL HFA,VENTOLIN HFA) 90 mcg/act inhaler INHALE TWO PUFFS BY MOUTH EVERY 6 HOURS AS NEEDED FOR wheezing OR FOR SHORTNESS OF BREATH      buprenorphine-naloxone (Suboxone) 8-2 mg PLACE THREE FILMS UNDER THE TONGUE ONCE EVERY DAY      calcium carbonate (TUMS) 500 mg chewable tablet Chew 1 tablet (500 mg  total) 3 (three) times a day as needed for indigestion or heartburn  Qty: 20 tablet, Refills: 0    Associated Diagnoses: DDD (degenerative disc disease), lumbosacral      carisoprodol (SOMA) 250 MG Take 1 tablet (250 mg total) by mouth 3 (three) times a day for 10 days  Qty: 90 tablet, Refills: 0    Associated Diagnoses: Chronic back pain      cloNIDine (CATAPRES) 0.1 mg tablet Take 0.1 mg by mouth 3 (three) times a day      hydrOXYzine HCL (ATARAX) 50 mg tablet Take 50 mg by mouth 4 (four) times a day as needed      ibuprofen (MOTRIN) 400 mg tablet Take 1 tablet (400 mg total) by mouth every 6 (six) hours as needed for moderate pain - take with meals  Qty: 20 tablet, Refills: 0    Associated Diagnoses: Chronic back pain      nortriptyline (PAMELOR) 10 mg capsule Take 1 capsule (10 mg total) by mouth daily at bedtime  Qty: 30 capsule, Refills: 0    Associated Diagnoses: Depression      ondansetron (ZOFRAN-ODT) 4 mg disintegrating tablet Take 4 mg by mouth every 8 (eight) hours as needed      pregabalin (LYRICA) 200 MG capsule Take 1 capsule (200 mg total) by mouth 3 (three) times a day for 10 days  Qty: 90 capsule, Refills: 0    Associated Diagnoses: Chronic back pain      QUEtiapine (SEROquel) 200 mg tablet Take 1 tablet (200 mg total) by mouth daily at bedtime  Qty: 30 tablet, Refills: 0    Associated Diagnoses: Depression      venlafaxine (EFFEXOR-XR) 150 mg 24 hr capsule Take 262.5 mg by mouth daily 325mg      ziprasidone (GEODON) 20 mg capsule Take 20 mg by mouth 2 (two) times a day           No discharge procedures on file.  ED SEPSIS DOCUMENTATION   Time reflects when diagnosis was documented in both MDM as applicable and the Disposition within this note       Time User Action Codes Description Comment    10/8/2024  8:09 PM Trinidad Steen [Z51.5] Hospice care     10/8/2024  8:36 PM Rahul Christie [M86.00] Acute hematogenous osteomyelitis, unspecified site (HCC)                  Trinidad Steen  MD  10/10/24 8420

## 2024-10-09 LAB
ALBUMIN SERPL BCG-MCNC: 2.8 G/DL (ref 3.5–5)
ALBUMIN SERPL BCG-MCNC: 3.1 G/DL (ref 3.5–5)
ALP SERPL-CCNC: 111 U/L (ref 34–104)
ALP SERPL-CCNC: 123 U/L (ref 34–104)
ALT SERPL W P-5'-P-CCNC: 10 U/L (ref 7–52)
ALT SERPL W P-5'-P-CCNC: 12 U/L (ref 7–52)
ANION GAP SERPL CALCULATED.3IONS-SCNC: 8 MMOL/L (ref 4–13)
ANION GAP SERPL CALCULATED.3IONS-SCNC: 9 MMOL/L (ref 4–13)
AST SERPL W P-5'-P-CCNC: 13 U/L (ref 13–39)
AST SERPL W P-5'-P-CCNC: 15 U/L (ref 13–39)
BASOPHILS # BLD AUTO: 0.11 THOUSANDS/ΜL (ref 0–0.1)
BASOPHILS NFR BLD AUTO: 1 % (ref 0–1)
BILIRUB SERPL-MCNC: 0.38 MG/DL (ref 0.2–1)
BILIRUB SERPL-MCNC: 0.44 MG/DL (ref 0.2–1)
BUN SERPL-MCNC: 22 MG/DL (ref 5–25)
BUN SERPL-MCNC: 23 MG/DL (ref 5–25)
CALCIUM ALBUM COR SERPL-MCNC: 9.1 MG/DL (ref 8.3–10.1)
CALCIUM ALBUM COR SERPL-MCNC: 9.5 MG/DL (ref 8.3–10.1)
CALCIUM SERPL-MCNC: 8.1 MG/DL (ref 8.4–10.2)
CALCIUM SERPL-MCNC: 8.8 MG/DL (ref 8.4–10.2)
CHLORIDE SERPL-SCNC: 104 MMOL/L (ref 96–108)
CHLORIDE SERPL-SCNC: 104 MMOL/L (ref 96–108)
CO2 SERPL-SCNC: 25 MMOL/L (ref 21–32)
CO2 SERPL-SCNC: 25 MMOL/L (ref 21–32)
CREAT SERPL-MCNC: 0.75 MG/DL (ref 0.6–1.3)
CREAT SERPL-MCNC: 0.81 MG/DL (ref 0.6–1.3)
EOSINOPHIL # BLD AUTO: 0.5 THOUSAND/ΜL (ref 0–0.61)
EOSINOPHIL NFR BLD AUTO: 5 % (ref 0–6)
ERYTHROCYTE [DISTWIDTH] IN BLOOD BY AUTOMATED COUNT: 15.3 % (ref 11.6–15.1)
GFR SERPL CREATININE-BSD FRML MDRD: 77 ML/MIN/1.73SQ M
GFR SERPL CREATININE-BSD FRML MDRD: 85 ML/MIN/1.73SQ M
GLUCOSE SERPL-MCNC: 68 MG/DL (ref 65–140)
GLUCOSE SERPL-MCNC: 81 MG/DL (ref 65–140)
HCT VFR BLD AUTO: 45.4 % (ref 34.8–46.1)
HGB BLD-MCNC: 13.7 G/DL (ref 11.5–15.4)
IMM GRANULOCYTES # BLD AUTO: 0.05 THOUSAND/UL (ref 0–0.2)
IMM GRANULOCYTES NFR BLD AUTO: 1 % (ref 0–2)
LACTATE SERPL-SCNC: 0.8 MMOL/L (ref 0.5–2)
LYMPHOCYTES # BLD AUTO: 2.49 THOUSANDS/ΜL (ref 0.6–4.47)
LYMPHOCYTES NFR BLD AUTO: 23 % (ref 14–44)
MCH RBC QN AUTO: 28.2 PG (ref 26.8–34.3)
MCHC RBC AUTO-ENTMCNC: 30.2 G/DL (ref 31.4–37.4)
MCV RBC AUTO: 94 FL (ref 82–98)
MONOCYTES # BLD AUTO: 0.94 THOUSAND/ΜL (ref 0.17–1.22)
MONOCYTES NFR BLD AUTO: 9 % (ref 4–12)
NEUTROPHILS # BLD AUTO: 6.53 THOUSANDS/ΜL (ref 1.85–7.62)
NEUTS SEG NFR BLD AUTO: 61 % (ref 43–75)
NRBC BLD AUTO-RTO: 0 /100 WBCS
PLATELET # BLD AUTO: 362 THOUSANDS/UL (ref 149–390)
PLATELET # BLD AUTO: 365 THOUSANDS/UL (ref 149–390)
PMV BLD AUTO: 10.3 FL (ref 8.9–12.7)
PMV BLD AUTO: 10.8 FL (ref 8.9–12.7)
POTASSIUM SERPL-SCNC: 3.2 MMOL/L (ref 3.5–5.3)
POTASSIUM SERPL-SCNC: 3.3 MMOL/L (ref 3.5–5.3)
PROT SERPL-MCNC: 5.3 G/DL (ref 6.4–8.4)
PROT SERPL-MCNC: 6 G/DL (ref 6.4–8.4)
RBC # BLD AUTO: 4.85 MILLION/UL (ref 3.81–5.12)
SODIUM SERPL-SCNC: 137 MMOL/L (ref 135–147)
SODIUM SERPL-SCNC: 138 MMOL/L (ref 135–147)
WBC # BLD AUTO: 10.62 THOUSAND/UL (ref 4.31–10.16)

## 2024-10-09 PROCEDURE — 83605 ASSAY OF LACTIC ACID: CPT

## 2024-10-09 PROCEDURE — 99418 PROLNG IP/OBS E/M EA 15 MIN: CPT | Performed by: PHYSICIAN ASSISTANT

## 2024-10-09 PROCEDURE — 99223 1ST HOSP IP/OBS HIGH 75: CPT | Performed by: PHYSICIAN ASSISTANT

## 2024-10-09 PROCEDURE — 36415 COLL VENOUS BLD VENIPUNCTURE: CPT

## 2024-10-09 PROCEDURE — 87040 BLOOD CULTURE FOR BACTERIA: CPT

## 2024-10-09 PROCEDURE — 99223 1ST HOSP IP/OBS HIGH 75: CPT | Performed by: INTERNAL MEDICINE

## 2024-10-09 PROCEDURE — 99232 SBSQ HOSP IP/OBS MODERATE 35: CPT | Performed by: INTERNAL MEDICINE

## 2024-10-09 PROCEDURE — 85049 AUTOMATED PLATELET COUNT: CPT

## 2024-10-09 PROCEDURE — 80053 COMPREHEN METABOLIC PANEL: CPT

## 2024-10-09 PROCEDURE — 92610 EVALUATE SWALLOWING FUNCTION: CPT

## 2024-10-09 RX ORDER — HYDROMORPHONE HCL/PF 1 MG/ML
0.5 SYRINGE (ML) INJECTION EVERY 6 HOURS PRN
Status: DISCONTINUED | OUTPATIENT
Start: 2024-10-09 | End: 2024-10-11

## 2024-10-09 RX ORDER — OXYCODONE HYDROCHLORIDE 10 MG/1
10 TABLET ORAL EVERY 6 HOURS PRN
Status: DISCONTINUED | OUTPATIENT
Start: 2024-10-09 | End: 2024-10-11

## 2024-10-09 RX ADMIN — ACETAMINOPHEN 975 MG: 325 TABLET ORAL at 12:37

## 2024-10-09 RX ADMIN — METHADONE HYDROCHLORIDE 5 MG: 5 TABLET ORAL at 08:45

## 2024-10-09 RX ADMIN — OXYCODONE HYDROCHLORIDE 5 MG: 5 TABLET ORAL at 12:38

## 2024-10-09 RX ADMIN — VENLAFAXINE HYDROCHLORIDE 225 MG: 150 CAPSULE, EXTENDED RELEASE ORAL at 08:45

## 2024-10-09 RX ADMIN — ACETAMINOPHEN 975 MG: 325 TABLET ORAL at 22:02

## 2024-10-09 RX ADMIN — QUETIAPINE 150 MG: 150 TABLET, FILM COATED, EXTENDED RELEASE ORAL at 23:43

## 2024-10-09 RX ADMIN — METHADONE HYDROCHLORIDE 5 MG: 5 TABLET ORAL at 22:01

## 2024-10-09 RX ADMIN — LORATADINE 10 MG: 10 TABLET ORAL at 08:45

## 2024-10-09 RX ADMIN — OXYCODONE HYDROCHLORIDE 10 MG: 10 TABLET ORAL at 16:05

## 2024-10-09 RX ADMIN — CLONAZEPAM 1 MG: 1 TABLET ORAL at 12:38

## 2024-10-09 RX ADMIN — HALOPERIDOL 1 MG: 1 TABLET ORAL at 11:22

## 2024-10-09 RX ADMIN — ENOXAPARIN SODIUM 40 MG: 40 INJECTION SUBCUTANEOUS at 08:45

## 2024-10-09 RX ADMIN — LORAZEPAM 1 MG: 1 TABLET ORAL at 18:43

## 2024-10-09 RX ADMIN — BUPROPION HYDROCHLORIDE 300 MG: 150 TABLET, EXTENDED RELEASE ORAL at 08:45

## 2024-10-09 NOTE — CONSULTS
Inpatient Consultation - Palliative and Supportive Care   Alyssa Madrigal 64 y.o. female [id number]    Patient Active Problem List   Diagnosis    B12 deficiency    Chronic back pain    Cocaine abuse (HCC)    Controlled substance agreement broken    COPD (chronic obstructive pulmonary disease) with chronic bronchitis (HCC)    DDD (degenerative disc disease), lumbosacral    Discitis of lumbar region    Depression    Major depressive disorder, recurrent episode with anxious distress (HCC)    Mechanical breakdown of internal orthopedic device (HCC)    Osteoarthrosis, unspecified whether generalized or localized, pelvic region and thigh    Osteomyelitis ([facility])    Post laminectomy syndrome    Posttraumatic stress disorder    Prolonged Q-T interval on ECG    Right foot drop    Tobacco abuse    Left forearm abscess    IVDU (intravenous drug user)    history of Hepatitis C    Sacral decubitus ulcer (present on admission)     Aftercare following surgery of the musculoskeletal system    Right hip pain         Plan:  1. Symptom management   Patient is usually on: Tylenol 975 every 8 scheduled, Wellbutrin 300 mg daily, methadone 10 mg 3 times daily, Seroquel 150 mg daily, venlafaxine 225 mg daily, Klonopin 1 mg q. 3 times daily as needed, Haldol 1 mg every 2 as needed, Ativan 1 mg every 2 as needed, oxy 5 mg every 3 as needed.      Given that patient has been diverting these meds and unclear what patient has been actually taking at home primary team has started patient on Klonopin 1 mg Q 3 times daily as needed, Haldol 1 mg Q twice daily as needed, Ativan 1 mg Q8 as needed, Oxy 5 mg every 3 as needed.     Recommend current regimen for pain:   Tylenol 975 every 8hr scheduled  Methadone 5 mg twice daily  Oxycodone 5mg every 3hr as needed, 10 mg every 6hr as needed  Dilaudid 0.5 every 6 as needed for breakthrough    Continue Klonopin 1 mg 3 times daily PRN, Haldol 1 mg twice daily PRN, Ativan 1 mg every 8 PRN    2. Goals - Will need  further goals of care discussion tmrw with Jaycee and patient      Code Status:  Level 3   Decisional apparatus:  Unclear if competent as patient us not able to talk due to crying    Advance Directive / Living Will / POLST:  Jaycee, sister        IDENTIFICATION:  Inpatient consult to Palliative Care  Consult performed by: Nona Guajardo MD  Consult ordered by: Rahul Christie MD        Physician Requesting Consult: Victorino Thorne MD  Reason for Consult / Principal Problem: Uncontrolled Pain      NARRATIVE AND INTERVAL HISTORY:       Alyssa Madrigal is a 64 y.o. female with history of MRSA bacteremia, right hip chronic osteomyelitis, cervical osteomyelitis and abscess status post fusion, IV drug use on methadone presenting with neck pain after dismissal from hospice.  Patient was under home hospice at [facility] due to decision to not undergo neurosurgery after diagnosis with cervical osteomyelitis and abscess.  Patient started home hospice on 9/24 however was unable to tolerate home hospice due to uncontrolled pain and was admitted to inpatient hospice at [facility].  At that time she was changed from Suboxone to methadone and multiple pain medication modalities were tried in order to get a good pain regimen under order.  Jaycee patient's sister is an active drug user and there was concern that there was diversion of medications from her controlled lock box in home hospice.  Jaycee and patient signed a narcotics contract on 10/7 when discharged from inpatient hospice to home hospice.  Nurse came to patient's home and did a pill count and had discrepancies of all medications including lorazepam Haldol bupropion oxycodone and methadone.  Conversation was had with Jaycee over the phone yesterday regarding these discrepancies and according to hospice director Jaycee fired the hospice team after hospice stated that patient would need to go to inpatient hospice unit and then nursing home due to the medication  "diversion.  Patient states in the room \" I have never been fired from something before\", and is very tearful and sobbing.  Difficult to talk to patient as she continues to cry.  She states that she would like for the provider to come back tomorrow to have a discussion regarding plan with Jaycee present.    Past Medical History:   Diagnosis Date    Back pain     Drug use     Heroin abuse (HCC)     Neck pain      Past Surgical History:   Procedure Laterality Date    BACK SURGERY      fusion    FL GUIDED NEEDLE PLAC BX/ASP/INJ  2020    FL GUIDED NEEDLE PLAC BX/ASP/INJ  2020    FL GUIDED NEEDLE PLAC BX/ASP/INJ  11/3/2020    FL GUIDED NEEDLE PLAC BX/ASP/INJ  2021    HIP SURGERY Bilateral     NECK SURGERY      fusion     Social History     Socioeconomic History    Marital status:      Spouse name: Not on file    Number of children: Not on file    Years of education: Not on file    Highest education level: Not on file   Occupational History    Not on file   Tobacco Use    Smoking status: Former     Current packs/day: 0.00     Types: Cigarettes     Quit date: 2022     Years since quittin.1    Smokeless tobacco: Never   Vaping Use    Vaping status: Never Used   Substance and Sexual Activity    Alcohol use: Not Currently    Drug use: Not Currently     Types: Heroin, Marijuana     Comment: heroin: 19  marijuana: 19    Sexual activity: Not on file   Other Topics Concern    Not on file   Social History Narrative    Not on file     Social Determinants of Health     Financial Resource Strain: Low Risk  (2024)    Received from [facility]    Overall Financial Resource Strain (CARDIA)     Difficulty of Paying Living Expenses: Not hard at all   Food Insecurity: No Food Insecurity (2024)    Received from [facility]    Hunger Vital Sign     Worried About Running Out of Food in the Last Year: Never true     Ran Out of Food in the Last Year: Never true " "  Transportation Needs: No Transportation Needs (8/26/2024)    Received from [facility]    PRAPARE - Transportation     Lack of Transportation (Medical): No     Lack of Transportation (Non-Medical): No   Physical Activity: Not on file   Stress: Not on file   Social Connections: Feeling Socially Integrated (4/12/2023)    [facility]    OASIS : Social Isolation     How often do you feel lonely or isolated from those around you?: Never   Intimate Partner Violence: Not At Risk (8/26/2024)    Received from [facility]    Humiliation, Afraid, Rape, and Kick questionnaire     Fear of Current or Ex-Partner: No     Emotionally Abused: No     Physically Abused: No     Sexually Abused: No   Housing Stability: Low Risk  (8/26/2024)    Received from [facility]    Housing Stability Vital Sign     Unable to Pay for Housing in the Last Year: No     Number of Times Moved in the Last Year: 0     Homeless in the Last Year: No     No family history on file.    MEDICATIONS / ALLERGIES:    all current active meds have been reviewed    Allergies   Allergen Reactions    Other      PT \"There is some other antibiotic that I took,its a really long name. I dont remember the name\"     Pollen Extract Other (See Comments)     Sinus headache.    Sulfa Antibiotics      Unknown reaction      Cefazolin Hives and Rash       OBJECTIVE:    Physical Exam  Physical Exam  Vitals and nursing note reviewed.   Constitutional:       General: She is not in acute distress.     Appearance: She is well-developed.   HENT:      Head: Normocephalic and atraumatic.   Eyes:      Conjunctiva/sclera: Conjunctivae normal.   Cardiovascular:      Rate and Rhythm: Normal rate and regular rhythm.      Heart sounds: No murmur heard.  Pulmonary:      Effort: Pulmonary effort is normal. No respiratory distress.      Breath sounds: Normal breath sounds. "   Abdominal:      Palpations: Abdomen is soft.      Tenderness: There is no abdominal tenderness.   Musculoskeletal:         General: No swelling.      Cervical back: Neck supple.   Skin:     General: Skin is warm and dry.      Capillary Refill: Capillary refill takes less than 2 seconds.   Neurological:      Mental Status: She is alert.   Psychiatric:         Mood and Affect: Mood normal.         Lab Results: I have personally reviewed pertinent labs.    I have spent a total time of 30 minutes in caring for this patient on the day of the visit/encounter including Diagnostic results, Risks and benefits of tx options, and Patient and family education.         I have reviewed the patient's controlled substance dispensing history in the Prescription Drug Monitoring Program in compliance with the [facility] regulations before prescribing any controlled substances.         We appreciate the invitation to be involved in this patient's care. Please do not hesitate to reach our on call provider through our clinic answering service at [phone] should you have acute symptom control concerns.    Nona Guajardo MD  Palliative and Supportive Care  Clinic/Answering Service: [phone]  You can find me on [employer] Secure Chat!

## 2024-10-09 NOTE — ASSESSMENT & PLAN NOTE
Patient with reported history of cervical epidural abscess s/p fusion  Remote h/o C4-5 ACDF in [place], C1-5 PCDF March 2023 at [facility] for cervical stenosis s/p fall out of bed with progressive myelopathy  Post op course complicated by MRSA infection, treated with IV antibiotics  Most recent admission in August 2024 showed epidural abscess with infection up to suboccipital area with evidence of hardware lucency; IR aspiration + MRSA. NSX recommended hardware removal and cervical traction (HALO) but patient refused and d/c with home hospice and oral antibiotics  Notes yesterday show patient discharged from home hospice for medication diversion, IPU recommended but family refused and took patient off hospice care  H/o heroin / methamphetamine abuse, on methadone   Currently unclear as to why she is presenting to the hospital as patient is refusing treatment    Imaging:  No imaging available for review as everything is through [facility]  MRI cervical spine w/wo, 8/28/24 (radiology read only): There has been development of cranial settling with the dens extending into the foramen magnum. In addition there appears to be widening of the C1 dens articulation concerning for injury to the cruciate ligament .It should be noted that the patient's right internal carotid artery loops anterior to the cervical spine. Therefore attention to this should be paid if there is any concern for anterior spinal surgery. There Is a rim-enhancing fluid collection in the soft tissues inferior to the occiput. Unsure this represents a pseudomeningocele as it appears that this tract of fluid can possibly be followed anteriorly and to the left behind the surgical defect. Alternatively could represent an abscess. There is abnormal low T1-weighted signal and enhancement involving the left side of the skull base (occipital bone, left occipital condyle and left side of the C1 vertebral body) and posterior inferior spinal musculature concerning for infection of  the left skull base and paraspinal musculature .There is some additional increased as T2-weighted signal in the spinal cord at the C2-3 level which may be artifactual however I cannot exclude some edema in the cord. There is some high T2-weighted signal ventral to the cervical spine consistent with edema. Abnormal enhancement in the spinal canal similar prior studies but more extensive again findings are concerning for infection/inflammation possibly some venous congestion.     Plan:  Continue to monitor neurological exam  Currently GCS 14.  Patient in significant pain but cannot identify where.  Right upper extremity weakness 4-5.  Right lower extremity weakness secondary to extensive right hip surgery which may be at baseline.  Sensation intact.  Anterior and posterior cervical incisions well-healed without evidence of infection at this time.  Palliative care consulted, appreciate recommendations  At this time recommend extensive discussion with patient and family.  Need to establish if patient has capacity to make decisions.  Also should clarify who is responsible for making decision should she not have capacity.  After extensive review in care everywhere, it seems that she refused an extensive surgical surgery and halo placement and elected to go home with hospice care.  She continues to adamantly decline any surgical intervention at this time.  Should patient and family wish to continue with full care, neurosurgery recommends the following:  MRI brain and cervical spine with and without contrast to evaluate extent of infection / osteomyelitis  CT cervical spine w/o to evaluate hardware  Previous imaging should be pushed into our system from [facility]  Infection work up with BC x2, ESR, CRP  ID consult to determine need for antibiotics. Per [facility] neurosurgery note from August 2024, patient should be on lifelong doxycycline but stopped at some point as she was lost to follow up  Medical management per primary  team  Pain control per primary team, palliative vs APS based on appropriateness given history of drug abuse and methadone use  PT/OT  DVT ppx: SCDs, ok for pharm ppx    Neurosurgery will follow from the periphery.  Should goals of care discussion result with patient wanting full care and workup, neurosurgery can see patient after the above recommendations are completed for possible surgical discussion if warranted.  Otherwise, patient can follow-up on an as-needed basis.  Strongly recommend patient follows up with [facility] neurosurgery as she is very well-known to their practice.  Please call questions or concerns.

## 2024-10-09 NOTE — PROGRESS NOTES
Progress Note - Hospitalist   Name: Alyssa Madrigal 64 y.o. female I MRN: [id number]  Unit/Bed#: [facility] 807-01 I Date of Admission: 10/8/2024   Date of Service: 10/9/2024 I Hospital Day: 1    Assessment & Plan  Osteomyelitis (HCC)  MRI in 7/2024   Infectious/inflammatory changes from the occiput to cervical spine as   discussed above. Likely improved fluid collection in the left posterior   paraspinal soft tissues at C1-2 level. Otherwise no significant change from   06/29/2023 including epidural soft tissue thickening and enhancement and fluid   collection along the right aspect of C1-C2 joint and prevertebral space.     Patient went to hospice and completed 3 months of doxycycline  Still complains of pain - palliative care following for assistance with pain management  Not meeting sepsis criteria - defer antibiotics for now  Pt was discharged for [facility] hospice services due to suspected diversion of narcotics.  Pt was sent to the ED at [facility] for an opinion regarding plan of care   Plan:   Not septic so will monitor off antibiotics at this time  Neurosurgery consultation appreciated, will discuss plan of care with the patient's sister prior to ordered any additional workup  Controlled substance agreement broken  Plan noted above  COPD (chronic obstructive pulmonary disease) with chronic bronchitis (HCC)  Continue albuterol  Major depressive disorder, recurrent episode with anxious distress (HCC)  Continue venlaflaxine  IVDU (intravenous drug user)  Methadone. continue    VTE Pharmacologic Prophylaxis: VTE Score: 3 Moderate Risk (Score 3-4) - Pharmacological DVT Prophylaxis Ordered: heparin.    Mobility:      JH-HLM Goal NOT achieved. Continue with multidisciplinary rounding and encourage appropriate mobility to improve upon JH-HLM goals.    Patient Centered Rounds: I performed bedside rounds with nursing staff today.   Discussions with Specialists or Other Care Team Provider: nurse, CM, neurosurgery    Education and  Discussions with Family / Patient:  attempted to call the patient's daughter, she reports that her daughter is .     Current Length of Stay: 1 day(s)  Current Patient Status: Inpatient   Certification Statement: The patient will continue to require additional inpatient hospital stay due to discharge planning  Discharge Plan:  family meeting with the patient's sister pending    Code Status: Level 3 - DNAR and DNI    Subjective   Limited due to confusion. She is emotional and asking for analgesics    Objective :  Temp:  [98.1 °F (36.7 °C)-98.9 °F (37.2 °C)] 98.9 °F (37.2 °C)  HR:  [] 64  BP: (103-144)/() 125/88  Resp:  [14-20] 14  SpO2:  [95 %-99 %] 98 %  O2 Device: None (Room air)    Body mass index is 19.96 kg/m².     Input and Output Summary (last 24 hours):     Intake/Output Summary (Last 24 hours) at 10/9/2024 1640  Last data filed at 10/9/2024 0708  Gross per 24 hour   Intake --   Output 525 ml   Net -525 ml       Physical Exam  Constitutional:       General: She is in acute distress.      Appearance: She is not toxic-appearing.   HENT:      Head: Normocephalic and atraumatic.      Nose: Nose normal.   Eyes:      Extraocular Movements: Extraocular movements intact.   Cardiovascular:      Rate and Rhythm: Normal rate and regular rhythm.   Pulmonary:      Effort: Pulmonary effort is normal.      Breath sounds: No wheezing or rales.   Musculoskeletal:      Right lower leg: No edema.      Left lower leg: No edema.   Skin:     General: Skin is warm and dry.   Neurological:      Mental Status: She is disoriented.   Psychiatric:         Mood and Affect: Mood normal.         Behavior: Behavior normal.           Lines/Drains:              Lab Results: I have reviewed the following results:   Results from last 7 days   Lab Units 10/09/24  0417 10/08/24  2053   WBC Thousand/uL  --  10.62*   HEMOGLOBIN g/dL  --  13.7   HEMATOCRIT %  --  45.4   PLATELETS Thousands/uL 365 362   SEGS PCT %  --  61   LYMPHO  PCT %  --  23   MONO PCT %  --  9   EOS PCT %  --  5     Results from last 7 days   Lab Units 10/09/24  0736   SODIUM mmol/L 138   POTASSIUM mmol/L 3.3*   CHLORIDE mmol/L 104   CO2 mmol/L 25   BUN mg/dL 23   CREATININE mg/dL 0.81   ANION GAP mmol/L 9   CALCIUM mg/dL 8.8   ALBUMIN g/dL 3.1*   TOTAL BILIRUBIN mg/dL 0.44   ALK PHOS U/L 123*   ALT U/L 12   AST U/L 13   GLUCOSE RANDOM mg/dL 81         Results from last 7 days   Lab Units 10/08/24  2034 10/08/24  1947   POC GLUCOSE mg/dl 69 60*         Results from last 7 days   Lab Units 10/09/24  0000   LACTIC ACID mmol/L 0.8       Recent Cultures (last 7 days):   Results from last 7 days   Lab Units 10/09/24  0000 10/08/24  2053   BLOOD CULTURE  Received in Microbiology Lab. Culture in Progress. Received in Microbiology Lab. Culture in Progress.       Imaging Results Review: No pertinent imaging studies reviewed.  Other Study Results Review: No additional pertinent studies reviewed.    Last 24 Hours Medication List:     Current Facility-Administered Medications:     acetaminophen (TYLENOL) tablet 975 mg, Q8H    albuterol (PROVENTIL HFA,VENTOLIN HFA) inhaler 2 puff, Q4H PRN    buPROPion (WELLBUTRIN XL) 24 hr tablet 300 mg, Daily    clonazePAM (KlonoPIN) tablet 1 mg, TID PRN    enoxaparin (LOVENOX) subcutaneous injection 40 mg, Daily    haloperidol (HALDOL) tablet 1 mg, BID PRN    HYDROmorphone (DILAUDID) injection 0.5 mg, Q6H PRN    loratadine (CLARITIN) tablet 10 mg, Daily    LORazepam (ATIVAN) tablet 1 mg, Q8H PRN    methadone (Dolophine) tablet 5 mg, BID    ondansetron (ZOFRAN) injection 4 mg, Q6H PRN    oxyCODONE (ROXICODONE) immediate release tablet 10 mg, Q6H PRN    oxyCODONE (ROXICODONE) IR tablet 5 mg, Q3H PRN    QUEtiapine (SEROquel XR) 24 hr tablet 150 mg, HS    venlafaxine (EFFEXOR-XR) 24 hr capsule 225 mg, Daily    Administrative Statements   Today, Patient Was Seen By: Victorino Thorne MD  I have spent a total time of 40 minutes in caring for this patient  on the day of the visit/encounter including Documenting in the medical record, Reviewing / ordering tests, medicine, procedures  , Obtaining or reviewing history  , and Communicating with other healthcare professionals .    **Please Note: This note may have been constructed using a voice recognition system.**

## 2024-10-09 NOTE — UTILIZATION REVIEW
Initial Clinical Review    Admission: Date/Time/Statement:   Admission Orders (From admission, onward)       Ordered        10/08/24 2010  INPATIENT ADMISSION  Once                          Orders Placed This Encounter   Procedures    INPATIENT ADMISSION     Standing Status:   Standing     Number of Occurrences:   1     Order Specific Question:   Level of Care     Answer:   Med Surg [16]     Order Specific Question:   Estimated length of stay     Answer:   More than 2 Midnights     Order Specific Question:   Certification     Answer:   I certify that inpatient services are medically necessary for this patient for a duration of greater than two midnights. See H&P and MD Progress Notes for additional information about the patient's course of treatment.     ED Arrival Information       Expected   -    Arrival   10/8/2024 17:30    Acuity   Urgent              Means of arrival   Ambulance    Escorted by   [facility]    Service   Hospitalist    Admission type   Emergency              Arrival complaint   Medical Problem             Chief Complaint   Patient presents with    Medical Problem     Per EMS patient being brought for admission to hospice         Initial Presentation: 64 y.o. female with PMHx includes chronic pain disorder, substance use disorder (heroin) on methadone in the unit (checked PDMP) ,h/o chronic MRSA osteomyelitis of R femur leading to Girdlestone procedure (resection of the head and neck of the femur without hip replacement), COPD, HTN, B12 deficiency, bipolar disorder, h/o DVT, tobacco use, cervical spine pain status post laminectomy, with hardware infection and abscess diagnosed on 8/2024, completed doxycycline 10/7/2024, who presented on 108//24 to [facility] ED via EMS due to uncontrolled pain, diversion of controlled substances (lorazepam), and tampering with controlled medication box. She had uptitration of meds in Hospice. Patient was found to disperse medication at her  IPU, as a result was brought into the ED due to unsafe discharge.      Plan:  Admit Inpatient status to med surg dt Osteomyelitis :   Neurosurgery and Palliative Care consults, conitnue Bupropion, Clonazepam, Haloperidol prn, Ativan prn, methadone, oxycodone prn, seroquel and venlaflaxine. PT OT eval, IO and daily wts.     Anticipated Length of Stay/Certification Statement: Patient will be admitted on an inpatient basis with an anticipated length of stay of greater than 2 midnights secondary to Osteomyelitis.     Date: 10/9   Day 2:  Pt disoriented. She is emotional and asking for analgesics. Continue above tx plan including monitor off abx for now, Palliative Care following. Continue home meds. PT OT, CM following.     Date: 10/10  Day 3: Has surpassed a 2nd midnight with active treatments and services. No new concerns or complaints. CM following, family dynamics with alleged med diversion. Continue to monitor mental status, VS and labs, PT OT.     ED Treatment-Medication Administration from 10/08/2024 1730 to 10/09/2024 1028         Date/Time Order Dose Route Action     10/09/2024 0845 buPROPion (WELLBUTRIN XL) 24 hr tablet 300 mg 300 mg Oral Given     10/09/2024 0845 loratadine (CLARITIN) tablet 10 mg 10 mg Oral Given     10/09/2024 0845 methadone (Dolophine) tablet 5 mg 5 mg Oral Given     10/09/2024 0845 venlafaxine (EFFEXOR-XR) 24 hr capsule 225 mg 225 mg Oral Given     10/09/2024 0845 enoxaparin (LOVENOX) subcutaneous injection 40 mg 40 mg Subcutaneous Given            Scheduled Medications:  acetaminophen, 975 mg, Oral, Q8H  buPROPion, 300 mg, Oral, Daily  enoxaparin, 40 mg, Subcutaneous, Daily  loratadine, 10 mg, Oral, Daily  methadone, 5 mg, Oral, BID  QUEtiapine, 150 mg, Oral, HS  venlafaxine, 225 mg, Oral, Daily      Continuous IV Infusions: none     PRN Meds:  albuterol, 2 puff, Inhalation, Q4H PRN  clonazePAM, 1 mg, Oral, TID PRN  haloperidol, 1 mg, Oral, BID PRN x1  HYDROmorphone, 0.5 mg, Intravenous,  Q6H PRN  LORazepam, 1 mg, Oral, Q8H PRN x1  ondansetron, 4 mg, Intravenous, Q6H PRN  oxyCODONE, 10 mg, Oral, Q6H PRN x1  oxyCODONE, 5 mg, Oral, Q3H PRN x1      ED Triage Vitals   Temperature Pulse Respirations Blood Pressure SpO2 Pain Score   10/08/24 1737 10/08/24 1737 10/08/24 1752 10/08/24 1737 10/08/24 1737 10/09/24 1237   98.1 °F (36.7 °C) 79 18 118/79 99 % 8     Weight (last 2 days)       Date/Time Weight    10/09/24 15:07:29 52.8 (116.3)            Vital Signs (last 3 days)       Date/Time Temp Pulse Resp BP MAP (mmHg) SpO2 O2 Device Patient Position - Orthostatic VS Pain    10/10/24 1235 -- -- -- -- -- -- -- -- 10 - Worst Possible Pain    10/10/24 0922 -- -- -- -- -- -- -- -- 10 - Worst Possible Pain    10/10/24 0730 -- -- -- -- -- -- None (Room air) -- No Pain    10/10/24 07:12:34 98.8 °F (37.1 °C) -- 12 137/91 106 -- -- -- --    10/10/24 0056 -- -- -- 106/70 -- -- -- Lying --    10/10/24 00:48:18 98.7 °F (37.1 °C) 91 16 99/69 79 95 % -- -- --    10/09/24 1902 -- -- -- -- -- -- -- -- No Pain    10/09/24 1605 -- -- -- -- -- -- -- -- 10 - Worst Possible Pain    10/09/24 15:07:29 98.9 °F (37.2 °C) -- 14 125/88 100 -- None (Room air) -- No Pain    10/09/24 15:07:02 98.9 °F (37.2 °C) 64 14 125/88 100 -- -- -- No Pain    10/09/24 1403 -- 106 20 144/108 122 98 % -- -- --    10/09/24 1237 -- -- -- -- -- -- -- -- 8    10/09/24 1000 -- 106 20 132/88 105 98 % -- -- --    10/09/24 0700 -- 92 18 127/83 -- 95 % -- -- --    10/09/24 0200 -- 84 18 110/74 88 95 % None (Room air) Lying --    10/09/24 0100 -- 88 18 103/69 81 96 % None (Room air) Lying --    10/09/24 0000 -- 98 18 139/81 102 98 % None (Room air) Lying --    10/08/24 2200 -- 68 18 132/86 105 97 % None (Room air) Lying --    10/08/24 1752 -- -- 18 -- -- -- -- -- --    10/08/24 1737 98.1 °F (36.7 °C) 79 -- 118/79 -- 99 % None (Room air) Sitting --              Pertinent Labs/Diagnostic Test Results:   Radiology:  No orders to display     Cardiology:  No orders to  display     GI:  No orders to display           Results from last 7 days   Lab Units 10/09/24  0417 10/08/24  2053   WBC Thousand/uL  --  10.62*   HEMOGLOBIN g/dL  --  13.7   HEMATOCRIT %  --  45.4   PLATELETS Thousands/uL 365 362   TOTAL NEUT ABS Thousands/µL  --  6.53         Results from last 7 days   Lab Units 10/09/24  0736 10/09/24  0417   SODIUM mmol/L 138 137   POTASSIUM mmol/L 3.3* 3.2*   CHLORIDE mmol/L 104 104   CO2 mmol/L 25 25   ANION GAP mmol/L 9 8   BUN mg/dL 23 22   CREATININE mg/dL 0.81 0.75   EGFR ml/min/1.73sq m 77 85   CALCIUM mg/dL 8.8 8.1*     Results from last 7 days   Lab Units 10/09/24  0736 10/09/24  0417   AST U/L 13 15   ALT U/L 12 10   ALK PHOS U/L 123* 111*   TOTAL PROTEIN g/dL 6.0* 5.3*   ALBUMIN g/dL 3.1* 2.8*   TOTAL BILIRUBIN mg/dL 0.44 0.38     Results from last 7 days   Lab Units 10/08/24  2034 10/08/24  1947   POC GLUCOSE mg/dl 69 60*     Results from last 7 days   Lab Units 10/09/24  0736 10/09/24  0417   GLUCOSE RANDOM mg/dL 81 68         Results from last 7 days   Lab Units 10/09/24  0000   LACTIC ACID mmol/L 0.8     Results from last 7 days   Lab Units 10/09/24  0000 10/08/24  2053   BLOOD CULTURE  No Growth at 24 hrs. No Growth at 24 hrs.     Past Medical History:   Diagnosis Date    Back pain     Drug use     Heroin abuse (HCC)     Neck pain      Present on Admission:   COPD (chronic obstructive pulmonary disease) with chronic bronchitis (HCC)   IVDU (intravenous drug user)   Osteomyelitis (HCC)   Major depressive disorder, recurrent episode with anxious distress ([facility])      Admitting Diagnosis: Hospice care [Z51.5]  Known medical problems [Z78.9]  Acute hematogenous osteomyelitis, unspecified site (HCC) [M86.00]  Age/Sex: 64 y.o. female    Network Utilization Review Department  ATTENTION: Please call with any questions or concerns to [phone] and carefully listen to the prompts so that you are directed to the right person. All voicemails are confidential.   For  Discharge needs, contact Care Management DC Support Team at [phone] opt. 2  Send all requests for admission clinical reviews, approved or denied determinations and any other requests to dedicated fax number below belonging to the campus where the patient is receiving treatment. List of dedicated fax numbers for the Facilities:  FACILITY NAME UR FAX NUMBER   ADMISSION DENIALS (Administrative/Medical Necessity) [phone]   DISCHARGE SUPPORT TEAM (NETWORK) [phone]   PARENT CHILD HEALTH (Maternity/NICU/Pediatrics) [phone]   [facility] [phone]   [facility] [phone]   [facility] [phone]   [facility] [phone]   [facility] [phone]   [facility] [phone]   [facility] [phone]   [facility] [phone]   [facility] [phone]   [facility] [phone]   [facility] [phone]   [facility] [phone]

## 2024-10-09 NOTE — ED NOTES
PT CARE ASSUMED AT THIS TIME  PT PLACED IN HOSPITAL GOWN  BED LINENS CHANGED AND PT REPOSITIONED IN BED  PT PROVIDED ICE WATER  PT AWAITING INPATIENT BED     Caterina Conrad RN  10/09/24 2500

## 2024-10-09 NOTE — ASSESSMENT & PLAN NOTE
Patient discharged from home hospice for medication diversion on 10/8/24. Recommended to go to IPU, but family refused.

## 2024-10-09 NOTE — SPEECH THERAPY NOTE
Speech-Language Pathology Bedside Swallow Evaluation      Patient Name: Alyssa Madrigal    Today's Date: 10/9/2024     Problem List  Principal Problem:    Osteomyelitis (HCC)  Active Problems:    Controlled substance agreement broken    COPD (chronic obstructive pulmonary disease) with chronic bronchitis (HCC)    Major depressive disorder, recurrent episode with anxious distress (HCC)    IVDU (intravenous drug user)      Past Medical History  Past Medical History:   Diagnosis Date    Back pain     Drug use     Heroin abuse (HCC)     Neck pain        Past Surgical History  Past Surgical History:   Procedure Laterality Date    BACK SURGERY      fusion    FL GUIDED NEEDLE PLAC BX/ASP/INJ  7/23/2020    FL GUIDED NEEDLE PLAC BX/ASP/INJ  7/23/2020    FL GUIDED NEEDLE PLAC BX/ASP/INJ  11/3/2020    FL GUIDED NEEDLE PLAC BX/ASP/INJ  6/25/2021    HIP SURGERY Bilateral     NECK SURGERY      fusion       Summary   Pt presented with s/s suggestive of mild oral dysphagia characterized by min prolonged mastication time and breakdown of soft bolus. Otherwise, the patient tolerated puree solids and thin liquids well. Pharyngeal stage of swallowing appears adequate.     Risk/s for Aspiration: low     Recommended Diet: mechanically altered/level 2 diet and thin liquids   Recommended Form of Meds: whole with puree   Aspiration precautions and swallowing strategies: upright posture and small bites/sips  Other Recommendations: Continue frequent oral care    Current Medical Status  Chief Complaint: Les Madrigal is a 64 y.o. female with a past medical history of PMH includes chronic pain disorder, h/o substance use disorder (heroin) on methadone in the unit (checked PDMP) ,h/o chronic MRSA osteomyelitis of R femur leading to Girdlestone procedure (resection of the head and neck of the femur without hip replacement), COPD, HTN, B12 deficiency, bipolar disorder, h/o DVT, tobacco use.  cervical spine pain status post laminectomy,  with hardware infection and abscess diagnosed on 8/2024, completed doxycycline 10/7/2024 per notes from facility. Complex hardware removal recommended by neurosurgery. She declined surgery and elected to go home with hospice.   Home hospice was started on 9/8/2025. She required transfer to the IPU for uncontrolled pain, diversion of controlled substances (lorazepam), and tampering with controlled medication box. She had uptitration of meds in Hospice  Patient was brought in. Patient was found to disperse medication at her IPU, as a result was brought into the emergency room.. She was transferred to ED due to unsafe discharge.     Current Precautions:  Fall  Aspiration    Allergies:  No known food allergies  Past medical history:  Please see H&P for details    Social/Education/Vocational Hx:  Unsure of living arrangements     Swallow Information   Current Risks for Dysphagia & Aspiration:  limited dentition. Patient has some confusion as well   Current Symptoms/Concerns:  prolonged mastication   Current Diet: regular diet and thin liquids   Baseline Diet: regular diet and thin liquids    Baseline Assessment   Behavior/Cognition: alert, but confused  Speech/Language Status: able to participate in basic conversation and able to follow commands inconsistently  Patient Positioning: upright in bed  Pain Status/Interventions/Response to Interventions: No report of or nonverbal indications of pain.     Swallow Mechanism Exam  Facial: symmetrical  Labial: WFL  Lingual: WFL  Velum: unable to visualize  Mandible: adequate ROM  Dentition: upper dentures (lower is edentulous)  Vocal quality:clear/adequate   Respiratory Status: on RA       Consistencies Assessed and Performance   Consistencies Administered: thin liquids, puree, and soft solids  Materials administered included applesauce and adalberto cracker with thin liquids    Oral Stage: mild  Mastication was min prolonged, but efficient with the materials administered today.   Bolus formation and transfer were functional with no significant oral residue noted.  No overt s/s reduced oral control.    Pharyngeal Stage: WFL  Swallow Mechanics:  Swallowing initiation appeared prompt.  Laryngeal rise was assessed and judged to be within functional limits.  No coughing, throat clearing, change in vocal quality or respiratory status noted today.     Esophageal Concerns: none reported    Summary and Recommendations (see above)    Results Reviewed with: RN and MD     Treatment Recommended: dysphagia therapy      Frequency of treatment: 1-2 f/u sessions     Patient Stated Goal: none stated     Dysphagia LTG  -Patient will demonstrate safe and effective oral intake (without overt s/s significant oral/pharyngeal dysphagia including s/s penetration or aspiration) for the highest appropriate diet level.     Short Term Goals:  -Pt will tolerate Dysphagia 2/mechanical soft diet and thin liquid with no significant s/s oral or pharyngeal dysphagia across 1-3 diagnostic session/s.    -Patient will tolerate trials of upgraded food and/or liquid texture with no significant s/s of oral or pharyngeal dysphagia including aspiration across 1-3 diagnostic sessions     Speech Therapy Prognosis   Prognosis: fair    Prognosis Considerations: medical status and cognitive status

## 2024-10-09 NOTE — ASSESSMENT & PLAN NOTE
MRI in 7/2024   Infectious/inflammatory changes from the occiput to cervical spine as   discussed above. Likely improved fluid collection in the left posterior   paraspinal soft tissues at C1-2 level. Otherwise no significant change from   06/29/2023 including epidural soft tissue thickening and enhancement and fluid   collection along the right aspect of C1-C2 joint and prevertebral space.     Patient went to hospice and completed 3 months of doxycycline  Still complains of pain - palliative care following for assistance with pain management  Not meeting sepsis criteria - defer antibiotics for now  Pt was discharged for [facility] hospice services due to suspected diversion of narcotics.  Pt was sent to the ED at [facility] for an opinion regarding plan of care   Plan:   Not septic so will monitor off antibiotics at this time  Neurosurgery consultation appreciated, will discuss plan of care with the patient's sister prior to ordered any additional workup

## 2024-10-09 NOTE — ED NOTES
PT NEEDS ASSISTANCE TO EAT  PT CAN NOT HOLD CUP OR UTENSILS  PT ALSO NEEDS GROUND MEAT DUE TO TROUBLE CHEWING     Caterina Conrad, [facility]  10/09/24 3946

## 2024-10-09 NOTE — ASSESSMENT & PLAN NOTE
MRI in 7/2024  1.  Infectious/inflammatory changes from the occiput to cervical spine as   discussed above. Likely improved fluid collection in the left posterior   paraspinal soft tissues at C1-2 level. Otherwise no significant change from   06/29/2023 including epidural soft tissue thickening and enhancement and fluid   collection along the right aspect of C1-C2 joint and prevertebral space.   2.  Continued close follow-up is recommended.   Patient went to hospice and completed 3 months of doxycycline  Still complains of pain  Not meeting sepsis criteria  Brought in today due to patient does not want hospice and is willing to look at treatment options. In my assessment in the ED patient appear competent to make decisions. Oriented x3  Plan:  Not septic so will leave off AB  Neurosurgery consulted  Palliative for complex situation. Patient was revoked from hospice and sent here. Will need help with goals of care and pain management  Current regimentBupropion 150 mg once daily  Clonazepam 1 mg TID  Haloperidol 2m q 2 hours as needed. Will wean this down TID PRN since patient is no longer hospice  On ativan 1 mg every 2 hours at facility. Will decrease to TID PRN  Patient on methadone 5 mg BID for Drug hx. Will continue. Confirmed with PDMP  Oxycoddone 5 mg every 3 hrs as needed for pain. Will continue  Seoquel 150 mg once daily at night  Venlaflaxine 225 once daily  Completed doxycycline for 3 months on 10/07/2024 per facility med list.

## 2024-10-10 PROCEDURE — 99232 SBSQ HOSP IP/OBS MODERATE 35: CPT | Performed by: INTERNAL MEDICINE

## 2024-10-10 PROCEDURE — 92526 ORAL FUNCTION THERAPY: CPT

## 2024-10-10 PROCEDURE — 99233 SBSQ HOSP IP/OBS HIGH 50: CPT | Performed by: INTERNAL MEDICINE

## 2024-10-10 RX ORDER — NALOXONE HYDROCHLORIDE 1 MG/ML
2 INJECTION INTRAMUSCULAR; INTRAVENOUS; SUBCUTANEOUS DAILY PRN
Status: DISCONTINUED | OUTPATIENT
Start: 2024-10-10 | End: 2024-10-28 | Stop reason: HOSPADM

## 2024-10-10 RX ORDER — FENTANYL 25 UG/1
25 PATCH TRANSDERMAL
Status: DISCONTINUED | OUTPATIENT
Start: 2024-10-10 | End: 2024-10-17

## 2024-10-10 RX ADMIN — ACETAMINOPHEN 975 MG: 325 TABLET ORAL at 20:45

## 2024-10-10 RX ADMIN — LORAZEPAM 1 MG: 1 TABLET ORAL at 18:44

## 2024-10-10 RX ADMIN — FENTANYL 25 MCG: 25 PATCH TRANSDERMAL at 20:45

## 2024-10-10 RX ADMIN — METHADONE HYDROCHLORIDE 5 MG: 5 TABLET ORAL at 09:22

## 2024-10-10 RX ADMIN — CLONAZEPAM 1 MG: 1 TABLET ORAL at 17:42

## 2024-10-10 RX ADMIN — ACETAMINOPHEN 975 MG: 325 TABLET ORAL at 12:35

## 2024-10-10 RX ADMIN — QUETIAPINE 150 MG: 150 TABLET, FILM COATED, EXTENDED RELEASE ORAL at 22:45

## 2024-10-10 RX ADMIN — VENLAFAXINE HYDROCHLORIDE 225 MG: 150 CAPSULE, EXTENDED RELEASE ORAL at 09:21

## 2024-10-10 RX ADMIN — OXYCODONE HYDROCHLORIDE 10 MG: 10 TABLET ORAL at 23:45

## 2024-10-10 RX ADMIN — ENOXAPARIN SODIUM 40 MG: 40 INJECTION SUBCUTANEOUS at 09:22

## 2024-10-10 RX ADMIN — LORATADINE 10 MG: 10 TABLET ORAL at 09:22

## 2024-10-10 RX ADMIN — OXYCODONE HYDROCHLORIDE 10 MG: 10 TABLET ORAL at 17:41

## 2024-10-10 RX ADMIN — BUPROPION HYDROCHLORIDE 300 MG: 150 TABLET, EXTENDED RELEASE ORAL at 09:23

## 2024-10-10 NOTE — CASE MANAGEMENT
"   Case Management Progress Note    Patient name Alyssa Madrigal  Location [facility]-01 MRN [id number]  : 1960 Date 10/10/2024       LOS (days): 2  Geometric Mean LOS (GMLOS) (days): 4.2  Days to GMLOS:2.5        OBJECTIVE:        Current admission status: Inpatient  Preferred Pharmacy:   [facility] [address], [address] [place], [place]  [address] [facility] [address]  Phone: [phone] Fax: [phone]    [facility] [place] - [address]  [address]  [facility] [address]  Phone: [phone] Fax: [phone]    Primary Care Provider: Edenilson Thomas DO    Primary Insurance: [employer]  Secondary Insurance: [employer] [facility]    PROGRESS NOTE:    CIPRIANO called and spoke with [facility] Social Worker, Anjali [phone] regarding pt.   Pt was sent home on hospice from [facility] on 24.  Per Anjali, pt lives alone, but her sister Jaycee is/was in the process of moving in with pt.  Jaycee assists pt at home but does not provide consistent care.  Per Anjali, Jaycee is actively using illicit drugs and was allegedly accused of medication diversion by [facility] staff.  Anjali states that within 24 hours of pt being d/c home on hospice there were 8 Oxycodone and 4 Methadone pills missing.   Pt has an extensive hx with mental health and substance use.  Anjali confirms that pt does NOT have a POA.  Per Anjali, Jaycee revoked services after pt was moved to U.   Pt was then brought to [facility] due to Jaycee not wanting to work with [facility] any longer.      CIPRIANO called and spoke with pt's other sister, Kathleen [phone].  Per Kathleen, Jaycee is \"no longer in the picture\" and will not be making any decisions for the pt.  Kathleen states that pt is capable of making her own decisions and does not need someone to make them for her.  See note from RN Narcisa Noland RN on 10/10 regarding visitation restrictions.      CM called in " APS report due to medication diversion.  CIPRIANO filed report with Farnaz HACKETT and faxed Act 70 form to [facility] [phone].

## 2024-10-10 NOTE — SPEECH THERAPY NOTE
Speech Language/Pathology    Speech/Language Pathology Progress Note    Patient Name: Alyssa Madrigal  Today's Date: 10/10/2024     Problem List  Principal Problem:    Osteomyelitis (HCC)  Active Problems:    Controlled substance agreement broken    COPD (chronic obstructive pulmonary disease) with chronic bronchitis (HCC)    Major depressive disorder, recurrent episode with anxious distress (HCC)    IVDU (intravenous drug user)       Past Medical History  Past Medical History:   Diagnosis Date    Back pain     Drug use     Heroin abuse (HCC)     Neck pain         Past Surgical History  Past Surgical History:   Procedure Laterality Date    BACK SURGERY      fusion    FL GUIDED NEEDLE PLAC BX/ASP/INJ  7/23/2020    FL GUIDED NEEDLE PLAC BX/ASP/INJ  7/23/2020    FL GUIDED NEEDLE PLAC BX/ASP/INJ  11/3/2020    FL GUIDED NEEDLE PLAC BX/ASP/INJ  6/25/2021    HIP SURGERY Bilateral     NECK SURGERY      fusion         Subjective:  Patient awake and alert.     Objective:  The patient is seen at lunch meal for dysphagia therapy. She is on a mechanical soft diet, but is assessed with regular crab cakes. Visitor at bedside feeds patient. She has upper dentures in. Bite size and rate is adequate. Mastication time is min prolonged, but efficient. The patient takes sips of thin liquids via straw without overt s/s aspiration. Patient continues to request softer foods. She refused trial of regular carrots.     Assessment:  The patient tolerated soft solids well.     Plan/Recommendations:  Continue mechanical soft solids and thin liquids. Continue ST to further assess tolerance and trial upgrades if able.

## 2024-10-10 NOTE — OCCUPATIONAL THERAPY NOTE
Occupational Therapy Screen        Patient Name: Alyssa Madrigal  Today's Date: 10/10/2024         10/10/24 1519   OT Last Visit   OT Visit Date 10/10/24   Note Type   Note type Screen   Cancel Reasons Other       OT orders received. Chart reviewed. Pt pursuing hospice services, currently level 4 - comfort care. Will d/c OT orders at this time. Please re-consult if appropriate. Thank you!  Gretchen Donaldson MS, OTR/L

## 2024-10-10 NOTE — PROGRESS NOTES
[facility] Care Progress Note             10/10/24 1400   Clinical Encounter Type   Visited With Patient   Referral From Nurse   Taoist Encounters   Taoist Needs Prayer      met with pt after nurse's suggestion. Pt welcomed the visit and started presenting sad when she shared family history. Pt also shared regrets of past addiction.  denisse on pt's understanding of God, and reminded pt of her worth that come from being created in God's image and helped her to understand addiction as a human response to run from things that we believe are out of our control.  prayed with pt and will continue to do check-ins.

## 2024-10-10 NOTE — PLAN OF CARE
Problem: PAIN - ADULT  Goal: Verbalizes/displays adequate comfort level or baseline comfort level  Description: Interventions:  - Encourage patient to monitor pain and request assistance  - Assess pain using appropriate pain scale  - Administer analgesics based on type and severity of pain and evaluate response  - Implement non-pharmacological measures as appropriate and evaluate response  - Consider cultural and social influences on pain and pain management  - Notify physician/advanced practitioner if interventions unsuccessful or patient reports new pain  Outcome: Progressing     Problem: INFECTION - ADULT  Goal: Absence or prevention of progression during hospitalization  Description: INTERVENTIONS:  - Assess and monitor for signs and symptoms of infection  - Monitor lab/diagnostic results  - Monitor all insertion sites, i.e. indwelling lines, tubes, and drains  - Monitor endotracheal if appropriate and nasal secretions for changes in amount and color  - [place] appropriate cooling/warming therapies per order  - Administer medications as ordered  - Instruct and encourage patient and family to use good hand hygiene technique  - Identify and instruct in appropriate isolation precautions for identified infection/condition  Outcome: Progressing  Goal: Absence of fever/infection during neutropenic period  Description: INTERVENTIONS:  - Monitor WBC    Outcome: Progressing     Problem: SAFETY ADULT  Goal: Patient will remain free of falls  Description: INTERVENTIONS:  - Educate patient/family on patient safety including physical limitations  - Instruct patient to call for assistance with activity   - Consult OT/PT to assist with strengthening/mobility   - Keep Call bell within reach  - Keep bed low and locked with side rails adjusted as appropriate  - Keep care items and personal belongings within reach  - Initiate and maintain comfort rounds  - Make Fall Risk Sign visible to staff  - Apply yellow socks and bracelet  for high fall risk patients  - Consider moving patient to room near nurses station  Outcome: Progressing  Goal: Maintain or return to baseline ADL function  Description: INTERVENTIONS:  -  Assess patient's ability to carry out ADLs; assess patient's baseline for ADL function and identify physical deficits which impact ability to perform ADLs (bathing, care of mouth/teeth, toileting, grooming, dressing, etc.)  - Assess/evaluate cause of self-care deficits   - Assess range of motion  - Assess patient's mobility; develop plan if impaired  - Assess patient's need for assistive devices and provide as appropriate  - Encourage maximum independence but intervene and supervise when necessary  - Involve family in performance of ADLs  - Assess for home care needs following discharge   - Consider OT consult to assist with ADL evaluation and planning for discharge  - Provide patient education as appropriate  Outcome: Progressing  Goal: Maintains/Returns to pre admission functional level  Description: INTERVENTIONS:  - Perform AM-PAC 6 Click Basic Mobility/ Daily Activity assessment daily.  - Set and communicate daily mobility goal to care team and patient/family/caregiver.   - Out of bed for toileting  - Record patient progress and toleration of activity level   Outcome: Progressing
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
## 2024-10-10 NOTE — PHYSICAL THERAPY NOTE
Physical Therapy Cancellation Note       10/10/24 0855   PT Last Visit   PT Visit Date 10/10/24   Note Type   Note type Evaluation;Cancelled Session   Cancel Reasons Other   Additional Comments pending C discussions w/ pt/ family. Pt w/ unstable spine/ sx and HALO fixation recommended prior/ pt/ family refusing opting for hospice care in the past. PT will defer eval until following discussions as mobilization w/ therapy poses high risk for fall and cord injury.

## 2024-10-10 NOTE — CASE MANAGEMENT
Case Management Progress Note    Patient name Alyssa Madrigal  Location [facility] 807/[facility] 807-01 MRN [id number]  : 1960 Date 10/10/2024       LOS (days): 2  Geometric Mean LOS (GMLOS) (days): 4.2  Days to GMLOS:2.6        OBJECTIVE:        Current admission status: Inpatient  Preferred Pharmacy:   [facility] [address] [place], [address] [place], [place]  [address] [facility] [address]  Phone: [phone] Fax: [phone]    [place] [address]  [address]  [facility] [address]  Phone: [phone] Fax: [phone]    Primary Care Provider: Edenilson Thomas DO    Primary Insurance: [employer] REP  Secondary Insurance: [facility]    PROGRESS NOTE:    CM called and left  for pt's sister Jaycee Bang at [phone] requesting call back.  Pt was discharged from [facility] hospice services due to suspected diversion of narcotics.  CM following for d/c planning.    CM called [facility] and spoke with Sarah.  Sarah took this CM's information and will pass it on to their LCSW who was working with the pt and family.  CM to remain available.
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
## 2024-10-10 NOTE — TREATMENT PLAN
Per chart review, it appears the patient has elected to transition to comfort measures/hospice.  Therefore no further workup or intervention indicated from a neurosurgical standpoint.    Neurosurgery will sign off at this time.  Please reach out with any further questions or concerns.

## 2024-10-10 NOTE — CASE MANAGEMENT
Case Management Assessment & Discharge Planning Note    Patient name Alyssa Madrigal  Location [facility] 815/[facility] 815-01 MRN [id number]  : 1960 Date 10/10/2024       Current Admission Date: 10/8/2024  Current Admission Diagnosis:Osteomyelitis (HCC)   Patient Active Problem List    Diagnosis Date Noted Date Diagnosed    Aftercare following surgery of the musculoskeletal system 2024     Right hip pain 2024     Sacral decubitus ulcer (present on admission)  2019     history of Hepatitis C 2019     Left forearm abscess 2019     IVDU (intravenous drug user) 2019     Mechanical breakdown of internal orthopedic device ([facility]) 2019     Discitis of lumbar region 2017     Post laminectomy syndrome 10/06/2017     Osteomyelitis ([facility]) 10/05/2017     Controlled substance agreement broken 2017     B12 deficiency 10/31/2016     Cocaine abuse ([facility]) 10/17/2016     Prolonged Q-T interval on ECG 2016     Major depressive disorder, recurrent episode with anxious distress ([facility]) 2016     Depression 2015     Right foot drop 2015     Chronic back pain 2015     DDD (degenerative disc disease), lumbosacral 2013     Osteoarthrosis, unspecified whether generalized or localized, pelvic region and thigh 2013     COPD (chronic obstructive pulmonary disease) with chronic bronchitis ([facility]) 10/23/2012     Tobacco abuse 2011     Posttraumatic stress disorder 10/07/2011       LOS (days): 2  Geometric Mean LOS (GMLOS) (days): 4.2  Days to GMLOS:2.4     OBJECTIVE:    Risk of Unplanned Readmission Score: 8.47     Current admission status: Inpatient    Preferred Pharmacy:   [facility] [address], [address] [place], [place]  [address]  [facility] [address]  Phone: [phone] Fax: [phone]    [place], [place] - [address]  [address]  [facility] [address]  Phone: [phone] Fax:  [phone]    Primary Care Provider: Edenilson Thomas DO    Primary Insurance: [employer] REP  Secondary Insurance: [employer] [facility]    ASSESSMENT:  Active Health Care Proxies    There are no active Health Care Proxies on file.       Readmission Root Cause  30 Day Readmission: No    Patient Information  Admitted from:: Home  Mental Status: Alert  During Assessment patient was accompanied by: Not accompanied during assessment  Assessment information provided by:: Patient  Primary Caregiver: Private caregiver  Caregiver's Name:: [facility] [employer]  Caregiver's Relationship to Patient:: Other (Specify)  Support Systems: Self, Daughter  County of Residence: [place]  [facility]  Type of Current Residence: Apartment  Living Arrangements: Lives Alone  Is patient a ?: No    Activities of Daily Living Prior to Admission  Functional Status: Total dependent  Completes ADLs independently?: No  Level of ADL dependence: Total Dependent  Ambulates independently?: No  Level of ambulatory dependence: Total Dependent  Does patient use assisted devices?: Yes  Assisted Devices (DME) used: Hospital Bed  Does patient have a history of Outpatient Therapy (PT/OT)?: No  Does the patient have a history of Short-Term Rehab?: No  Does patient have a history of HHC?: No  Does patient currently have HHC?: No    Patient Information Continued  Does patient have prescription coverage?: Yes  Does patient receive dialysis treatments?: No  Does patient have a history of substance abuse?: Yes  Historical substance use preference: Cocaine, Heroin  Is patient currently in treatment for substance abuse?: N/A - sober  Does patient have a history of Mental Health Diagnosis?: Yes (depression)  Is patient receiving treatment for mental health?: Yes  Has patient received inpatient treatment related to mental health in the last 2 years?: No    Social Determinants of Health (SDOH)       Flowsheet Row Most Recent Value   Housing Stability    In the last 12 months, was there a time when you were not able to pay the mortgage or rent on time? N   In the past 12 months, how many times have you moved where you were living? 0   At any time in the past 12 months, were you homeless or living in a shelter (including now)? N   Transportation Needs    In the past 12 months, has lack of transportation kept you from medical appointments or from getting medications? no   In the past 12 months, has lack of transportation kept you from meetings, work, or from getting things needed for daily living? No   Food Insecurity    Within the past 12 months, you worried that your food would run out before you got the money to buy more. Never true   Within the past 12 months, the food you bought just didn't last and you didn't have money to get more. Never true   Utilities    In the past 12 months has the electric, gas, oil, or water company threatened to shut off services in your home? No          DISCHARGE DETAILS:    Discharge planning discussed with:: pt at bedside  [place] of Choice: Yes     CM contacted family/caregiver?: Yes  Were Treatment Team discharge recommendations reviewed with patient/caregiver?: Yes  Did patient/caregiver verbalize understanding of patient care needs?: Yes  Were patient/caregiver advised of the risks associated with not following Treatment Team discharge recommendations?: Yes    Contacts  Patient Contacts: Angelica Guy - sister  Relationship to Patient:: Family  Contact Method: Phone  Phone Number: [phone]  Reason/Outcome: Discharge Planning, Continuity of Care    Requested Home Health Care         Is the patient interested in HHC at discharge?: No    DME Referral Provided  Referral made for DME?: No    Other Referral/Resources/Interventions Provided:  Interventions: Hospice, SNF  Referral Comments: CM sent SNF referrals for hospice.  Pt in agreement to hospice referral.  Programs::  Palliative Care    Would you like to participate in our [facility] service program?  : No - Declined    Treatment Team Recommendation: Hospice  Discharge Destination Plan:: Hospice    Additional Comments: CM met with pt to introduce role of CM and gather information.  Pt alert and oriented and able to communicate with this CM.  Pt states she is interested in hospice at a SNF.  CM explained referral process to pt, pt agreeable.  CM sent blanket SNF referrals in [place] and will continue to follow.

## 2024-10-10 NOTE — PHYSICAL THERAPY NOTE
Physical Therapy Screen    Patient Name: Alyssa Madrigal    Today's Date: 10/10/2024     Problem List  Principal Problem:    Osteomyelitis (HCC)  Active Problems:    Controlled substance agreement broken    COPD (chronic obstructive pulmonary disease) with chronic bronchitis (HCC)    Major depressive disorder, recurrent episode with anxious distress (HCC)    IVDU (intravenous drug user)       Past Medical History  Past Medical History:   Diagnosis Date    Back pain     Drug use     Heroin abuse (HCC)     Neck pain         Past Surgical History  Past Surgical History:   Procedure Laterality Date    BACK SURGERY      fusion    FL GUIDED NEEDLE PLAC BX/ASP/INJ  7/23/2020    FL GUIDED NEEDLE PLAC BX/ASP/INJ  7/23/2020    FL GUIDED NEEDLE PLAC BX/ASP/INJ  11/3/2020    FL GUIDED NEEDLE PLAC BX/ASP/INJ  6/25/2021    HIP SURGERY Bilateral     NECK SURGERY      fusion      PT LEVEL 4 COMFORT CARE AND PURSUING HOSPICE CARE. WILL D/C FROM [facility].   Patrica Vasques

## 2024-10-11 ENCOUNTER — APPOINTMENT (INPATIENT)
Dept: RADIOLOGY | Facility: HOSPITAL | Age: 64
DRG: 540 | End: 2024-10-11
Payer: COMMERCIAL

## 2024-10-11 ENCOUNTER — HOME CARE VISIT (OUTPATIENT)
Dept: HOME HEALTH SERVICES | Facility: HOME HEALTHCARE | Age: 64
End: 2024-10-11

## 2024-10-11 PROCEDURE — 73030 X-RAY EXAM OF SHOULDER: CPT

## 2024-10-11 PROCEDURE — 99232 SBSQ HOSP IP/OBS MODERATE 35: CPT | Performed by: INTERNAL MEDICINE

## 2024-10-11 PROCEDURE — 73521 X-RAY EXAM HIPS BI 2 VIEWS: CPT

## 2024-10-11 PROCEDURE — 70450 CT HEAD/BRAIN W/O DYE: CPT

## 2024-10-11 PROCEDURE — 72125 CT NECK SPINE W/O DYE: CPT

## 2024-10-11 PROCEDURE — 99232 SBSQ HOSP IP/OBS MODERATE 35: CPT | Performed by: NURSE PRACTITIONER

## 2024-10-11 RX ORDER — CLONAZEPAM 1 MG/1
1 TABLET ORAL 3 TIMES DAILY
Status: DISCONTINUED | OUTPATIENT
Start: 2024-10-11 | End: 2024-10-28 | Stop reason: HOSPADM

## 2024-10-11 RX ORDER — LORAZEPAM 2 MG/ML
1 INJECTION INTRAMUSCULAR ONCE AS NEEDED
Status: COMPLETED | OUTPATIENT
Start: 2024-10-11 | End: 2024-10-11

## 2024-10-11 RX ORDER — OXYCODONE HYDROCHLORIDE 10 MG/1
10 TABLET ORAL
Status: DISCONTINUED | OUTPATIENT
Start: 2024-10-11 | End: 2024-10-24

## 2024-10-11 RX ORDER — HYDROMORPHONE HCL/PF 1 MG/ML
0.5 SYRINGE (ML) INJECTION
Status: DISCONTINUED | OUTPATIENT
Start: 2024-10-11 | End: 2024-10-23

## 2024-10-11 RX ORDER — LORAZEPAM 1 MG/1
1 TABLET ORAL EVERY 6 HOURS PRN
Status: DISCONTINUED | OUTPATIENT
Start: 2024-10-12 | End: 2024-10-14

## 2024-10-11 RX ORDER — LORAZEPAM 1 MG/1
1 TABLET ORAL EVERY 6 HOURS PRN
Status: DISCONTINUED | OUTPATIENT
Start: 2024-10-11 | End: 2024-10-11

## 2024-10-11 RX ADMIN — OXYCODONE HYDROCHLORIDE 10 MG: 10 TABLET ORAL at 20:52

## 2024-10-11 RX ADMIN — VENLAFAXINE HYDROCHLORIDE 225 MG: 150 CAPSULE, EXTENDED RELEASE ORAL at 11:50

## 2024-10-11 RX ADMIN — BUPROPION HYDROCHLORIDE 300 MG: 150 TABLET, EXTENDED RELEASE ORAL at 08:49

## 2024-10-11 RX ADMIN — OXYCODONE HYDROCHLORIDE 10 MG: 10 TABLET ORAL at 05:43

## 2024-10-11 RX ADMIN — ENOXAPARIN SODIUM 40 MG: 40 INJECTION SUBCUTANEOUS at 08:49

## 2024-10-11 RX ADMIN — HALOPERIDOL 1 MG: 1 TABLET ORAL at 00:04

## 2024-10-11 RX ADMIN — LORAZEPAM 1 MG: 2 INJECTION INTRAMUSCULAR; INTRAVENOUS at 21:15

## 2024-10-11 RX ADMIN — CLONAZEPAM 1 MG: 1 TABLET ORAL at 11:51

## 2024-10-11 RX ADMIN — CLONAZEPAM 1 MG: 1 TABLET ORAL at 20:53

## 2024-10-11 RX ADMIN — ACETAMINOPHEN 975 MG: 325 TABLET ORAL at 20:53

## 2024-10-11 RX ADMIN — ACETAMINOPHEN 975 MG: 325 TABLET ORAL at 05:38

## 2024-10-11 RX ADMIN — LORATADINE 10 MG: 10 TABLET ORAL at 08:49

## 2024-10-11 RX ADMIN — QUETIAPINE 150 MG: 150 TABLET, FILM COATED, EXTENDED RELEASE ORAL at 21:15

## 2024-10-11 RX ADMIN — LORAZEPAM 1 MG: 1 TABLET ORAL at 05:38

## 2024-10-11 NOTE — CHAPLAIN
responded to medical emergency code. Services were not needed at this time. Spiritual care remains available.

## 2024-10-11 NOTE — CASE MANAGEMENT
Case Management Progress Note    Patient name Alyssa Madrigal  Location [facility] 815/[facility] 815-01 MRN [id number]  : 1960 Date 10/11/2024       LOS (days): 3  Geometric Mean LOS (GMLOS) (days): 4.2  Days to GMLOS:1.7        OBJECTIVE:        Current admission status: Inpatient  Preferred Pharmacy:   [facility] [address] [place], [address] [place], [place]  [address] [facility] [address]  Phone: [phone] Fax: [phone]    [place] [address]  [address]  [facility] [address]  Phone: [phone] Fax: [phone]    Primary Care Provider: Edenilson Thomas DO    Primary Insurance: [employer]  Secondary Insurance: [facility]    PROGRESS NOTE:    CM received VM from pt's sister Jaycee [phone] requesting that this CM call her back to discuss the d/c plan.  Jaycee states in her VM that she would like the pt to return home.  CM will not return phone call due to pt requesting Jaycee be removed from care team.  SNF referrals sent for hospice.
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
## 2024-10-11 NOTE — RAPID RESPONSE
Rapid Response Note  Alyssa Madrigal 64 y.o. female MRN: [id number]  [facility] Encounter: [id number]    Rapid Response Notification(s):   Response called date/time:  10/11/2024 7:10 PM  Response team arrival date/time:  10/11/2024 7:10 PM  Response end date/time:  10/11/2024 7:21 PM  Level of care:  [facility]  Rapid response location:  [facility] unit  Primary reason for rapid response call:  Fall    Rapid Response Intervention(s):   Airway:  None  Breathing:  None  Circulation:  None  Fluids administered:  None  Medications administered:  None       Assessment:   Unwitnessed fall - patient found prone, admitted head strike and complaining of headache, neck pain, bilateral shoulder pain with passive ROM, and pelvic pain R>L     Plan:   CT head  CT c-spine  XR pelvis  XR b/l shoulders      Rapid Response Outcome:   Transfer:  Remain on floor  Code Status: Level 3 (DNAR and DNI)      Family notified: Primary team to notify Family Member       Background/Situation:   Alyssa Madrigal is a 64 y.o. female was an unwitnessed fall. Found several feet from bed in prone position.     Review of Systems   Respiratory:  Negative for shortness of breath.    Cardiovascular:  Negative for chest pain.   Gastrointestinal:  Negative for nausea.   Musculoskeletal:  Positive for arthralgias (neck, b/l shoulders, hips R>L) and neck pain. Negative for neck stiffness.   Neurological:  Positive for headaches. Negative for dizziness.       Objective:   Vitals:    10/11/24 0600 10/11/24 0650 10/11/24 1537 10/11/24 1915   BP:  105/73 132/90 128/94   BP Location:       Pulse:  86 95 105   Resp:  18 18 20   Temp:  98.2 °F (36.8 °C) 99.1 °F (37.3 °C)    TempSrc:       SpO2:  96% 96% 99%   Weight: 53.1 kg (117 lb 1 oz)      Height:         Physical Exam  Vitals reviewed.   Constitutional:       General: She is awake. She is not in acute distress.  HENT:      Head: Normocephalic and atraumatic.   Cardiovascular:      Rate and Rhythm: Normal  rate.      Pulses:           Radial pulses are 2+ on the right side and 2+ on the left side.   Pulmonary:      Effort: Pulmonary effort is normal.   Abdominal:      Palpations: Abdomen is soft.      Tenderness: There is no abdominal tenderness.   Genitourinary:     Comments: Alm  Musculoskeletal:      Right shoulder: Tenderness present. No bony tenderness.      Left shoulder: Tenderness present. No bony tenderness.      Cervical back: No spinous process tenderness. Decreased range of motion.      Thoracic back: Normal.      Lumbar back: Normal.      Right hip: Deformity, tenderness and bony tenderness present.      Left hip: Deformity and tenderness present.      Right ankle: Normal.      Left ankle: Normal.   Skin:     General: Skin is warm and dry.      Capillary Refill: Capillary refill takes less than 2 seconds.   Neurological:      General: No focal deficit present.      Mental Status: She is alert.      GCS: GCS eye subscore is 4. GCS verbal subscore is 5. GCS motor subscore is 6.      Comments: A/0 x 2-3   Psychiatric:         Behavior: Behavior is cooperative.

## 2024-10-11 NOTE — ASSESSMENT & PLAN NOTE
MRI in 7/2024   Infectious/inflammatory changes from the occiput to cervical spine as   discussed above. Likely improved fluid collection in the left posterior   paraspinal soft tissues at C1-2 level. Otherwise no significant change from   06/29/2023 including epidural soft tissue thickening and enhancement and fluid   collection along the right aspect of C1-C2 joint and prevertebral space.     Patient went to hospice and completed 3 months of doxycycline  Still complains of pain - palliative care following for assistance with pain management  Pt was discharged for [facility] hospice services due to suspected diversion of narcotics by her sister Jaycee.  Pt was sent to the ED at [facility] for an opinion regarding plan of care  Palliative care and hospice services are following  D/w palliative care service - will change methadone to fentanyl patch  Discharge planning  Pt reports adequate pain relief at this time  Seen by adult protective services today, recommending a decision making capacity evaluation.  Pt is requesting rehabilitation today, will make DN3 and obtain PT evaluation

## 2024-10-11 NOTE — CASE MANAGEMENT
Case Management Progress Note    Patient name Alyssa Madrigal  Location [facility] 815/[facility] 815-01 MRN [id number]  : 1960 Date 10/11/2024       LOS (days): 3  Geometric Mean LOS (GMLOS) (days): 4.2  Days to GMLOS:1.6          Pt's mom Constance present at bedside with pt's approval.

## 2024-10-11 NOTE — PROGRESS NOTES
Progress Note - Hospitalist   Name: Alyssa Madrigal 64 y.o. female I MRN: [id number]  Unit/Bed#: [facility] 815-01 I Date of Admission: 10/8/2024   Date of Service: 10/11/2024 I Hospital Day: 3    Assessment & Plan  Osteomyelitis (HCC)  MRI in 7/2024   Infectious/inflammatory changes from the occiput to cervical spine as   discussed above. Likely improved fluid collection in the left posterior   paraspinal soft tissues at C1-2 level. Otherwise no significant change from   06/29/2023 including epidural soft tissue thickening and enhancement and fluid   collection along the right aspect of C1-C2 joint and prevertebral space.     Patient went to hospice and completed 3 months of doxycycline  Still complains of pain - palliative care following for assistance with pain management  Pt was discharged for [facility] hospice services due to suspected diversion of narcotics by her sister Jaycee.  Pt was sent to the ED at [facility] for an opinion regarding plan of care  Palliative care and hospice services are following  D/w palliative care service - will change methadone to fentanyl patch  Discharge planning  Pt reports adequate pain relief at this time  Seen by adult protective services today, recommending a decision making capacity evaluation.  Pt is requesting rehabilitation today, will make DN3 and obtain PT evaluation  Controlled substance agreement broken  Plan noted above  COPD (chronic obstructive pulmonary disease) with chronic bronchitis (HCC)  Continue albuterol  Major depressive disorder, recurrent episode with anxious distress (HCC)  Continue venlaflaxine  IVDU (intravenous drug user)  Noted in history    VTE Pharmacologic Prophylaxis: VTE Score: 3 Moderate Risk (Score 3-4) - Pharmacological DVT Prophylaxis Ordered: heparin.    Mobility:   Basic Mobility Inpatient Raw Score: 12  -HLM Goal: 4: Move to chair/commode  JH-HLM Achieved: 3: Sit at edge of bed  JH-HLM Goal NOT achieved. Continue with multidisciplinary rounding and  encourage appropriate mobility to improve upon [facility] goals.    Patient Centered Rounds: I performed bedside rounds with nursing staff today.   Discussions with Specialists or Other Care Team Provider: nurse, CM, palliative care    Education and Discussions with Family / Patient:  N/A confidential encounter.     Current Length of Stay: 3 day(s)  Current Patient Status: Inpatient   Certification Statement: The patient will continue to require additional inpatient hospital stay due to discharge planning  Discharge Plan: Anticipate discharge in >72 hrs to rehab facility.    Code Status: Level 3 - DNAR and DNI    Subjective   Reports that her pain is better controlled today    Objective :  Temp:  [98.2 °F (36.8 °C)] 98.2 °F (36.8 °C)  HR:  [84-86] 86  BP: (105)/(73) 105/73  Resp:  [14-18] 18  SpO2:  [96 %] 96 %  O2 Device: None (Room air)    Body mass index is 20.09 kg/m².     Input and Output Summary (last 24 hours):     Intake/Output Summary (Last 24 hours) at 10/11/2024 1536  Last data filed at 10/11/2024 1300  Gross per 24 hour   Intake 702 ml   Output 1200 ml   Net -498 ml       Physical Exam  Constitutional:       Appearance: Normal appearance.   HENT:      Head: Normocephalic and atraumatic.      Nose: Nose normal.   Eyes:      Extraocular Movements: Extraocular movements intact.   Cardiovascular:      Rate and Rhythm: Normal rate and regular rhythm.   Pulmonary:      Effort: Pulmonary effort is normal.   Neurological:      Mental Status: She is alert. Mental status is at baseline.   Psychiatric:         Mood and Affect: Mood normal.         Behavior: Behavior normal.           Lines/Drains:  Lines/Drains/Airways       Active Status       Name Placement date Placement time Site Days    Urethral Catheter 10/08/24  --  --  3                  Urinary Catheter:  Goal for removal:  end of life care                 Lab Results: I have reviewed the following results:   Results from last 7 days   Lab Units 10/09/24  2938  10/08/24  2053   WBC Thousand/uL  --  10.62*   HEMOGLOBIN g/dL  --  13.7   HEMATOCRIT %  --  45.4   PLATELETS Thousands/uL 365 362   SEGS PCT %  --  61   LYMPHO PCT %  --  23   MONO PCT %  --  9   EOS PCT %  --  5     Results from last 7 days   Lab Units 10/09/24  0736   SODIUM mmol/L 138   POTASSIUM mmol/L 3.3*   CHLORIDE mmol/L 104   CO2 mmol/L 25   BUN mg/dL 23   CREATININE mg/dL 0.81   ANION GAP mmol/L 9   CALCIUM mg/dL 8.8   ALBUMIN g/dL 3.1*   TOTAL BILIRUBIN mg/dL 0.44   ALK PHOS U/L 123*   ALT U/L 12   AST U/L 13   GLUCOSE RANDOM mg/dL 81         Results from last 7 days   Lab Units 10/08/24  2034 10/08/24  1947   POC GLUCOSE mg/dl 69 60*         Results from last 7 days   Lab Units 10/09/24  0000   LACTIC ACID mmol/L 0.8       Recent Cultures (last 7 days):   Results from last 7 days   Lab Units 10/09/24  0000 10/08/24  2053   BLOOD CULTURE  No Growth at 48 hrs. No Growth at 48 hrs.       Imaging Results Review: No pertinent imaging studies reviewed.  Other Study Results Review: No additional pertinent studies reviewed.    Last 24 Hours Medication List:     Current Facility-Administered Medications:     acetaminophen (TYLENOL) tablet 975 mg, Q8H    albuterol (PROVENTIL HFA,VENTOLIN HFA) inhaler 2 puff, Q4H PRN    buPROPion (WELLBUTRIN XL) 24 hr tablet 300 mg, Daily    clonazePAM (KlonoPIN) tablet 1 mg, TID    enoxaparin (LOVENOX) subcutaneous injection 40 mg, Daily    fentaNYL (DURAGESIC) 25 mcg/hr TD 72 hr patch 25 mcg, Q72H    haloperidol (HALDOL) tablet 1 mg, BID PRN    HYDROmorphone (DILAUDID) injection 0.5 mg, Q3H PRN    loratadine (CLARITIN) tablet 10 mg, Daily    LORazepam (ATIVAN) tablet 1 mg, Q6H PRN    naloxone (NARCAN) intranasal 2 mg, Daily PRN    ondansetron (ZOFRAN) injection 4 mg, Q6H PRN    oxyCODONE (ROXICODONE) immediate release tablet 10 mg, Q3H PRN    oxyCODONE (ROXICODONE) IR tablet 5 mg, Q3H PRN    QUEtiapine (SEROquel XR) 24 hr tablet 150 mg, HS    venlafaxine (EFFEXOR-XR) 24 hr  capsule 225 mg, Daily    Administrative Statements   Today, Patient Was Seen By: Victorino Thorne MD  I have spent a total time of 40 minutes in caring for this patient on the day of the visit/encounter including Counseling / Coordination of care, Documenting in the medical record, Reviewing / ordering tests, medicine, procedures  , Obtaining or reviewing history  , and Communicating with other healthcare professionals .    **Please Note: This note may have been constructed using a voice recognition system.**

## 2024-10-11 NOTE — PROGRESS NOTES
Inpatient Consultation - Palliative and Supportive Care   Alyssa Madrigal 64 y.o. female [id number]    Patient Active Problem List   Diagnosis    B12 deficiency    Chronic back pain    Cocaine abuse (HCC)    Controlled substance agreement broken    COPD (chronic obstructive pulmonary disease) with chronic bronchitis (HCC)    DDD (degenerative disc disease), lumbosacral    Discitis of lumbar region    Depression    Major depressive disorder, recurrent episode with anxious distress (HCC)    Mechanical breakdown of internal orthopedic device (HCC)    Osteoarthrosis, unspecified whether generalized or localized, pelvic region and thigh    Osteomyelitis ([facility])    Post laminectomy syndrome    Posttraumatic stress disorder    Prolonged Q-T interval on ECG    Right foot drop    Tobacco abuse    Left forearm abscess    IVDU (intravenous drug user)    history of Hepatitis C    Sacral decubitus ulcer (present on admission)     Aftercare following surgery of the musculoskeletal system    Right hip pain         Plan:  1. Symptom management   Patients pain is relatively well controlled on methadone and prn oxycodone/dilaudid however methadone will not be able to administered at nursing home. We will transition patient to fentanyl patch instead of methadone.    Recommend current regimen for pain:   Tylenol 975 every 8hr scheduled  Fentanyl 25mcg patch q72hr starting tonight  Oxycodone 5mg every 3hr as needed, 10 mg every 6hr as needed  Dilaudid 0.5 every 6hr as needed for breakthrough  Narcan PRN ordered if needed    Continue Klonopin 1 mg 3 times daily PRN, Haldol 1 mg twice daily PRN, Ativan 1 mg every 8 PRN    2. Goals - Hospice care with inpatient hospice vs nursing home with hospice.      Code Status:  Level 4 comfort care   Decisional apparatus:  Unclear if competent as patient us not able to talk due to crying    Advance Directive / Living Will / POLST:  At this point patient does not have active POA or family that will make  decisions if incapacitated.         Subjective  Patient feeling better today and states her pain is better controlled with the current regimen. She is able to tell me why she is in the hospital and what happened at [facility]. She states she does believe ruben was taking her medications and does not want ruben to be involved in her care at this time. She does want to continue with hospice care and does not want to undergo surgery. She would like to pursue hospice house or nursing home with hospice. She states she would like for angelica to be her point of contact and her power of .   Angelica called today and states she does not want to be involved in the patients care and does not want to be power of  or health care proxy. She would like for potentially another person or a guardian appointed by the state if patient does not have capacity.       Past Medical History:   Diagnosis Date    Back pain     Drug use     Heroin abuse (HCC)     Neck pain      Past Surgical History:   Procedure Laterality Date    BACK SURGERY      fusion    FL GUIDED NEEDLE PLAC BX/ASP/INJ  2020    FL GUIDED NEEDLE PLAC BX/ASP/INJ  2020    FL GUIDED NEEDLE PLAC BX/ASP/INJ  11/3/2020    FL GUIDED NEEDLE PLAC BX/ASP/INJ  2021    HIP SURGERY Bilateral     NECK SURGERY      fusion     Social History     Socioeconomic History    Marital status:      Spouse name: Not on file    Number of children: Not on file    Years of education: Not on file    Highest education level: Not on file   Occupational History    Not on file   Tobacco Use    Smoking status: Former     Current packs/day: 0.00     Types: Cigarettes     Quit date: 2022     Years since quittin.1    Smokeless tobacco: Never   Vaping Use    Vaping status: Never Used   Substance and Sexual Activity    Alcohol use: Not Currently    Drug use: Not Currently     Types: Heroin, Marijuana     Comment: heroin: 19  marijuana: 19     "Sexual activity: Not on file   Other Topics Concern    Not on file   Social History Narrative    Not on file     Social Determinants of Health     Financial Resource Strain: Low Risk  (8/26/2024)    Received from [facility]    Overall Financial Resource Strain (CARDIA)     Difficulty of Paying Living Expenses: Not hard at all   Food Insecurity: No Food Insecurity (10/10/2024)    Hunger Vital Sign     Worried About Running Out of Food in the Last Year: Never true     Ran Out of Food in the Last Year: Never true   Transportation Needs: No Transportation Needs (10/10/2024)    PRAPARE - Transportation     Lack of Transportation (Medical): No     Lack of Transportation (Non-Medical): No   Physical Activity: Not on file   Stress: Not on file   Social Connections: Feeling Socially Integrated (4/12/2023)    [facility]    OASIS : Social Isolation     How often do you feel lonely or isolated from those around you?: Never   Intimate Partner Violence: Not At Risk (8/26/2024)    Received from [facility]    Humiliation, Afraid, Rape, and Kick questionnaire     Fear of Current or Ex-Partner: No     Emotionally Abused: No     Physically Abused: No     Sexually Abused: No   Housing Stability: Low Risk  (10/10/2024)    Housing Stability Vital Sign     Unable to Pay for Housing in the Last Year: No     Number of Times Moved in the Last Year: 0     Homeless in the Last Year: No     No family history on file.    MEDICATIONS / ALLERGIES:    all current active meds have been reviewed    Allergies   Allergen Reactions    Other      PT \"There is some other antibiotic that I took,its a really long name. I dont remember the name\"     Pollen Extract Other (See Comments)     Sinus headache.    Sulfa Antibiotics      Unknown reaction      Cefazolin Hives and Rash       OBJECTIVE:    Physical Exam  Physical Exam  Vitals and nursing note " reviewed.   Constitutional:       General: She is not in acute distress.     Appearance: She is well-developed.   HENT:      Head: Normocephalic and atraumatic.   Eyes:      Conjunctiva/sclera: Conjunctivae normal.   Cardiovascular:      Rate and Rhythm: Normal rate and regular rhythm.      Heart sounds: No murmur heard.  Pulmonary:      Effort: Pulmonary effort is normal. No respiratory distress.      Breath sounds: Normal breath sounds.   Abdominal:      Palpations: Abdomen is soft.      Tenderness: There is no abdominal tenderness.   Musculoskeletal:         General: No swelling.      Cervical back: Neck supple.   Skin:     General: Skin is warm and dry.      Capillary Refill: Capillary refill takes less than 2 seconds.   Neurological:      Mental Status: She is alert.   Psychiatric:         Mood and Affect: Mood normal.         Lab Results: I have personally reviewed pertinent labs.    I have spent a total time of 30 minutes in caring for this patient on the day of the visit/encounter including Diagnostic results, Risks and benefits of tx options, and Patient and family education.         I have reviewed the patient's controlled substance dispensing history in the Prescription Drug Monitoring Program in compliance with the [facility] regulations before prescribing any controlled substances.         We appreciate the invitation to be involved in this patient's care. Please do not hesitate to reach our on call provider through our clinic answering service at [phone] should you have acute symptom control concerns.    Nona Guajardo MD  Palliative and Supportive Care  Clinic/Answering Service: [phone]  You can find me on [employer] Secure Chat!

## 2024-10-11 NOTE — HOSPICE NOTE
Received hospice referral. I met with pt, hospice benefits reviewed per Medicare guidelines and all questions answered. Pt told me she does not want hospice, she wants therapy and be able to return home to her apartment. I did leave my number with her if she has any additional questions. CIPRIANO Parker updated.

## 2024-10-11 NOTE — QUICK NOTE
Rapid response activated for unwitnessed fall.  Discussed with critical care team.  X-ray imaging and CT head neck have been ordered      Called updated Sister Angelica Real

## 2024-10-11 NOTE — CASE MANAGEMENT
Case Management Discharge Planning Note    Patient name Alyssa Madrigal  Location [facility] 815/[facility] 815-01 MRN [id number]  : 1960 Date 10/11/2024       Current Admission Date: 10/8/2024  Current Admission Diagnosis:Osteomyelitis (HCC)   Patient Active Problem List    Diagnosis Date Noted Date Diagnosed    Aftercare following surgery of the musculoskeletal system 2024     Right hip pain 2024     Sacral decubitus ulcer (present on admission)  2019     history of Hepatitis C 2019     Left forearm abscess 2019     IVDU (intravenous drug user) 2019     Mechanical breakdown of internal orthopedic device (HCC) 2019     Discitis of lumbar region 2017     Post laminectomy syndrome 10/06/2017     Osteomyelitis ([facility]) 10/05/2017     Controlled substance agreement broken 2017     B12 deficiency 10/31/2016     Cocaine abuse ([facility]) 10/17/2016     Prolonged Q-T interval on ECG 2016     Major depressive disorder, recurrent episode with anxious distress ([facility]) 2016     Depression 2015     Right foot drop 2015     Chronic back pain 2015     DDD (degenerative disc disease), lumbosacral 2013     Osteoarthrosis, unspecified whether generalized or localized, pelvic region and thigh 2013     COPD (chronic obstructive pulmonary disease) with chronic bronchitis ([facility]) 10/23/2012     Tobacco abuse 2011     Posttraumatic stress disorder 10/07/2011       LOS (days): 3  Geometric Mean LOS (GMLOS) (days): 4.2  Days to GMLOS:1.5     OBJECTIVE:  Risk of Unplanned Readmission Score: 8.71         Current admission status: Inpatient   Preferred Pharmacy:   [facility] [address], [address] [place], [place]  [address] [facility] [address]  Phone: [phone] Fax: [phone]    [place], [place] - [address]  [address]  [facility] [address]  Phone: [phone] Fax: [phone]    [facility]  "Care Provider: Edenilson Thomas DO    Primary Insurance: YUMIKO SALDANA  Secondary Insurance: [facility]    DISCHARGE DETAILS:    Discharge planning discussed with:: pt at bedside, Kristyn from [facility]    Additional Comments: CM met with pt and Kristyn from [facility].  Pt expressed that she wants to \"get better and does not want to feel this way any longer\".  Pt states that she wants to go to rehab to get stronger and return to her \"normal\".  CM and Kristyn provided support to pt and reassured her that she is under our care here at [facility].  CM spoke with pt's mother regarding pt's wishes, and pt's mom states that she wants to go to rehab.  CM spoke with Dr Thorne and Faviola Eldridge hospice RN regarding pt's wishes.  Pt will be switched to level 3 so that PT/OT can see her.  Pt expressed to CM that she does not wish to pursue hospice any longer.  Pending PT/OT recs.    "

## 2024-10-11 NOTE — TREATMENT PLAN
10/11/2024 11:20 AM -  Per extensive discussion and consideration yesterday by our rotating teammate Dr. White and attending Dr. Fountain, this pt has agreed to comfort cares, with liberal access to symptom meds for terminal illness and intractable symptoms.      Therefore, we have scheduled her clonazepam, liberalized her access to enteral and IV opioids for pain, and liberalized access to breakthru BZD for panic/anxiety.      Again, we must reiterate that -- because of safety and diversion concerns -- the pt will not be allowed medications from our team outside a structured care setting.  Will await further planning from  re: placement in such a facility for pt and community safety.      We will continue to follow during inpatient stay.    Edenilson Joshi MD  Palliative and Supportive Care  Clinic/Answering Service: [phone]  You can find me on [employer] Secure Chat!

## 2024-10-11 NOTE — PLAN OF CARE
Problem: PAIN - ADULT  Goal: Verbalizes/displays adequate comfort level or baseline comfort level  Description: Interventions:  - Encourage patient to monitor pain and request assistance  - Assess pain using appropriate pain scale  - Administer analgesics based on type and severity of pain and evaluate response  - Implement non-pharmacological measures as appropriate and evaluate response  - Consider cultural and social influences on pain and pain management  - Notify physician/advanced practitioner if interventions unsuccessful or patient reports new pain  Outcome: Progressing     Problem: INFECTION - ADULT  Goal: Absence or prevention of progression during hospitalization  Description: INTERVENTIONS:  - Assess and monitor for signs and symptoms of infection  - Monitor lab/diagnostic results  - Monitor all insertion sites, i.e. indwelling lines, tubes, and drains  - Monitor endotracheal if appropriate and nasal secretions for changes in amount and color  - [place] appropriate cooling/warming therapies per order  - Administer medications as ordered  - Instruct and encourage patient and family to use good hand hygiene technique  - Identify and instruct in appropriate isolation precautions for identified infection/condition  Outcome: Progressing  Goal: Absence of fever/infection during neutropenic period  Description: INTERVENTIONS:  - Monitor WBC    Outcome: Progressing

## 2024-10-11 NOTE — PROGRESS NOTES
Pastoral Care Progress Note             10/11/24 1400   Clinical Encounter Type   Visited With Patient   Routine Visit Follow-up   Quaker Encounters   Quaker Needs Prayer      follow-up with pt found pt in need of position adjustment.  called staff to help. Pt welcomed prayer but stated that she did not need anything more.  will continue to check in on pt and remains available.
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
## 2024-10-12 PROCEDURE — 99232 SBSQ HOSP IP/OBS MODERATE 35: CPT | Performed by: INTERNAL MEDICINE

## 2024-10-12 RX ORDER — NYSTATIN 100000 U/G
CREAM TOPICAL 2 TIMES DAILY
Status: DISCONTINUED | OUTPATIENT
Start: 2024-10-12 | End: 2024-10-28 | Stop reason: HOSPADM

## 2024-10-12 RX ORDER — LORAZEPAM 2 MG/ML
1 INJECTION INTRAMUSCULAR ONCE AS NEEDED
Status: COMPLETED | OUTPATIENT
Start: 2024-10-12 | End: 2024-10-12

## 2024-10-12 RX ORDER — METHOCARBAMOL 750 MG/1
750 TABLET, FILM COATED ORAL EVERY 8 HOURS SCHEDULED
Status: DISCONTINUED | OUTPATIENT
Start: 2024-10-12 | End: 2024-10-14

## 2024-10-12 RX ADMIN — LORAZEPAM 1 MG: 2 INJECTION INTRAMUSCULAR; INTRAVENOUS at 02:33

## 2024-10-12 RX ADMIN — LORATADINE 10 MG: 10 TABLET ORAL at 09:18

## 2024-10-12 RX ADMIN — HYDROMORPHONE HYDROCHLORIDE 0.5 MG: 1 INJECTION, SOLUTION INTRAMUSCULAR; INTRAVENOUS; SUBCUTANEOUS at 12:29

## 2024-10-12 RX ADMIN — OXYCODONE HYDROCHLORIDE 10 MG: 10 TABLET ORAL at 19:38

## 2024-10-12 RX ADMIN — CLONAZEPAM 1 MG: 1 TABLET ORAL at 16:16

## 2024-10-12 RX ADMIN — LORAZEPAM 1 MG: 1 TABLET ORAL at 10:18

## 2024-10-12 RX ADMIN — HALOPERIDOL 1 MG: 1 TABLET ORAL at 14:53

## 2024-10-12 RX ADMIN — BUPROPION HYDROCHLORIDE 300 MG: 150 TABLET, EXTENDED RELEASE ORAL at 09:18

## 2024-10-12 RX ADMIN — ACETAMINOPHEN 975 MG: 325 TABLET ORAL at 19:45

## 2024-10-12 RX ADMIN — HYDROMORPHONE HYDROCHLORIDE 0.5 MG: 1 INJECTION, SOLUTION INTRAMUSCULAR; INTRAVENOUS; SUBCUTANEOUS at 09:17

## 2024-10-12 RX ADMIN — HYDROMORPHONE HYDROCHLORIDE 0.5 MG: 1 INJECTION, SOLUTION INTRAMUSCULAR; INTRAVENOUS; SUBCUTANEOUS at 02:33

## 2024-10-12 RX ADMIN — OXYCODONE HYDROCHLORIDE 10 MG: 10 TABLET ORAL at 06:23

## 2024-10-12 RX ADMIN — CLONAZEPAM 1 MG: 1 TABLET ORAL at 09:18

## 2024-10-12 RX ADMIN — METHOCARBAMOL 750 MG: 750 TABLET ORAL at 21:01

## 2024-10-12 RX ADMIN — ENOXAPARIN SODIUM 40 MG: 40 INJECTION SUBCUTANEOUS at 09:18

## 2024-10-12 RX ADMIN — VENLAFAXINE HYDROCHLORIDE 225 MG: 150 CAPSULE, EXTENDED RELEASE ORAL at 09:18

## 2024-10-12 RX ADMIN — QUETIAPINE 150 MG: 150 TABLET, FILM COATED, EXTENDED RELEASE ORAL at 21:01

## 2024-10-12 RX ADMIN — OXYCODONE HYDROCHLORIDE 10 MG: 10 TABLET ORAL at 01:23

## 2024-10-12 RX ADMIN — NYSTATIN: 100000 CREAM TOPICAL at 17:22

## 2024-10-12 RX ADMIN — METHOCARBAMOL 750 MG: 750 TABLET ORAL at 13:41

## 2024-10-12 RX ADMIN — HALOPERIDOL 1 MG: 1 TABLET ORAL at 01:51

## 2024-10-12 RX ADMIN — CLONAZEPAM 1 MG: 1 TABLET ORAL at 20:51

## 2024-10-12 RX ADMIN — ACETAMINOPHEN 975 MG: 325 TABLET ORAL at 12:28

## 2024-10-12 NOTE — PHYSICAL THERAPY NOTE
Physical Therapy Cancellation Note     10/12/24 1146   PT Last Visit   PT Visit Date 10/12/24   Note Type   Note type Cancelled Session   Cancel Reasons Other     PT order received, chart review completed. Pt with updated care goals now Level 3, with pt requesting to be seen by PT. Per prior notes, pt needs NS x re consult/intervention if not on comfort care. PT will hold off until further recommendations/plan .  Monik Lantigua PT DPT

## 2024-10-12 NOTE — PLAN OF CARE
Problem: PAIN - ADULT  Goal: Verbalizes/displays adequate comfort level or baseline comfort level  Description: Interventions:  - Encourage patient to monitor pain and request assistance  - Assess pain using appropriate pain scale  - Administer analgesics based on type and severity of pain and evaluate response  - Implement non-pharmacological measures as appropriate and evaluate response  - Consider cultural and social influences on pain and pain management  - Notify physician/advanced practitioner if interventions unsuccessful or patient reports new pain  Outcome: Progressing     Problem: INFECTION - ADULT  Goal: Absence or prevention of progression during hospitalization  Description: INTERVENTIONS:  - Assess and monitor for signs and symptoms of infection  - Monitor lab/diagnostic results  - Monitor all insertion sites, i.e. indwelling lines, tubes, and drains  - Monitor endotracheal if appropriate and nasal secretions for changes in amount and color  - [place] appropriate cooling/warming therapies per order  - Administer medications as ordered  - Instruct and encourage patient and family to use good hand hygiene technique  - Identify and instruct in appropriate isolation precautions for identified infection/condition  Outcome: Progressing     Problem: SAFETY ADULT  Goal: Maintain or return to baseline ADL function  Description: INTERVENTIONS:  -  Assess patient's ability to carry out ADLs; assess patient's baseline for ADL function and identify physical deficits which impact ability to perform ADLs (bathing, care of mouth/teeth, toileting, grooming, dressing, etc.)  - Assess/evaluate cause of self-care deficits   - Assess range of motion  - Assess patient's mobility; develop plan if impaired  - Assess patient's need for assistive devices and provide as appropriate  - Encourage maximum independence but intervene and supervise when necessary  - Involve family in performance of ADLs  - Assess for home care  needs following discharge   - Consider OT consult to assist with ADL evaluation and planning for discharge  - Provide patient education as appropriate  Outcome: Progressing

## 2024-10-12 NOTE — PLAN OF CARE
Problem: INFECTION - ADULT  Goal: Absence or prevention of progression during hospitalization  Description: INTERVENTIONS:  - Assess and monitor for signs and symptoms of infection  - Monitor lab/diagnostic results  - Monitor all insertion sites, i.e. indwelling lines, tubes, and drains  - Monitor endotracheal if appropriate and nasal secretions for changes in amount and color  - [place] appropriate cooling/warming therapies per order  - Administer medications as ordered  - Instruct and encourage patient and family to use good hand hygiene technique  - Identify and instruct in appropriate isolation precautions for identified infection/condition  Outcome: Progressing     Problem: SAFETY ADULT  Goal: Patient will remain free of falls  Description: INTERVENTIONS:  - Educate patient/family on patient safety including physical limitations  - Instruct patient to call for assistance with activity   - Consult OT/PT to assist with strengthening/mobility   - Keep Call bell within reach  - Keep bed low and locked with side rails adjusted as appropriate  - Keep care items and personal belongings within reach  - Initiate and maintain comfort rounds  - Make Fall Risk Sign visible to staff  - Apply yellow socks and bracelet for high fall risk patients  - Consider moving patient to room near nurses station  Outcome: Progressing     Problem: SAFETY ADULT  Goal: Maintain or return to baseline ADL function  Description: INTERVENTIONS:  -  Assess patient's ability to carry out ADLs; assess patient's baseline for ADL function and identify physical deficits which impact ability to perform ADLs (bathing, care of mouth/teeth, toileting, grooming, dressing, etc.)  - Assess/evaluate cause of self-care deficits   - Assess range of motion  - Assess patient's mobility; develop plan if impaired  - Assess patient's need for assistive devices and provide as appropriate  - Encourage maximum independence but intervene and supervise when  necessary  - Involve family in performance of ADLs  - Assess for home care needs following discharge   - Consider OT consult to assist with ADL evaluation and planning for discharge  - Provide patient education as appropriate  Outcome: Progressing     Problem: SAFETY ADULT  Goal: Maintains/Returns to pre admission functional level  Description: INTERVENTIONS:  - Perform AM-PAC 6 Click Basic Mobility/ Daily Activity assessment daily.  - Set and communicate daily mobility goal to care team and patient/family/caregiver.   - Collaborate with rehabilitation services on mobility goals if consulted  - Out of bed for toileting  - Record patient progress and toleration of activity level   Outcome: Progressing

## 2024-10-12 NOTE — ASSESSMENT & PLAN NOTE
Had a fall out of bed last night on 10/11. CT scans and Xrs are without any new traumatic injury  Fall precautions  Continue analgesics

## 2024-10-12 NOTE — OCCUPATIONAL THERAPY NOTE
Occupational Therapy screen        Patient Name: Alyssa Madrigal  Today's Date: 10/12/2024       10/12/24 1107   OT Last Visit   OT Visit Date 10/12/24   Note Type   Note type Evaluation   Cancel Reasons Other   Additional Comments Pt was transitioned to level 3 code status w wish to see OT. However, per chart review, pt will need neurosx intervention if she does not pursue hospice care. Will await neurosx reconsult and possible capacity evaluation before proceeding with OT eval. Thank you.         BRALDY Montanez, OTR/L

## 2024-10-12 NOTE — NURSING NOTE
Patient with all 4 bed rails up. States she feels safe with the rails up.  Floor mats added to both sides of the bed.  Bed alarm on middle sensitivity alarm.

## 2024-10-12 NOTE — PROGRESS NOTES
Progress Note - Hospitalist   Name: Alyssa Madrigal 64 y.o. female I MRN: [id number]  [facility] Date of Admission: 10/8/2024   Date of Service: 10/12/2024 I Hospital Day: 4    Assessment & Plan  Osteomyelitis (HCC)  MRI in 7/2024   Infectious/inflammatory changes from the occiput to cervical spine as   discussed above. Likely improved fluid collection in the left posterior   paraspinal soft tissues at C1-2 level. Otherwise no significant change from   06/29/2023 including epidural soft tissue thickening and enhancement and fluid   collection along the right aspect of C1-C2 joint and prevertebral space.     Patient went to hospice and completed 3 months of doxycycline  Still complains of pain - palliative care following for assistance with pain management  Pt was discharged for [facility] hospice services due to suspected diversion of narcotics by her sister Jaycee.  Pt was sent to the ED at [facility] for an opinion regarding plan of care  Palliative care and hospice services are following  D/w palliative care service - will change methadone to fentanyl patch  Discharge planning  Pt reports adequate pain relief at this time  Seen by adult protective services today, recommending a decision making capacity evaluation.  Pt is requesting rehabilitation today, will make DN3 and obtain PT evaluation  Controlled substance agreement broken  Plan noted above  COPD (chronic obstructive pulmonary disease) with chronic bronchitis (HCC)  Continue albuterol  Major depressive disorder, recurrent episode with anxious distress (HCC)  Continue venlaflaxine  IVDU (intravenous drug user)  Noted in history  Fall  Had a fall out of bed last night on 10/11. CT scans and Xrs are without any new traumatic injury  Fall precautions  Continue analgesics    VTE Pharmacologic Prophylaxis: VTE Score: 3 Moderate Risk (Score 3-4) - Pharmacological DVT Prophylaxis Ordered: heparin.    Mobility:   Basic Mobility Inpatient Raw Score: 12  JH-AVELM  Goal: 4: Move to chair/commode  JH-HLM Achieved: 3: Sit at edge of bed  JH-HLM Goal achieved. Continue to encourage appropriate mobility.    Patient Centered Rounds: I performed bedside rounds with nursing staff today.   Discussions with Specialists or Other Care Team Provider: nurse, CM      Current Length of Stay: 4 day(s)  Current Patient Status: Inpatient   Certification Statement: The patient will continue to require additional inpatient hospital stay due to discharge planning, awaiting neuropsych evaluation  Discharge Plan: Anticipate discharge in >72 hrs to rehab facility.    Code Status: Level 3 - DNAR and DNI    Subjective   Reports neck pain and stiffness    Objective :  Temp:  [98.7 °F (37.1 °C)-99.1 °F (37.3 °C)] 99.1 °F (37.3 °C)  HR:  [] 109  BP: (112-132)/(69-94) 129/88  Resp:  [14-20] 14  SpO2:  [95 %-99 %] 95 %  O2 Device: None (Room air)    Body mass index is 20.25 kg/m².     Input and Output Summary (last 24 hours):     Intake/Output Summary (Last 24 hours) at 10/12/2024 1427  Last data filed at 10/12/2024 1300  Gross per 24 hour   Intake 480 ml   Output 900 ml   Net -420 ml       Physical Exam  Constitutional:       Appearance: Normal appearance.   HENT:      Head: Normocephalic and atraumatic.      Nose: Nose normal.   Eyes:      Extraocular Movements: Extraocular movements intact.   Cardiovascular:      Rate and Rhythm: Normal rate and regular rhythm.   Pulmonary:      Effort: Pulmonary effort is normal.      Breath sounds: No wheezing or rales.   Neurological:      Mental Status: She is alert. Mental status is at baseline.   Psychiatric:         Mood and Affect: Mood normal.         Behavior: Behavior normal.           Lines/Drains:  Lines/Drains/Airways       Active Status       Name Placement date Placement time Site Days    Urethral Catheter 10/08/24  --  --  4                  Urinary Catheter:  Goal for removal:  end of life care                 Lab Results: I have reviewed the  following results:   Results from last 7 days   Lab Units 10/09/24  0417 10/08/24  2053   WBC Thousand/uL  --  10.62*   HEMOGLOBIN g/dL  --  13.7   HEMATOCRIT %  --  45.4   PLATELETS Thousands/uL 365 362   SEGS PCT %  --  61   LYMPHO PCT %  --  23   MONO PCT %  --  9   EOS PCT %  --  5     Results from last 7 days   Lab Units 10/09/24  0736   SODIUM mmol/L 138   POTASSIUM mmol/L 3.3*   CHLORIDE mmol/L 104   CO2 mmol/L 25   BUN mg/dL 23   CREATININE mg/dL 0.81   ANION GAP mmol/L 9   CALCIUM mg/dL 8.8   ALBUMIN g/dL 3.1*   TOTAL BILIRUBIN mg/dL 0.44   ALK PHOS U/L 123*   ALT U/L 12   AST U/L 13   GLUCOSE RANDOM mg/dL 81         Results from last 7 days   Lab Units 10/08/24  2034 10/08/24  1947   POC GLUCOSE mg/dl 69 60*         Results from last 7 days   Lab Units 10/09/24  0000   LACTIC ACID mmol/L 0.8       Recent Cultures (last 7 days):   Results from last 7 days   Lab Units 10/09/24  0000 10/08/24  2053   BLOOD CULTURE  No Growth at 72 hrs. No Growth at 72 hrs.       Imaging Results Review: No pertinent imaging studies reviewed.  Other Study Results Review: No additional pertinent studies reviewed.    Last 24 Hours Medication List:     Current Facility-Administered Medications:     acetaminophen (TYLENOL) tablet 975 mg, Q8H    albuterol (PROVENTIL HFA,VENTOLIN HFA) inhaler 2 puff, Q4H PRN    buPROPion (WELLBUTRIN XL) 24 hr tablet 300 mg, Daily    clonazePAM (KlonoPIN) tablet 1 mg, TID    enoxaparin (LOVENOX) subcutaneous injection 40 mg, Daily    fentaNYL (DURAGESIC) 25 mcg/hr TD 72 hr patch 25 mcg, Q72H    haloperidol (HALDOL) tablet 1 mg, BID PRN    HYDROmorphone (DILAUDID) injection 0.5 mg, Q3H PRN    loratadine (CLARITIN) tablet 10 mg, Daily    LORazepam (ATIVAN) tablet 1 mg, Q6H PRN    methocarbamol (ROBAXIN) tablet 750 mg, Q8H CAROLYN    naloxone (NARCAN) intranasal 2 mg, Daily PRN    ondansetron (ZOFRAN) injection 4 mg, Q6H PRN    oxyCODONE (ROXICODONE) immediate release tablet 10 mg, Q3H PRN    oxyCODONE  (ROXICODONE) IR tablet 5 mg, Q3H PRN    QUEtiapine (SEROquel XR) 24 hr tablet 150 mg, HS    venlafaxine (EFFEXOR-XR) 24 hr capsule 225 mg, Daily    Administrative Statements   Today, Patient Was Seen By: Victorino Thorne MD  I have spent a total time of 40 minutes in caring for this patient on the day of the visit/encounter including Diagnostic results, Instructions for management, Patient and family education, Impressions, Counseling / Coordination of care, Documenting in the medical record, Reviewing / ordering tests, medicine, procedures  , Obtaining or reviewing history  , and Communicating with other healthcare professionals .    **Please Note: This note may have been constructed using a voice recognition system.**

## 2024-10-12 NOTE — NURSING NOTE
Patient bed alarm going off during shift change.  Patient found lying on the floor. Rapid response called for an unwitnessed fall.

## 2024-10-12 NOTE — ASSESSMENT & PLAN NOTE
MRI in 7/2024   Infectious/inflammatory changes from the occiput to cervical spine as   discussed above. Likely improved fluid collection in the left posterior   paraspinal soft tissues at C1-2 level. Otherwise no significant change from   06/29/2023 including epidural soft tissue thickening and enhancement and fluid   collection along the right aspect of C1-C2 joint and prevertebral space.     Patient went to hospice and completed 3 months of doxycycline  Still complains of pain - palliative care following for assistance with pain management  Pt was discharged for [facility] hospice services due to suspected diversion of narcotics by her sister Jaycee.  Pt was sent to the ED at [facility] for an opinion regarding plan of care  Palliative care and hospice services are following  D/w palliative care service - will change methadone to fentanyl patch  Discharge planning  Pt reports adequate pain relief at this time  Seen by adult protective services today, recommending a decision making capacity evaluation.  Pt is requesting rehabilitation today, will make DN3 and obtain PT evaluation

## 2024-10-13 PROCEDURE — 99232 SBSQ HOSP IP/OBS MODERATE 35: CPT | Performed by: INTERNAL MEDICINE

## 2024-10-13 RX ADMIN — CARBAMIDE PEROXIDE 6.5% 5 DROP: 6.5 LIQUID AURICULAR (OTIC) at 17:57

## 2024-10-13 RX ADMIN — BUPROPION HYDROCHLORIDE 300 MG: 150 TABLET, EXTENDED RELEASE ORAL at 08:05

## 2024-10-13 RX ADMIN — FENTANYL 25 MCG: 25 PATCH TRANSDERMAL at 21:40

## 2024-10-13 RX ADMIN — QUETIAPINE 150 MG: 150 TABLET, FILM COATED, EXTENDED RELEASE ORAL at 21:41

## 2024-10-13 RX ADMIN — ENOXAPARIN SODIUM 40 MG: 40 INJECTION SUBCUTANEOUS at 08:05

## 2024-10-13 RX ADMIN — ACETAMINOPHEN 975 MG: 325 TABLET ORAL at 11:53

## 2024-10-13 RX ADMIN — VENLAFAXINE HYDROCHLORIDE 225 MG: 150 CAPSULE, EXTENDED RELEASE ORAL at 08:08

## 2024-10-13 RX ADMIN — OXYCODONE HYDROCHLORIDE 10 MG: 10 TABLET ORAL at 08:12

## 2024-10-13 RX ADMIN — NYSTATIN: 100000 CREAM TOPICAL at 08:08

## 2024-10-13 RX ADMIN — METHOCARBAMOL 750 MG: 750 TABLET ORAL at 21:41

## 2024-10-13 RX ADMIN — CLONAZEPAM 1 MG: 1 TABLET ORAL at 16:24

## 2024-10-13 RX ADMIN — OXYCODONE HYDROCHLORIDE 10 MG: 10 TABLET ORAL at 02:47

## 2024-10-13 RX ADMIN — METHOCARBAMOL 750 MG: 750 TABLET ORAL at 16:24

## 2024-10-13 RX ADMIN — ACETAMINOPHEN 975 MG: 325 TABLET ORAL at 21:41

## 2024-10-13 RX ADMIN — OXYCODONE HYDROCHLORIDE 10 MG: 10 TABLET ORAL at 19:24

## 2024-10-13 RX ADMIN — CLONAZEPAM 1 MG: 1 TABLET ORAL at 08:05

## 2024-10-13 RX ADMIN — NYSTATIN: 100000 CREAM TOPICAL at 17:57

## 2024-10-13 RX ADMIN — LORATADINE 10 MG: 10 TABLET ORAL at 08:05

## 2024-10-13 RX ADMIN — OXYCODONE HYDROCHLORIDE 10 MG: 10 TABLET ORAL at 12:44

## 2024-10-13 RX ADMIN — CLONAZEPAM 1 MG: 1 TABLET ORAL at 21:41

## 2024-10-13 NOTE — PHYSICAL THERAPY NOTE
Physical Therapy Cancellation Note               10/13/24 7522   Note Type   Note type Cancelled Session;Evaluation   Cancel Reasons Medical status     PT orders received and chart review complete. Patient transitioned to level 3 code status with wishes to work with physical therapy. Per chart review, patient will need neurosx intervention if she does not pursue hospice care. Will await neurosx reconsult. Will continue to follow and evaluate as appropriate.     Vane Sanchez, PT

## 2024-10-13 NOTE — ASSESSMENT & PLAN NOTE
MRI in 7/2024   Infectious/inflammatory changes from the occiput to cervical spine as   discussed above. Likely improved fluid collection in the left posterior   paraspinal soft tissues at C1-2 level. Otherwise no significant change from   06/29/2023 including epidural soft tissue thickening and enhancement and fluid   collection along the right aspect of C1-C2 joint and prevertebral space.     Patient went to hospice and completed 3 months of doxycycline  Still complains of pain - palliative care following for assistance with pain management  Pt was discharged for [facility] hospice services due to suspected diversion of narcotics by her sister Jaycee.  Pt was sent to the ED at [facility] for an opinion regarding plan of care  Palliative care and hospice services are following  D/w palliative care service - will change methadone to fentanyl patch  Discharge planning  Pt reports adequate pain relief at this time  Seen by adult protective services today, recommending a decision making capacity evaluation.  PT is not appropriate for PT/OT due to her osteomyelitis

## 2024-10-13 NOTE — OCCUPATIONAL THERAPY NOTE
Occupational Therapy Cancellation Note       10/13/24 0813   Note Type   Note type Cancelled Session   Cancel Reasons Medical status       Orders received and chart reviewed. Pt was transitioned to level 3 code status w wish to see OT. However, per chart review, pt will need neurosx intervention if she does not pursue hospice care. Will await neurosx reconsult and possible capacity evaluation before proceeding with OT eval. Will continue to follow to be seen for OT evaluation as appropriate/when medically cleared.       Samara Jackson MS, OTR/L

## 2024-10-13 NOTE — PROGRESS NOTES
Progress Note - Hospitalist   Name: Alyssa Madrigal 64 y.o. female I MRN: [id number]  [facility] I Date of Admission: 10/8/2024   Date of Service: 10/13/2024 I Hospital Day: 5    Assessment & Plan  Osteomyelitis (HCC)  MRI in 7/2024   Infectious/inflammatory changes from the occiput to cervical spine as   discussed above. Likely improved fluid collection in the left posterior   paraspinal soft tissues at C1-2 level. Otherwise no significant change from   06/29/2023 including epidural soft tissue thickening and enhancement and fluid   collection along the right aspect of C1-C2 joint and prevertebral space.     Patient went to hospice and completed 3 months of doxycycline  Still complains of pain - palliative care following for assistance with pain management  Pt was discharged for [facility] hospice services due to suspected diversion of narcotics by her sister Jaycee.  Pt was sent to the ED at [facility] for an opinion regarding plan of care  Palliative care and hospice services are following  D/w palliative care service - will change methadone to fentanyl patch  Discharge planning  Pt reports adequate pain relief at this time  Seen by adult protective services today, recommending a decision making capacity evaluation.  PT is not appropriate for PT/OT due to her osteomyelitis  Controlled substance agreement broken  Plan noted above  COPD (chronic obstructive pulmonary disease) with chronic bronchitis (HCC)  Continue albuterol  Major depressive disorder, recurrent episode with anxious distress (HCC)  Continue venlaflaxine  IVDU (intravenous drug user)  Noted in history  Fall  Had a fall out of bed last night on 10/11. CT scans and Xrs are without any new traumatic injury  Fall precautions  Continue analgesics    VTE Pharmacologic Prophylaxis: VTE Score: 3 Moderate Risk (Score 3-4) - Pharmacological DVT Prophylaxis Ordered: heparin.    Mobility:   Basic Mobility Inpatient Raw Score: 6  [facility] Goal: 2: Bed  activities/Dependent transfer  JH-HLM Achieved: 4: Move to chair/commode  JH-HLM Goal achieved. Continue to encourage appropriate mobility.    Patient Centered Rounds: I performed bedside rounds with nursing staff today.   Discussions with Specialists or Other Care Team Provider: nurseCIPRIANO    Education and Discussions with Family / Patient:  confidential encounter.     Current Length of Stay: 5 day(s)  Current Patient Status: Inpatient   Certification Statement: The patient will continue to require additional inpatient hospital stay due to awiating neuropsych evaluation  amd discharge planning  Discharge Plan: Anticipate discharge in 48-72 hrs to rehab facility.    Code Status: Level 3 - DNAR and DNI    Subjective   Reports left ear pain    Objective :  Temp:  [98.7 °F (37.1 °C)-99.3 °F (37.4 °C)] 98.7 °F (37.1 °C)  HR:  [] 105  BP: (122-129)/(83-88) 124/83  Resp:  [12-14] 12  SpO2:  [91 %-98 %] 96 %  O2 Device: None (Room air)    Body mass index is 20.25 kg/m².     Input and Output Summary (last 24 hours):     Intake/Output Summary (Last 24 hours) at 10/13/2024 1403  Last data filed at 10/13/2024 1300  Gross per 24 hour   Intake 240 ml   Output 900 ml   Net -660 ml       Physical Exam  Constitutional:       Appearance: Normal appearance.   HENT:      Head: Normocephalic and atraumatic.      Left Ear: Tympanic membrane and external ear normal. There is impacted cerumen.      Nose: Nose normal.      Mouth/Throat:      Mouth: Mucous membranes are moist.      Pharynx: Oropharynx is clear.   Eyes:      Extraocular Movements: Extraocular movements intact.   Cardiovascular:      Rate and Rhythm: Normal rate and regular rhythm.   Pulmonary:      Effort: Pulmonary effort is normal.      Breath sounds: No wheezing or rales.   Neurological:      Mental Status: She is alert. She is disoriented.   Psychiatric:      Comments: Emotional, crying often           Lines/Drains:  Lines/Drains/Airways       Active Status        Name Placement date Placement time Site Days    Urethral Catheter 10/08/24  --  --  5                  Urinary Catheter:  Goal for removal:  end of life care                 Lab Results: I have reviewed the following results:   Results from last 7 days   Lab Units 10/09/24  0417 10/08/24  2053   WBC Thousand/uL  --  10.62*   HEMOGLOBIN g/dL  --  13.7   HEMATOCRIT %  --  45.4   PLATELETS Thousands/uL 365 362   SEGS PCT %  --  61   LYMPHO PCT %  --  23   MONO PCT %  --  9   EOS PCT %  --  5     Results from last 7 days   Lab Units 10/09/24  0736   SODIUM mmol/L 138   POTASSIUM mmol/L 3.3*   CHLORIDE mmol/L 104   CO2 mmol/L 25   BUN mg/dL 23   CREATININE mg/dL 0.81   ANION GAP mmol/L 9   CALCIUM mg/dL 8.8   ALBUMIN g/dL 3.1*   TOTAL BILIRUBIN mg/dL 0.44   ALK PHOS U/L 123*   ALT U/L 12   AST U/L 13   GLUCOSE RANDOM mg/dL 81         Results from last 7 days   Lab Units 10/08/24  2034 10/08/24  1947   POC GLUCOSE mg/dl 69 60*         Results from last 7 days   Lab Units 10/09/24  0000   LACTIC ACID mmol/L 0.8       Recent Cultures (last 7 days):   Results from last 7 days   Lab Units 10/09/24  0000 10/08/24  2053   BLOOD CULTURE  No Growth After 4 Days. No Growth After 4 Days.       Imaging Results Review: No pertinent imaging studies reviewed.  Other Study Results Review: No additional pertinent studies reviewed.    Last 24 Hours Medication List:     Current Facility-Administered Medications:     acetaminophen (TYLENOL) tablet 975 mg, Q8H    albuterol (PROVENTIL HFA,VENTOLIN HFA) inhaler 2 puff, Q4H PRN    buPROPion (WELLBUTRIN XL) 24 hr tablet 300 mg, Daily    carbamide peroxide (DEBROX) 6.5 % otic solution 5 drop, BID    clonazePAM (KlonoPIN) tablet 1 mg, TID    enoxaparin (LOVENOX) subcutaneous injection 40 mg, Daily    fentaNYL (DURAGESIC) 25 mcg/hr TD 72 hr patch 25 mcg, Q72H    haloperidol (HALDOL) tablet 1 mg, BID PRN    HYDROmorphone (DILAUDID) injection 0.5 mg, Q3H PRN    loratadine (CLARITIN) tablet 10 mg,  Daily    LORazepam (ATIVAN) tablet 1 mg, Q6H PRN    methocarbamol (ROBAXIN) tablet 750 mg, Q8H CAROLYN    naloxone (NARCAN) intranasal 2 mg, Daily PRN    nystatin (MYCOSTATIN) cream, BID    ondansetron (ZOFRAN) injection 4 mg, Q6H PRN    oxyCODONE (ROXICODONE) immediate release tablet 10 mg, Q3H PRN    oxyCODONE (ROXICODONE) IR tablet 5 mg, Q3H PRN    QUEtiapine (SEROquel XR) 24 hr tablet 150 mg, HS    venlafaxine (EFFEXOR-XR) 24 hr capsule 225 mg, Daily    Administrative Statements   Today, Patient Was Seen By: Victorino Thorne MD  I have spent a total time of 40 minutes in caring for this patient on the day of the visit/encounter including Diagnostic results, Prognosis, Risks and benefits of tx options, Instructions for management, Patient and family education, Impressions, Counseling / Coordination of care, Documenting in the medical record, Reviewing / ordering tests, medicine, procedures  , Obtaining or reviewing history  , and Communicating with other healthcare professionals .    **Please Note: This note may have been constructed using a voice recognition system.**

## 2024-10-14 PROBLEM — G24.01 TARDIVE DYSKINESIA: Chronic | Status: ACTIVE | Noted: 2024-10-14

## 2024-10-14 PROBLEM — Z51.5 PALLIATIVE CARE BY SPECIALIST: Status: ACTIVE | Noted: 2024-10-14

## 2024-10-14 LAB
BACTERIA BLD CULT: NORMAL
BACTERIA BLD CULT: NORMAL
GLUCOSE SERPL-MCNC: 125 MG/DL (ref 65–140)

## 2024-10-14 PROCEDURE — 99232 SBSQ HOSP IP/OBS MODERATE 35: CPT | Performed by: INTERNAL MEDICINE

## 2024-10-14 PROCEDURE — 82948 REAGENT STRIP/BLOOD GLUCOSE: CPT

## 2024-10-14 PROCEDURE — 99233 SBSQ HOSP IP/OBS HIGH 50: CPT | Performed by: FAMILY MEDICINE

## 2024-10-14 RX ORDER — GABAPENTIN 250 MG/5ML
250 SOLUTION ORAL 2 TIMES DAILY
Status: DISCONTINUED | OUTPATIENT
Start: 2024-10-14 | End: 2024-10-17

## 2024-10-14 RX ORDER — METHOCARBAMOL 500 MG/1
500 TABLET, FILM COATED ORAL
Status: DISCONTINUED | OUTPATIENT
Start: 2024-10-14 | End: 2024-10-28 | Stop reason: HOSPADM

## 2024-10-14 RX ORDER — LORAZEPAM 2 MG/ML
1 INJECTION INTRAMUSCULAR EVERY 6 HOURS PRN
Status: DISCONTINUED | OUTPATIENT
Start: 2024-10-14 | End: 2024-10-28 | Stop reason: HOSPADM

## 2024-10-14 RX ORDER — BUPROPION HYDROCHLORIDE 150 MG/1
150 TABLET ORAL DAILY
Status: DISCONTINUED | OUTPATIENT
Start: 2024-10-15 | End: 2024-10-28 | Stop reason: HOSPADM

## 2024-10-14 RX ORDER — LORAZEPAM 2 MG/ML
2 INJECTION INTRAMUSCULAR
Status: DISCONTINUED | OUTPATIENT
Start: 2024-10-14 | End: 2024-10-28 | Stop reason: HOSPADM

## 2024-10-14 RX ORDER — ACETAMINOPHEN 160 MG/5ML
320 SUSPENSION ORAL
Status: DISCONTINUED | OUTPATIENT
Start: 2024-10-14 | End: 2024-10-28 | Stop reason: HOSPADM

## 2024-10-14 RX ADMIN — OXYCODONE HYDROCHLORIDE 10 MG: 10 TABLET ORAL at 02:40

## 2024-10-14 RX ADMIN — LORAZEPAM 1 MG: 1 TABLET ORAL at 03:38

## 2024-10-14 RX ADMIN — NYSTATIN: 100000 CREAM TOPICAL at 10:24

## 2024-10-14 RX ADMIN — ACETAMINOPHEN 320 MG: 650 SUSPENSION ORAL at 21:40

## 2024-10-14 RX ADMIN — CLONAZEPAM 1 MG: 1 TABLET ORAL at 10:23

## 2024-10-14 RX ADMIN — OXYCODONE HYDROCHLORIDE 5 MG: 5 TABLET ORAL at 21:44

## 2024-10-14 RX ADMIN — ENOXAPARIN SODIUM 40 MG: 40 INJECTION SUBCUTANEOUS at 10:22

## 2024-10-14 RX ADMIN — ACETAMINOPHEN 320 MG: 650 SUSPENSION ORAL at 13:52

## 2024-10-14 RX ADMIN — QUETIAPINE 150 MG: 150 TABLET, FILM COATED, EXTENDED RELEASE ORAL at 21:47

## 2024-10-14 RX ADMIN — CLONAZEPAM 1 MG: 1 TABLET ORAL at 21:44

## 2024-10-14 RX ADMIN — GABAPENTIN 250 MG: 250 SOLUTION ORAL at 13:15

## 2024-10-14 RX ADMIN — BUPROPION HYDROCHLORIDE 300 MG: 150 TABLET, EXTENDED RELEASE ORAL at 10:22

## 2024-10-14 RX ADMIN — OXYCODONE HYDROCHLORIDE 10 MG: 10 TABLET ORAL at 15:52

## 2024-10-14 RX ADMIN — ACETAMINOPHEN 975 MG: 325 TABLET ORAL at 03:38

## 2024-10-14 RX ADMIN — METHOCARBAMOL 500 MG: 500 TABLET ORAL at 15:51

## 2024-10-14 RX ADMIN — CLONAZEPAM 1 MG: 1 TABLET ORAL at 15:52

## 2024-10-14 RX ADMIN — METHOCARBAMOL 500 MG: 500 TABLET ORAL at 11:40

## 2024-10-14 RX ADMIN — LORATADINE 10 MG: 10 TABLET ORAL at 10:22

## 2024-10-14 RX ADMIN — CARBAMIDE PEROXIDE 6.5% 5 DROP: 6.5 LIQUID AURICULAR (OTIC) at 21:48

## 2024-10-14 RX ADMIN — METHOCARBAMOL 500 MG: 500 TABLET ORAL at 21:44

## 2024-10-14 RX ADMIN — OXYCODONE HYDROCHLORIDE 10 MG: 10 TABLET ORAL at 19:25

## 2024-10-14 RX ADMIN — HALOPERIDOL 1 MG: 1 TABLET ORAL at 05:06

## 2024-10-14 RX ADMIN — VENLAFAXINE HYDROCHLORIDE 225 MG: 150 CAPSULE, EXTENDED RELEASE ORAL at 10:24

## 2024-10-14 RX ADMIN — LORAZEPAM 1 MG: 2 INJECTION INTRAMUSCULAR; INTRAVENOUS at 11:42

## 2024-10-14 NOTE — RESTORATIVE TECHNICIAN NOTE
Restorative Technician Note      Patient Name: Alyssa Madrigal     Restorative Tech Visit Date: 10/14/24  Note Type: Mobility  Patient Position Upon Consult: Supine  Activity Performed: Repositioned; Range of motion; Other (Comment) (assisted with ADLs)  Education Provided: Yes  Patient Position at End of Consult: All needs within reach; Bed/Chair alarm activated; Other (comment) (chair position in bed and donned waist elizabeth)    Lindsay Maddox  DPT, Restorative Technician

## 2024-10-14 NOTE — OCCUPATIONAL THERAPY NOTE
Occupational Therapy Cancel Note         Patient Name: Alyssa Madrigal  Today's Date: 10/14/2024       10/14/24 1312   OT Last Visit   OT Visit Date 10/14/24   Note Type   Note type Cancelled Session   Cancel Reasons Other     OT orders received. Chart reviewed. Pt previously on hospice, requested PT/OT. Pt underwent neuropsych eval, deemed not competent. Per Dr. Thorne, pt not appropriate for therapy services at this time. Pending discussion on GOC. Will continue to follow and see pt as appropriate and able pending plan.    Gretchen Donaldson MS, OTR/L

## 2024-10-14 NOTE — ASSESSMENT & PLAN NOTE
- Pt will not be offered any controlled substance on discharge, unless she is discharged to a skilled environment.

## 2024-10-14 NOTE — PHYSICAL THERAPY NOTE
Physical Therapy Cancellation Note         10/14/24 1300   PT Last Visit   PT Visit Date 10/14/24   Note Type   Note type Cancelled Session   Cancel Reasons Other     PT orders received. Chart reviewed. Pt previously on hospice, requested new PT/OT. Pt underwent neuropsych eval, deemed not competent. Per Dr. Thorne, pt not appropriate for therapy services at this time. Pending discussion on GOC vs neurosurgical plan. Will continue to follow.

## 2024-10-14 NOTE — PROGRESS NOTES
Progress Note - Palliative Care   Name: Alyssa Madrigal 64 y.o. female I MRN: [id number]  [facility] Date of Admission: 10/8/2024   Date of Service: 10/14/2024 I Hospital Day: 6     Assessment & Plan  Osteomyelitis (HCC)    COPD (chronic obstructive pulmonary disease) with chronic bronchitis (HCC)    Major depressive disorder, recurrent episode with anxious distress (HCC)  - Reduce bupropion today 300mg=>150mg for anxious and impulsive behaviors.  - add gabapentin for anxiety  IVDU (intravenous drug user)  - Pt will not be offered any controlled substance on discharge, unless she is discharged to a skilled environment.  Palliative care by specialist  Goals - maintain safe environment in hospital, DNR3, with proportional symptom management   - Pt deemed not competent today by NPsych professional.   - Incompetent persons cannot consent to hospice.   - Pt has no willing decision-makers   - Pt will likely need a guardian.  Post laminectomy syndrome    Posttraumatic stress disorder  Likely to benefit from safe environs and multimodal psychotropic management  Tardive dyskinesia  Complicates ability to swallow pills; will trial liquids where possible          NARRATIVE AND INTERVAL HISTORY:       Since last vsiit, pt has requested to D/C to home, to trial rehab, to return to her normal life.  She was not felt to be insightful, and so she has been referred to NPsych.    This AM, she has completed NPsych eval, and found to be inappropriate for medical decision making.  Today at bedside, she has poor orientation to situation, and little to no insight to her own safety concerns.      In discussion with our CM professionals, it seems she has no willing / safe family nor community members to guide her cares.  CM team is expecting to need to petition for guardianship.        On my visit this AM, there are no family at bedside, and the pt is not appropriately responsive.  Nursing reports and charts are reviewed.   "Bedside rounding performed with RN.  Pt continues to report that she has pain, and headache, after a fall and headstrike.  She denies other symptoms, apart from homesickness.    MEDICATIONS / ALLERGIES:     all current active meds have been reviewed and current meds:   Current Facility-Administered Medications:     acetaminophen (TYLENOL) tablet 975 mg, Q8H    albuterol (PROVENTIL HFA,VENTOLIN HFA) inhaler 2 puff, Q4H PRN    [START ON 10/15/2024] buPROPion (WELLBUTRIN XL) 24 hr tablet 150 mg, Daily    carbamide peroxide (DEBROX) 6.5 % otic solution 5 drop, BID    clonazePAM (KlonoPIN) tablet 1 mg, TID    enoxaparin (LOVENOX) subcutaneous injection 40 mg, Daily    fentaNYL (DURAGESIC) 25 mcg/hr TD 72 hr patch 25 mcg, Q72H    gabapentin (NEURONTIN) oral solution 250 mg, BID    haloperidol (HALDOL) tablet 1 mg, BID PRN    HYDROmorphone (DILAUDID) injection 0.5 mg, Q3H PRN    loratadine (CLARITIN) tablet 10 mg, Daily    LORazepam (ATIVAN) tablet 1 mg, Q6H PRN    methocarbamol (ROBAXIN) tablet 750 mg, Q8H CAROLYN    naloxone (NARCAN) intranasal 2 mg, Daily PRN    nystatin (MYCOSTATIN) cream, BID    ondansetron (ZOFRAN) injection 4 mg, Q6H PRN    oxyCODONE (ROXICODONE) immediate release tablet 10 mg, Q3H PRN    oxyCODONE (ROXICODONE) IR tablet 5 mg, Q3H PRN    QUEtiapine (SEROquel XR) 24 hr tablet 150 mg, HS    venlafaxine (EFFEXOR-XR) 24 hr capsule 225 mg, Daily    Allergies   Allergen Reactions    Other      PT \"There is some other antibiotic that I took,its a really long name. I dont remember the name\"     Pollen Extract Other (See Comments)     Sinus headache.    Sulfa Antibiotics      Unknown reaction      Cefazolin Hives and Rash       OBJECTIVE:    Physical Exam  Physical Exam  Constitutional:       General: She is not in acute distress.     Appearance: She is ill-appearing. She is not toxic-appearing or diaphoretic.      Comments: frail   HENT:      Head: Normocephalic and atraumatic.      Right Ear: External ear " "normal.      Left Ear: External ear normal.   Eyes:      General:         Right eye: No discharge.         Left eye: No discharge.      Conjunctiva/sclera: Conjunctivae normal.      Pupils: Pupils are equal, round, and reactive to light.   Neck:      Trachea: No tracheal deviation.   Cardiovascular:      Rate and Rhythm: Regular rhythm. Tachycardia present.   Pulmonary:      Effort: Pulmonary effort is normal. No respiratory distress.      Breath sounds: No stridor.   Abdominal:      General: There is no distension.      Palpations: Abdomen is soft.   Skin:     General: Skin is warm and dry.      Findings: No erythema or rash.   Neurological:      General: No focal deficit present.      Mental Status: She is disoriented.      Coordination: Coordination abnormal (rare, mild lip smacking behaviors).   Psychiatric:      Comments: Not well oriented, not insightful.  Endorses 'okay' mood, but affect is markedly labile         Lab Results: I have personally reviewed pertinent labs., CBC: No results found for: \"WBC\", \"HGB\", \"HCT\", \"MCV\", \"PLT\", \"ADJUSTEDWBC\", \"RBC\", \"MCH\", \"MCHC\", \"RDW\", \"MPV\", \"NRBC\", CMP: No results found for: \"SODIUM\", \"K\", \"CL\", \"CO2\", \"ANIONGAP\", \"BUN\", \"CREATININE\", \"GLUCOSE\", \"CALCIUM\", \"AST\", \"ALT\", \"ALKPHOS\", \"PROT\", \"BILITOT\", \"EGFR\", BMP:No results found for: \"SODIUM\", \"K\", \"CL\", \"CO2\", \"BUN\", \"CREATININE\", \"GLUC\", \"GLUF\", \"CALCIUM\", \"AGAP\", \"EGFR\", PT/PTT:No results found for: \"PT\", \"PTT\"  Imaging Studies: none new;  EKG, Pathology, and Other Studies: none pertinent     I have spent a total time of 40+ minutes in caring for this patient on the day of the visit/encounter including chart review; symptom pursuit; supportive listening; anticipatory guidance. Polypharmacy management in frail elder without competence, and a recent h/o inappropriate medication use and possible overdose.    "

## 2024-10-14 NOTE — PROGRESS NOTES
Progress Note - Hospitalist   Name: Alyssa Madrigal 64 y.o. female I MRN: [id number]  Unit/Bed#: [facility] 815-01 I Date of Admission: 10/8/2024   Date of Service: 10/14/2024 I Hospital Day: 6    Assessment & Plan  Osteomyelitis (HCC)  MRI in 7/2024   Infectious/inflammatory changes from the occiput to cervical spine as   discussed above. Likely improved fluid collection in the left posterior   paraspinal soft tissues at C1-2 level. Otherwise no significant change from   06/29/2023 including epidural soft tissue thickening and enhancement and fluid   collection along the right aspect of C1-C2 joint and prevertebral space.     Patient went to hospice and completed 3 months of doxycycline  Still complains of pain - palliative care following for assistance with pain management  Pt was discharged for [facility] hospice services due to suspected diversion of narcotics by her sister Jaycee.  Pt was sent to the ED at [facility] for an opinion regarding plan of care  Palliative care and hospice services are following  D/w palliative care service - changed methadone to fentanyl patch to assist with discharge planning to a facility  Pt reports adequate pain relief at this time  Seen by neuropsychology - pt does not have capacity  PT is not appropriate for PT/OT due to her osteomyelitis  Discharge planning to SNF for long term care, may need a guardian if there are no willing family members.  Controlled substance agreement broken  Plan noted above  COPD (chronic obstructive pulmonary disease) with chronic bronchitis (HCC)  Continue albuterol  Major depressive disorder, recurrent episode with anxious distress (HCC)  Continue venlaflaxine  IVDU (intravenous drug user)  Noted in history  Fall  Had a fall out of bed last night on 10/11. CT scans and Xrs are without any new traumatic injury  Fall precautions  Continue analgesics  Post laminectomy syndrome    Posttraumatic stress disorder    Palliative care by specialist    Armando  dyskinesia      VTE Pharmacologic Prophylaxis: VTE Score: 3 Moderate Risk (Score 3-4) - Pharmacological DVT Prophylaxis Ordered: heparin.    Mobility:   Basic Mobility Inpatient Raw Score: 6  JH-HLM Goal: 2: Bed activities/Dependent transfer  JH-HLM Achieved: 2: Bed activities/Dependent transfer  JH-HLM Goal achieved. Continue to encourage appropriate mobility.    Patient Centered Rounds: I performed bedside rounds with nursing staff today.   Discussions with Specialists or Other Care Team Provider: nurse, CM    Education and Discussions with Family / Patient:  confidential encounter.     Current Length of Stay: 6 day(s)  Current Patient Status: Inpatient   Certification Statement: The patient will continue to require additional inpatient hospital stay due to awiating neuropsych evaluation  amd discharge planning  Discharge Plan: Anticipate discharge in 48-72 hrs to rehab facility.    Code Status: Level 3 - DNAR and DNI    Subjective   Reports left ear pain    Objective :  Temp:  [99 °F (37.2 °C)-99.3 °F (37.4 °C)] 99.3 °F (37.4 °C)  HR:  [96-98] 98  BP: (120-129)/(84-87) 127/87  Resp:  [16] 16  SpO2:  [88 %-94 %] 94 %  O2 Device: None (Room air)    Body mass index is 20.43 kg/m².     Input and Output Summary (last 24 hours):     Intake/Output Summary (Last 24 hours) at 10/14/2024 1249  Last data filed at 10/14/2024 1234  Gross per 24 hour   Intake 594 ml   Output 350 ml   Net 244 ml       Physical Exam  Constitutional:       Appearance: Normal appearance.   HENT:      Head: Normocephalic and atraumatic.      Nose: Nose normal.      Mouth/Throat:      Mouth: Mucous membranes are moist.      Pharynx: Oropharynx is clear.   Eyes:      Extraocular Movements: Extraocular movements intact.   Cardiovascular:      Rate and Rhythm: Normal rate and regular rhythm.   Pulmonary:      Effort: Pulmonary effort is normal.      Breath sounds: No wheezing or rales.   Neurological:      Mental Status: She is alert. She is  disoriented.   Psychiatric:      Comments: Emotional, crying often           Lines/Drains:  Lines/Drains/Airways       Active Status       Name Placement date Placement time Site Days    Urethral Catheter 10/08/24  --  --  6                  Urinary Catheter:  Goal for removal:  end of life care                 Lab Results: I have reviewed the following results:   Results from last 7 days   Lab Units 10/09/24  0417 10/08/24  2053   WBC Thousand/uL  --  10.62*   HEMOGLOBIN g/dL  --  13.7   HEMATOCRIT %  --  45.4   PLATELETS Thousands/uL 365 362   SEGS PCT %  --  61   LYMPHO PCT %  --  23   MONO PCT %  --  9   EOS PCT %  --  5     Results from last 7 days   Lab Units 10/09/24  0736   SODIUM mmol/L 138   POTASSIUM mmol/L 3.3*   CHLORIDE mmol/L 104   CO2 mmol/L 25   BUN mg/dL 23   CREATININE mg/dL 0.81   ANION GAP mmol/L 9   CALCIUM mg/dL 8.8   ALBUMIN g/dL 3.1*   TOTAL BILIRUBIN mg/dL 0.44   ALK PHOS U/L 123*   ALT U/L 12   AST U/L 13   GLUCOSE RANDOM mg/dL 81         Results from last 7 days   Lab Units 10/14/24  1107 10/08/24  2034 10/08/24  1947   POC GLUCOSE mg/dl 125 69 60*         Results from last 7 days   Lab Units 10/09/24  0000   LACTIC ACID mmol/L 0.8       Recent Cultures (last 7 days):   Results from last 7 days   Lab Units 10/09/24  0000 10/08/24  2053   BLOOD CULTURE  No Growth After 5 Days. No Growth After 4 Days.       Imaging Results Review: No pertinent imaging studies reviewed.  Other Study Results Review: No additional pertinent studies reviewed.    Last 24 Hours Medication List:     Current Facility-Administered Medications:     acetaminophen (TYLENOL) oral suspension 320 mg, 4x Daily (with meals and at bedtime)    albuterol (PROVENTIL HFA,VENTOLIN HFA) inhaler 2 puff, Q4H PRN    [START ON 10/15/2024] buPROPion (WELLBUTRIN XL) 24 hr tablet 150 mg, Daily    carbamide peroxide (DEBROX) 6.5 % otic solution 5 drop, BID    clonazePAM (KlonoPIN) tablet 1 mg, TID    enoxaparin (LOVENOX) subcutaneous  injection 40 mg, Daily    fentaNYL (DURAGESIC) 25 mcg/hr TD 72 hr patch 25 mcg, Q72H    gabapentin (NEURONTIN) oral solution 250 mg, BID    haloperidol (HALDOL) tablet 1 mg, BID PRN    HYDROmorphone (DILAUDID) injection 0.5 mg, Q3H PRN    loratadine (CLARITIN) tablet 10 mg, Daily    LORazepam (ATIVAN) injection 1 mg, Q6H PRN    LORazepam (ATIVAN) injection 2 mg, Q10 Min PRN    methocarbamol (ROBAXIN) tablet 500 mg, 4x Daily (with meals and at bedtime)    naloxone (NARCAN) intranasal 2 mg, Daily PRN    nystatin (MYCOSTATIN) cream, BID    ondansetron (ZOFRAN) injection 4 mg, Q6H PRN    oxyCODONE (ROXICODONE) immediate release tablet 10 mg, Q3H PRN    oxyCODONE (ROXICODONE) IR tablet 5 mg, Q3H PRN    QUEtiapine (SEROquel XR) 24 hr tablet 150 mg, HS    venlafaxine (EFFEXOR-XR) 24 hr capsule 225 mg, Daily    Administrative Statements   Today, Patient Was Seen By: Victorino Thorne MD  I have spent a total time of 40 minutes in caring for this patient on the day of the visit/encounter including Diagnostic results, Prognosis, Risks and benefits of tx options, Instructions for management, Patient and family education, Impressions, Counseling / Coordination of care, Documenting in the medical record, Reviewing / ordering tests, medicine, procedures  , Obtaining or reviewing history  , and Communicating with other healthcare professionals .    **Please Note: This note may have been constructed using a voice recognition system.**

## 2024-10-14 NOTE — ASSESSMENT & PLAN NOTE
- Reduce bupropion today 300mg=>150mg for anxious and impulsive behaviors.  - add gabapentin for anxiety

## 2024-10-14 NOTE — ASSESSMENT & PLAN NOTE
Goals - maintain safe environment in hospital, DNR3, with proportional symptom management   - Pt deemed not competent today by NPsych professional.   - Incompetent persons cannot consent to hospice.   - Pt has no willing decision-makers   - Pt will likely need a guardian.

## 2024-10-14 NOTE — CASE MANAGEMENT
Case Management Progress Note    Patient name Alyssa Madrigal  Location [facility] 815-01 MRN [id number]  : 1960 Date 10/14/2024       LOS (days): 6  Geometric Mean LOS (GMLOS) (days): 4.2  Days to GMLOS:-1.5        PROGRESS NOTE:    CM called pt's sister Angelica Guy ([phone]) to inform her that per Dr Gutierrez, pt does not have capacity.  Kathleen states that she is not able or willing to be pt's legal guardian.  Kathleen states that nobody in her family is able or willing as well.  This CM will reach out to all family to confirm.    CM called pt's mother Alexander Monzon ([phone]).  Per Alexander, she is unable to be patients guardian and states that her  (pt's father) has severe dementia and is incapable of guardianship as well.   Alexander states that she would like the state to handle pt's decision making.    Of note, Majo Bang is not an option as a guardian due to pt's requests prior to neurpsych determination and hx of medication diversion.

## 2024-10-14 NOTE — CONSULTS
Consultation - Neuropsychology/Psychology Department  Alyssa Madrigal 64 y.o. female MRN: [id number]  Unit/Bed#: [facility] 815-01 Encounter: [id number]        Reason for Consultation:  Alyssa Madrigal is a 64 y.o. year old female who was referred for a Neuropsychological Exam to assess cognitive functioning and comment on capacity to make informed medical decisions.      History of Present Illness Osteomyelitis  Physician Requesting Consult: Victorino Thorne MD    PROBLEM LIST:  Patient Active Problem List   Diagnosis    B12 deficiency    Chronic back pain    Cocaine abuse (HCC)    Controlled substance agreement broken    COPD (chronic obstructive pulmonary disease) with chronic bronchitis (HCC)    DDD (degenerative disc disease), lumbosacral    Discitis of lumbar region    Depression    Major depressive disorder, recurrent episode with anxious distress (HCC)    Mechanical breakdown of internal orthopedic device (HCC)    Osteoarthrosis, unspecified whether generalized or localized, pelvic region and thigh    Osteomyelitis (HCC)    Post laminectomy syndrome    Posttraumatic stress disorder    Prolonged Q-T interval on ECG    Right foot drop    Tobacco abuse    Left forearm abscess    IVDU (intravenous drug user)    history of Hepatitis C    Sacral decubitus ulcer (present on admission)     Aftercare following surgery of the musculoskeletal system    Right hip pain    Fall         Historical Information   Past Medical History:   Diagnosis Date    Back pain     Drug use     Heroin abuse (HCC)     Neck pain      Past Surgical History:   Procedure Laterality Date    BACK SURGERY      fusion    FL GUIDED NEEDLE PLAC BX/ASP/INJ  7/23/2020    FL GUIDED NEEDLE PLAC BX/ASP/INJ  7/23/2020    FL GUIDED NEEDLE PLAC BX/ASP/INJ  11/3/2020    FL GUIDED NEEDLE PLAC BX/ASP/INJ  6/25/2021    HIP SURGERY Bilateral     NECK SURGERY      fusion     Social History   Social History     Substance and Sexual Activity   Alcohol Use Not Currently  "    Social History     Substance and Sexual Activity   Drug Use Not Currently    Types: Heroin, Marijuana    Comment: heroin: 19  marijuana: 20     Social History     Tobacco Use   Smoking Status Former    Current packs/day: 0.00    Types: Cigarettes    Quit date: 2022    Years since quittin.2   Smokeless Tobacco Never     Family History: No family history on file.    Meds/Allergies   current meds:   Current Facility-Administered Medications:     acetaminophen (TYLENOL) tablet 975 mg, Q8H    albuterol (PROVENTIL HFA,VENTOLIN HFA) inhaler 2 puff, Q4H PRN    buPROPion (WELLBUTRIN XL) 24 hr tablet 300 mg, Daily    carbamide peroxide (DEBROX) 6.5 % otic solution 5 drop, BID    clonazePAM (KlonoPIN) tablet 1 mg, TID    enoxaparin (LOVENOX) subcutaneous injection 40 mg, Daily    fentaNYL (DURAGESIC) 25 mcg/hr TD 72 hr patch 25 mcg, Q72H    haloperidol (HALDOL) tablet 1 mg, BID PRN    HYDROmorphone (DILAUDID) injection 0.5 mg, Q3H PRN    loratadine (CLARITIN) tablet 10 mg, Daily    LORazepam (ATIVAN) tablet 1 mg, Q6H PRN    methocarbamol (ROBAXIN) tablet 750 mg, Q8H CAROLYN    naloxone (NARCAN) intranasal 2 mg, Daily PRN    nystatin (MYCOSTATIN) cream, BID    ondansetron (ZOFRAN) injection 4 mg, Q6H PRN    oxyCODONE (ROXICODONE) immediate release tablet 10 mg, Q3H PRN    oxyCODONE (ROXICODONE) IR tablet 5 mg, Q3H PRN    QUEtiapine (SEROquel XR) 24 hr tablet 150 mg, HS    venlafaxine (EFFEXOR-XR) 24 hr capsule 225 mg, Daily    Allergies   Allergen Reactions    Other      PT \"There is some other antibiotic that I took,its a really long name. I dont remember the name\"     Pollen Extract Other (See Comments)     Sinus headache.    Sulfa Antibiotics      Unknown reaction      Cefazolin Hives and Rash         Family and Social Support:   No data recorded    Behavioral Observations: Patient was alert, UNABLE to accurately state the name of city, day/week, and day/month; affect appeared labile marked by episodes of " tearfulness and patient admitted to depressed mood and anxiety; thoughts at times were tangential; patient reported she was hospitalized due to headache and does not know what she is being treated for in hospital; unable to provide medical history; states she will be returning home upon d/c    Cognitive Examination    General Cognitive Functioning MMSE = Impaired 15/28;     Attention/Concentration Auditory Selective Attention = Impaired;  Auditory Vigilance = Impaired; Information Processing Speed = Within Normal Limits    Frontal Systems/Executive Functioning Mental Flexibility/Cognitive Control = Impaired; Working Memory = Impaired Abstract Reasoning = Impaired; Generative Ability = Impaired,     Language Functioning Confrontation naming = Within Normal Limits, Phonemic Fluency = Impaired; Semantic Retrieval = Impaired; Comprehension of Complex Ideational Material = Impaired;  Praxis = Within Normal Limits; Repetition = Within Normal Limits; Basic Reading = Impaired; Following Commands = Impaired    Memory Functioning Narrative Recall - Short Delay = Impaired; Long Delay Narrative Recall = Impaired; Three word recall = Impaired    Visuo-Spatial Abilities Not Assessed    Functional Knowledge  Health & Safety Knowledge = Impaired;     Summary/Impression:  Results of Neuropsychological Exam revealed diffuse cognitive dysfunction and on a measure assessing awareness of personal health status and ability to evaluate health problems, handle medical emergencies and take safety precautions, patient performed in the IMPAIRED range of functioning. During this encounter, patient does not appear to have capacity to make fully informed medical decisions.

## 2024-10-14 NOTE — WOUND OSTOMY CARE
Consult Note - Wound   Alyssa Madrigal 64 y.o. female MRN: [id number]  Unit/Bed#: [facility] 815-01 Encounter: [id number]        History and Present Illness: Patient is seen for wound care consult . Patient is a 64 year old female admitted with osteomyelitis , COPD , major depressive disorder , IVDU , post laminectomy syndrome , tardive dyskinesia , and palliative care . Continent of bowel and bladder .       Assessment Findings:   Right and left breast dry and intact   Right and left heels dry and intact   Sacral buttocks dry and intact     Discussed the plan with the bedside nurse .    Skin Care orders:  1-Hydraguard to sacrum, buttocks and heels BID and PRN  2-EHOB  cushion when out of bed in chair.  3-Moisturize skin daily with skin nourishing cream  4-Elevate heels to offload pressure.  5-Turn/reposition q2h for pressure re-distribution on skin          Wounds:  Wound 10/09/24 Thigh Anterior;Proximal;Right (Active)   Wound Image   10/09/24 1535   Wound Description Clean;Dry;Intact 10/14/24 1700   Dilma-wound Assessment Clean;Dry;Intact 10/14/24 1700   Wound Length (cm) 0 cm 10/14/24 1700   Wound Width (cm) 0 cm 10/14/24 1700   Wound Depth (cm) 0 cm 10/14/24 1700   Wound Surface Area (cm^2) 0 cm^2 10/14/24 1700   Wound Volume (cm^3) 0 cm^3 10/14/24 1700   Calculated Wound Volume (cm^3) 0 cm^3 10/14/24 1700   Drainage Amount None 10/14/24 1700   Non-staged Wound Description Not applicable 10/14/24 1700   Treatments Cleansed;Site care 10/14/24 1700   Dressing Open to air 10/14/24 1700   Patient Tolerance Tolerated well 10/14/24 1700       Right hip old scarred area     Right breast dry and intact   Left breast dry and intact     Sacral buttocks dry and intact       Wound care will sign off secure chat with questions     Patrica Dumont RN BSN CWOCN

## 2024-10-14 NOTE — ASSESSMENT & PLAN NOTE
MRI in 7/2024   Infectious/inflammatory changes from the occiput to cervical spine as   discussed above. Likely improved fluid collection in the left posterior   paraspinal soft tissues at C1-2 level. Otherwise no significant change from   06/29/2023 including epidural soft tissue thickening and enhancement and fluid   collection along the right aspect of C1-C2 joint and prevertebral space.     Patient went to hospice and completed 3 months of doxycycline  Still complains of pain - palliative care following for assistance with pain management  Pt was discharged for [facility] hospice services due to suspected diversion of narcotics by her sister Jaycee.  Pt was sent to the ED at [facility] for an opinion regarding plan of care  Palliative care and hospice services are following  D/w palliative care service - changed methadone to fentanyl patch to assist with discharge planning to a facility  Pt reports adequate pain relief at this time  Seen by neuropsychology - pt does not have capacity  PT is not appropriate for PT/OT due to her osteomyelitis  Discharge planning to SNF for long term care, may need a guardian if there are no willing family members.

## 2024-10-14 NOTE — DISCHARGE INSTR - OTHER ORDERS
Skin Care orders:  1-Hydraguard to sacrum, buttocks and heels BID and PRN  2-EHOB  cushion when out of bed in chair.  3-Moisturize skin daily with skin nourishing cream  4-Elevate heels to offload pressure.  5-Turn/reposition q2h for pressure re-distribution on skin

## 2024-10-14 NOTE — PLAN OF CARE
Problem: PAIN - ADULT  Goal: Verbalizes/displays adequate comfort level or baseline comfort level  Description: Interventions:  - Encourage patient to monitor pain and request assistance  - Assess pain using appropriate pain scale  - Administer analgesics based on type and severity of pain and evaluate response  - Implement non-pharmacological measures as appropriate and evaluate response  - Consider cultural and social influences on pain and pain management  - Notify physician/advanced practitioner if interventions unsuccessful or patient reports new pain  Outcome: Not Progressing     Problem: SAFETY ADULT  Goal: Patient will remain free of falls  Description: INTERVENTIONS:  - Educate patient/family on patient safety including physical limitations  - Instruct patient to call for assistance with activity   - Consult OT/PT to assist with strengthening/mobility   - Keep Call bell within reach  - Keep bed low and locked with side rails adjusted as appropriate  - Keep care items and personal belongings within reach  - Initiate and maintain comfort rounds    - Apply yellow socks and bracelet for high fall risk patients  - Consider moving patient to room near nurses station  Outcome: Not Progressing  Goal: Maintain or return to baseline ADL function  Description: INTERVENTIONS:  -  Assess patient's ability to carry out ADLs; assess patient's baseline for ADL function and identify physical deficits which impact ability to perform ADLs (bathing, care of mouth/teeth, toileting, grooming, dressing, etc.)  - Assess/evaluate cause of self-care deficits   - Assess range of motion  - Assess patient's mobility; develop plan if impaired  - Assess patient's need for assistive devices and provide as appropriate  - Encourage maximum independence but intervene and supervise when necessary  - Involve family in performance of ADLs  - Assess for home care needs following discharge   - Consider OT consult to assist with ADL evaluation  and planning for discharge  - Provide patient education as appropriate  Outcome: Not Progressing  Goal: Maintains/Returns to pre admission functional level  Description: INTERVENTIONS:  - Perform AM-PAC 6 Click Basic Mobility/ Daily Activity assessment daily.  - Set and communicate daily mobility goal to care team and patient/family/caregiver.   - Collaborate with rehabilitation services on mobility goals if consulted    - Out of bed for toileting  - Record patient progress and toleration of activity level   Outcome: Not Progressing     Problem: Prexisting or High Potential for Compromised Skin Integrity  Goal: Skin integrity is maintained or improved  Description: INTERVENTIONS:  - Identify patients at risk for skin breakdown  - Assess and monitor skin integrity  - Assess and monitor nutrition and hydration status  - Monitor labs   - Assess for incontinence   - Turn and reposition patient  - Assist with mobility/ambulation  - Relieve pressure over bony prominences  - Avoid friction and shearing  - Provide appropriate hygiene as needed including keeping skin clean and dry  - Evaluate need for skin moisturizer/barrier cream  - Collaborate with interdisciplinary team   - Patient/family teaching  - Consider wound care consult   Outcome: Progressing     Problem: Nutrition/Hydration-ADULT  Goal: Nutrient/Hydration intake appropriate for improving, restoring or maintaining nutritional needs  Description: Monitor and assess patient's nutrition/hydration status for malnutrition. Collaborate with interdisciplinary team and initiate plan and interventions as ordered.  Monitor patient's weight and dietary intake as ordered or per policy. Utilize nutrition screening tool and intervene as necessary. Determine patient's food preferences and provide high-protein, high-caloric foods as appropriate.     INTERVENTIONS:  - Monitor oral intake, urinary output, labs, and treatment plans  - Assess nutrition and hydration status and  recommend course of action  - Evaluate amount of meals eaten  - Assist patient with eating if necessary   - Allow adequate time for meals  - Recommend/ encourage appropriate diets, oral nutritional supplements, and vitamin/mineral supplements  - Order, calculate, and assess calorie counts as needed  - Recommend, monitor, and adjust tube feedings and TPN/PPN based on assessed needs  - Assess need for intravenous fluids  - Provide specific nutrition/hydration education as appropriate  - Include patient/family/caregiver in decisions related to nutrition  Outcome: Progressing

## 2024-10-14 NOTE — PLAN OF CARE
Problem: PAIN - ADULT  Goal: Verbalizes/displays adequate comfort level or baseline comfort level  Description: Interventions:  - Encourage patient to monitor pain and request assistance  - Assess pain using appropriate pain scale  - Administer analgesics based on type and severity of pain and evaluate response  - Implement non-pharmacological measures as appropriate and evaluate response  - Consider cultural and social influences on pain and pain management  - Notify physician/advanced practitioner if interventions unsuccessful or patient reports new pain  Outcome: Not Progressing     Problem: SAFETY ADULT  Goal: Patient will remain free of falls  Description: INTERVENTIONS:  - Educate patient/family on patient safety including physical limitations  - Instruct patient to call for assistance with activity   - Consult OT/PT to assist with strengthening/mobility   - Keep Call bell within reach  - Keep bed low and locked with side rails adjusted as appropriate  - Keep care items and personal belongings within reach  - Initiate and maintain comfort rounds  - Make Fall Risk Sign visible to staff    - Apply yellow socks and bracelet for high fall risk patients  - Consider moving patient to room near nurses station  Outcome: Not Progressing  Goal: Maintain or return to baseline ADL function  Description: INTERVENTIONS:  -  Assess patient's ability to carry out ADLs; assess patient's baseline for ADL function and identify physical deficits which impact ability to perform ADLs (bathing, care of mouth/teeth, toileting, grooming, dressing, etc.)  - Assess/evaluate cause of self-care deficits   - Assess range of motion  - Assess patient's mobility; develop plan if impaired  - Assess patient's need for assistive devices and provide as appropriate  - Encourage maximum independence but intervene and supervise when necessary  - Involve family in performance of ADLs  - Assess for home care needs following discharge   - Consider OT  consult to assist with ADL evaluation and planning for discharge  - Provide patient education as appropriate  Outcome: Not Progressing  Goal: Maintains/Returns to pre admission functional level  Description: INTERVENTIONS:  - Perform AM-PAC 6 Click Basic Mobility/ Daily Activity assessment daily.  - Set and communicate daily mobility goal to care team and   - Record patient progress and toleration of activity level   Outcome: Not Progressing

## 2024-10-14 NOTE — PROGRESS NOTES
Pt crying most of morning.  Restless, not able to console.  1mg ativan given ivp.  I will monitor closely

## 2024-10-15 PROCEDURE — 99233 SBSQ HOSP IP/OBS HIGH 50: CPT | Performed by: SURGERY

## 2024-10-15 PROCEDURE — 99233 SBSQ HOSP IP/OBS HIGH 50: CPT | Performed by: INTERNAL MEDICINE

## 2024-10-15 RX ORDER — POLYETHYLENE GLYCOL 3350 17 G/17G
17 POWDER, FOR SOLUTION ORAL DAILY PRN
Status: DISCONTINUED | OUTPATIENT
Start: 2024-10-15 | End: 2024-10-28 | Stop reason: HOSPADM

## 2024-10-15 RX ORDER — SENNOSIDES 8.6 MG
1 TABLET ORAL
Status: DISCONTINUED | OUTPATIENT
Start: 2024-10-15 | End: 2024-10-21

## 2024-10-15 RX ADMIN — HALOPERIDOL 1 MG: 1 TABLET ORAL at 01:19

## 2024-10-15 RX ADMIN — GABAPENTIN 250 MG: 250 SOLUTION ORAL at 22:05

## 2024-10-15 RX ADMIN — METHOCARBAMOL 500 MG: 500 TABLET ORAL at 22:05

## 2024-10-15 RX ADMIN — GABAPENTIN 250 MG: 250 SOLUTION ORAL at 10:14

## 2024-10-15 RX ADMIN — NYSTATIN: 100000 CREAM TOPICAL at 10:10

## 2024-10-15 RX ADMIN — OXYCODONE HYDROCHLORIDE 10 MG: 10 TABLET ORAL at 13:03

## 2024-10-15 RX ADMIN — CARBAMIDE PEROXIDE 6.5% 5 DROP: 6.5 LIQUID AURICULAR (OTIC) at 17:38

## 2024-10-15 RX ADMIN — METHOCARBAMOL 500 MG: 500 TABLET ORAL at 13:03

## 2024-10-15 RX ADMIN — NYSTATIN: 100000 CREAM TOPICAL at 17:37

## 2024-10-15 RX ADMIN — HYDROMORPHONE HYDROCHLORIDE 0.5 MG: 1 INJECTION, SOLUTION INTRAMUSCULAR; INTRAVENOUS; SUBCUTANEOUS at 22:12

## 2024-10-15 RX ADMIN — ENOXAPARIN SODIUM 40 MG: 40 INJECTION SUBCUTANEOUS at 10:09

## 2024-10-15 RX ADMIN — METHOCARBAMOL 500 MG: 500 TABLET ORAL at 10:10

## 2024-10-15 RX ADMIN — HYDROMORPHONE HYDROCHLORIDE 0.5 MG: 1 INJECTION, SOLUTION INTRAMUSCULAR; INTRAVENOUS; SUBCUTANEOUS at 10:06

## 2024-10-15 RX ADMIN — CLONAZEPAM 1 MG: 1 TABLET ORAL at 22:05

## 2024-10-15 RX ADMIN — METHOCARBAMOL 500 MG: 500 TABLET ORAL at 17:36

## 2024-10-15 RX ADMIN — ACETAMINOPHEN 320 MG: 650 SUSPENSION ORAL at 13:03

## 2024-10-15 RX ADMIN — LORATADINE 10 MG: 10 TABLET ORAL at 10:09

## 2024-10-15 RX ADMIN — CARBAMIDE PEROXIDE 6.5% 5 DROP: 6.5 LIQUID AURICULAR (OTIC) at 10:11

## 2024-10-15 RX ADMIN — OXYCODONE HYDROCHLORIDE 10 MG: 10 TABLET ORAL at 17:36

## 2024-10-15 RX ADMIN — ACETAMINOPHEN 320 MG: 650 SUSPENSION ORAL at 10:09

## 2024-10-15 RX ADMIN — ACETAMINOPHEN 320 MG: 650 SUSPENSION ORAL at 17:36

## 2024-10-15 RX ADMIN — VENLAFAXINE HYDROCHLORIDE 225 MG: 150 CAPSULE, EXTENDED RELEASE ORAL at 10:11

## 2024-10-15 RX ADMIN — QUETIAPINE 150 MG: 150 TABLET, FILM COATED, EXTENDED RELEASE ORAL at 22:05

## 2024-10-15 RX ADMIN — BUPROPION HYDROCHLORIDE 150 MG: 150 TABLET, EXTENDED RELEASE ORAL at 10:09

## 2024-10-15 RX ADMIN — SENNOSIDES 8.6 MG: 8.6 TABLET, FILM COATED ORAL at 22:05

## 2024-10-15 RX ADMIN — ACETAMINOPHEN 320 MG: 650 SUSPENSION ORAL at 22:04

## 2024-10-15 RX ADMIN — CLONAZEPAM 1 MG: 1 TABLET ORAL at 10:10

## 2024-10-15 RX ADMIN — CLONAZEPAM 1 MG: 1 TABLET ORAL at 17:36

## 2024-10-15 NOTE — PROGRESS NOTES
Pastoral Care Progress Note             10/15/24 1500   Clinical Encounter Type   Visited With Patient and family together   Routine Visit Follow-up   Mu-ism Encounters   Mu-ism Needs Prayer      met pt for a follow-up visit with the pt and her mother. Pt emotionally sensitive and teared up at different times.It was learn that pt had been independent prier this stay. Mother reported feeling over whelmed with caring for her disable  and now this.  reframed love as giving even when it hurts and prayed with and for both mother and pt.  will continue check-in and remains available.

## 2024-10-15 NOTE — PROGRESS NOTES
Progress Note - Hospitalist   Name: Alyssa Madrigal 64 y.o. female I MRN: [id number]  Unit/Bed#: [facility] 815-01 I Date of Admission: 10/8/2024   Date of Service: 10/15/2024 I Hospital Day: 7     Assessment & Plan  Osteomyelitis (HCC)  MRI in 7/2024   Infectious/inflammatory changes from the occiput to cervical spine as   discussed above. Likely improved fluid collection in the left posterior   paraspinal soft tissues at C1-2 level. Otherwise no significant change from   06/29/2023 including epidural soft tissue thickening and enhancement and fluid   collection along the right aspect of C1-C2 joint and prevertebral space.     Patient went to hospice and completed 3 months of doxycycline  Still complains of pain - palliative care following for assistance with pain management  Pt was discharged for [facility] hospice services due to suspected diversion of narcotics by her sister Jaycee.  Pt was sent to the ED at [facility] for an opinion regarding plan of care  Palliative care and hospice services are following  palliative care service following - changed methadone to fentanyl patch to assist with discharge planning to a facility  Pt reports adequate pain relief at this time  Seen by neuropsychology - pt does not have capacity  PT is not appropriate for PT/OT due to her osteomyelitis  Discharge planning to SNF for long term care, may need a guardian if there are no willing family members.  Controlled substance agreement broken  Plan noted above  COPD (chronic obstructive pulmonary disease) with chronic bronchitis (HCC)  Continue albuterol  Major depressive disorder, recurrent episode with anxious distress (HCC)  Continue venlaflaxine  IVDU (intravenous drug user)  Noted in history  Fall  Had a fall out of bed last night on 10/11. CT scans and Xrs are without any new traumatic injury  Fall precautions  Continue analgesics  Post laminectomy syndrome    Posttraumatic stress disorder    Palliative care by specialist    Armando  dyskinesia    VTE Pharmacologic Prophylaxis:   Pharmacologic: Enoxaparin (Lovenox)  Mechanical VTE Prophylaxis in Place: No    Patient Centered Rounds: I have performed bedside rounds with nursing staff today.        Time Spent for Care: 1 hour.  More than 50% of total time spent on counseling and coordination of care as described above.    Current Length of Stay: 7 day(s)    Current Patient Status: Inpatient     Code Status: Level 3 - DNAR and DNI      Subjective:   nad    Objective:     Vitals:   Temp (24hrs), Av.7 °F (37.1 °C), Min:98.4 °F (36.9 °C), Max:99 °F (37.2 °C)    Temp:  [98.4 °F (36.9 °C)-99 °F (37.2 °C)] 99 °F (37.2 °C)  HR:  [90-92] 90  Resp:  [17-20] 17  BP: (136-140)/(92-97) 138/92  SpO2:  [95 %-96 %] 95 %  Body mass index is 20.17 kg/m².     Input and Output Summary (last 24 hours):       Intake/Output Summary (Last 24 hours) at 10/15/2024 0942  Last data filed at 10/15/2024 0601  Gross per 24 hour   Intake 236 ml   Output 875 ml   Net -639 ml       Physical Exam:     Physical Exam  HENT:      Head: Normocephalic and atraumatic.   Cardiovascular:      Rate and Rhythm: Normal rate and regular rhythm.   Pulmonary:      Effort: Pulmonary effort is normal.      Breath sounds: Normal breath sounds.   Neurological:      General: No focal deficit present.      Mental Status: She is oriented to person, place, and time.         Additional Data:     Labs:    Results from last 7 days   Lab Units 10/09/24  0417 10/08/24  2053   WBC Thousand/uL  --  10.62*   HEMOGLOBIN g/dL  --  13.7   HEMATOCRIT %  --  45.4   PLATELETS Thousands/uL 365 362   SEGS PCT %  --  61   LYMPHO PCT %  --  23   MONO PCT %  --  9   EOS PCT %  --  5     Results from last 7 days   Lab Units 10/09/24  0736   POTASSIUM mmol/L 3.3*   CHLORIDE mmol/L 104   CO2 mmol/L 25   BUN mg/dL 23   CREATININE mg/dL 0.81   CALCIUM mg/dL 8.8   ALK PHOS U/L 123*   ALT U/L 12   AST U/L 13             Recent Cultures (last 7 days):     Results from last 7  days   Lab Units 10/09/24  0000 10/08/24  2053   BLOOD CULTURE  No Growth After 5 Days. No Growth After 5 Days.       Last 24 Hours Medication List:   Current Facility-Administered Medications   Medication Dose Route Frequency Provider Last Rate    acetaminophen  320 mg Oral 4x Daily (with meals and at bedtime) Edenilson Joshi MD      albuterol  2 puff Inhalation Q4H PRN Rahul Christie MD      buPROPion  150 mg Oral Daily Myra George MD      carbamide peroxide  5 drop Left Ear BID Victorino Thorne MD      clonazePAM  1 mg Oral TID Edenilson Joshi MD      enoxaparin  40 mg Subcutaneous Daily Rahul Christie MD      fentaNYL  25 mcg Transdermal Q72H Nona Guajardo MD      gabapentin  250 mg Oral BID Myra George MD      haloperidol  1 mg Oral BID PRN Rahul Christie MD      HYDROmorphone  0.5 mg Intravenous Q3H PRN Edenilson Joshi MD      loratadine  10 mg Oral Daily Rahul Christie MD      LORazepam  1 mg Intravenous Q6H PRN Edenilson Joshi MD      LORazepam  2 mg Intravenous Q10 Min PRN Edenilson Joshi MD      methocarbamol  500 mg Oral 4x Daily (with meals and at bedtime) Edenilson Joshi MD      naloxone  2 mg Nasal Daily PRN Nona Guajardo MD      nystatin   Topical BID Victorino Thorne MD      ondansetron  4 mg Intravenous Q6H PRN Rahul Christie MD      oxyCODONE  10 mg Oral Q3H PRN Edenilson Joshi MD      oxyCODONE  5 mg Oral Q3H PRN Rahul Christie MD      QUEtiapine  150 mg Oral HS Rahul Christie MD      venlafaxine  225 mg Oral Daily Rahul Christie MD          Today, Patient Was Seen By: Estee Asencio DO    ** Please Note: Dictation voice to text software may have been used in the creation of this document. **

## 2024-10-15 NOTE — ASSESSMENT & PLAN NOTE
MRI in 7/2024   Infectious/inflammatory changes from the occiput to cervical spine as   discussed above. Likely improved fluid collection in the left posterior   paraspinal soft tissues at C1-2 level. Otherwise no significant change from   06/29/2023 including epidural soft tissue thickening and enhancement and fluid   collection along the right aspect of C1-C2 joint and prevertebral space.     Patient went to hospice and completed 3 months of doxycycline  Still complains of pain - palliative care following for assistance with pain management  Pt was discharged for [facility] hospice services due to suspected diversion of narcotics by her sister Jaycee.  Pt was sent to the ED at [facility] for an opinion regarding plan of care  Palliative care and hospice services are following  palliative care service following - changed methadone to fentanyl patch to assist with discharge planning to a facility  Pt reports adequate pain relief at this time  Seen by neuropsychology - pt does not have capacity  PT is not appropriate for PT/OT due to her osteomyelitis  Discharge planning to SNF for long term care, may need a guardian if there are no willing family members.   31-Oct-2019 15:46

## 2024-10-15 NOTE — PLAN OF CARE
Problem: PAIN - ADULT  Goal: Verbalizes/displays adequate comfort level or baseline comfort level  Description: Interventions:  - Encourage patient to monitor pain and request assistance  - Assess pain using appropriate pain scale  - Administer analgesics based on type and severity of pain and evaluate response  - Implement non-pharmacological measures as appropriate and evaluate response  - Consider cultural and social influences on pain and pain management  - Notify physician/advanced practitioner if interventions unsuccessful or patient reports new pain  Outcome: Progressing     Problem: INFECTION - ADULT  Goal: Absence or prevention of progression during hospitalization  Description: INTERVENTIONS:  - Assess and monitor for signs and symptoms of infection  - Monitor lab/diagnostic results  - Monitor all insertion sites, i.e. indwelling lines, tubes, and drains  - Monitor endotracheal if appropriate and nasal secretions for changes in amount and color  - [place] appropriate cooling/warming therapies per order  - Administer medications as ordered  - Instruct and encourage patient and family to use good hand hygiene technique  - Identify and instruct in appropriate isolation precautions for identified infection/condition  Outcome: Progressing  Goal: Absence of fever/infection during neutropenic period  Description: INTERVENTIONS:  - Monitor WBC    Outcome: Progressing     Problem: SAFETY ADULT  Goal: Patient will remain free of falls  Description: INTERVENTIONS:  - Educate patient/family on patient safety including physical limitations  - Instruct patient to call for assistance with activity   - Consult OT/PT to assist with strengthening/mobility   - Keep Call bell within reach  - Keep bed low and locked with side rails adjusted as appropriate  - Keep care items and personal belongings within reach  - Initiate and maintain comfort rounds  - Apply yellow socks and bracelet for high fall risk patients  - Consider  moving patient to room near nurses station  Outcome: Progressing  Goal: Maintain or return to baseline ADL function  Description: INTERVENTIONS:  -  Assess patient's ability to carry out ADLs; assess patient's baseline for ADL function and identify physical deficits which impact ability to perform ADLs (bathing, care of mouth/teeth, toileting, grooming, dressing, etc.)  - Assess/evaluate cause of self-care deficits   - Assess range of motion  - Assess patient's mobility; develop plan if impaired  - Assess patient's need for assistive devices and provide as appropriate  - Encourage maximum independence but intervene and supervise when necessary  - Involve family in performance of ADLs  - Assess for home care needs following discharge   - Consider OT consult to assist with ADL evaluation and planning for discharge  - Provide patient education as appropriate  Outcome: Progressing  Goal: Maintains/Returns to pre admission functional level  Description: INTERVENTIONS:  - Perform AM-PAC 6 Click Basic Mobility/ Daily Activity assessment daily.  - Set and communicate daily mobility goal to care team and patient/family/caregiver.   - Collaborate with rehabilitation services on mobility goals if consulted  - Perform Range of Motion 3 times a day.  - Reposition patient every 2 hours.  - Dangle patient 3 times a day  - Stand patient 3 times a day  - Ambulate patient 3 times a day  - Out of bed to chair 3 times a day   - Out of bed for meals 3 times a day  - Out of bed for toileting  - Record patient progress and toleration of activity level   Outcome: Progressing     Problem: DISCHARGE PLANNING  Goal: Discharge to home or other facility with appropriate resources  Description: INTERVENTIONS:  - Identify barriers to discharge w/patient and caregiver  - Arrange for needed discharge resources and transportation as appropriate  - Identify discharge learning needs (meds, wound care, etc.)  - Arrange for interpretive services to  assist at discharge as needed  - Refer to Case Management Department for coordinating discharge planning if the patient needs post-hospital services based on physician/advanced practitioner order or complex needs related to functional status, cognitive ability, or social support system  Outcome: Progressing     Problem: Knowledge Deficit  Goal: Patient/family/caregiver demonstrates understanding of disease process, treatment plan, medications, and discharge instructions  Description: Complete learning assessment and assess knowledge base.  Interventions:  - Provide teaching at level of understanding  - Provide teaching via preferred learning methods  Outcome: Progressing     Problem: Prexisting or High Potential for Compromised Skin Integrity  Goal: Skin integrity is maintained or improved  Description: INTERVENTIONS:  - Identify patients at risk for skin breakdown  - Assess and monitor skin integrity  - Assess and monitor nutrition and hydration status  - Monitor labs   - Assess for incontinence   - Turn and reposition patient  - Assist with mobility/ambulation  - Relieve pressure over bony prominences  - Avoid friction and shearing  - Provide appropriate hygiene as needed including keeping skin clean and dry  - Evaluate need for skin moisturizer/barrier cream  - Collaborate with interdisciplinary team   - Patient/family teaching  - Consider wound care consult   Outcome: Progressing     Problem: Nutrition/Hydration-ADULT  Goal: Nutrient/Hydration intake appropriate for improving, restoring or maintaining nutritional needs  Description: Monitor and assess patient's nutrition/hydration status for malnutrition. Collaborate with interdisciplinary team and initiate plan and interventions as ordered.  Monitor patient's weight and dietary intake as ordered or per policy. Utilize nutrition screening tool and intervene as necessary. Determine patient's food preferences and provide high-protein, high-caloric foods as  appropriate.     INTERVENTIONS:  - Monitor oral intake, urinary output, labs, and treatment plans  - Assess nutrition and hydration status and recommend course of action  - Evaluate amount of meals eaten  - Assist patient with eating if necessary   - Allow adequate time for meals  - Recommend/ encourage appropriate diets, oral nutritional supplements, and vitamin/mineral supplements  - Order, calculate, and assess calorie counts as needed  - Recommend, monitor, and adjust tube feedings and TPN/PPN based on assessed needs  - Assess need for intravenous fluids  - Provide specific nutrition/hydration education as appropriate  - Include patient/family/caregiver in decisions related to nutrition  Outcome: Progressing     Problem: SAFETY,RESTRAINT: NV/NON-SELF DESTRUCTIVE BEHAVIOR  Goal: Remains free of harm/injury (restraint for non violent/non self-detsructive behavior)  Description: INTERVENTIONS:  - Instruct patient/family regarding restraint use   - Assess and monitor physiologic and psychological status   - Provide interventions and comfort measures to meet assessed patient needs   - Identify and implement measures to help patient regain control  - Assess readiness for release of restraint   Outcome: Progressing  Goal: Returns to optimal restraint-free functioning  Description: INTERVENTIONS:  - Assess the patient's behavior and symptoms that indicate continued need for restraint  - Identify and implement measures to help patient regain control  - Assess readiness for release of restraint   Outcome: Progressing

## 2024-10-15 NOTE — PLAN OF CARE
Problem: PAIN - ADULT  Goal: Verbalizes/displays adequate comfort level or baseline comfort level  Description: Interventions:  - Encourage patient to monitor pain and request assistance  - Assess pain using appropriate pain scale  - Administer analgesics based on type and severity of pain and evaluate response  - Implement non-pharmacological measures as appropriate and evaluate response  - Consider cultural and social influences on pain and pain management  - Notify physician/advanced practitioner if interventions unsuccessful or patient reports new pain  Outcome: Progressing     Problem: INFECTION - ADULT  Goal: Absence or prevention of progression during hospitalization  Description: INTERVENTIONS:  - Assess and monitor for signs and symptoms of infection  - Monitor lab/diagnostic results  - Monitor all insertion sites, i.e. indwelling lines, tubes, and drains  - Monitor endotracheal if appropriate and nasal secretions for changes in amount and color  - [place] appropriate cooling/warming therapies per order  - Administer medications as ordered  - Instruct and encourage patient and family to use good hand hygiene technique  - Identify and instruct in appropriate isolation precautions for identified infection/condition  Outcome: Progressing  Goal: Absence of fever/infection during neutropenic period  Description: INTERVENTIONS:  - Monitor WBC    Outcome: Progressing     Problem: SAFETY ADULT  Goal: Patient will remain free of falls  Description: INTERVENTIONS:  - Educate patient/family on patient safety including physical limitations  - Instruct patient to call for assistance with activity   - Consult OT/PT to assist with strengthening/mobility   - Keep Call bell within reach  - Keep bed low and locked with side rails adjusted as appropriate  - Keep care items and personal belongings within reach  - Initiate and maintain comfort rounds  - Make Fall Risk Sign visible to staff  - Offer Toileting every 2 Hours,  in advance of need  - Initiate/Maintain alarm  - Obtain necessary fall risk management equipment:   - Apply yellow socks and bracelet for high fall risk patients  - Consider moving patient to room near nurses station  Outcome: Progressing  Goal: Maintain or return to baseline ADL function  Description: INTERVENTIONS:  -  Assess patient's ability to carry out ADLs; assess patient's baseline for ADL function and identify physical deficits which impact ability to perform ADLs (bathing, care of mouth/teeth, toileting, grooming, dressing, etc.)  - Assess/evaluate cause of self-care deficits   - Assess range of motion  - Assess patient's mobility; develop plan if impaired  - Assess patient's need for assistive devices and provide as appropriate  - Encourage maximum independence but intervene and supervise when necessary  - Involve family in performance of ADLs  - Assess for home care needs following discharge   - Consider OT consult to assist with ADL evaluation and planning for discharge  - Provide patient education as appropriate  Outcome: Progressing  Goal: Maintains/Returns to pre admission functional level  Description: INTERVENTIONS:  - Perform AM-PAC 6 Click Basic Mobility/ Daily Activity assessment daily.  - Set and communicate daily mobility goal to care team and patient/family/caregiver.   - Collaborate with rehabilitation services on mobility goals if consulted  - Reposition patient every 2 hours.  -  - Out of bed for toileting  - Record patient progress and toleration of activity level   Outcome: Progressing

## 2024-10-15 NOTE — PROGRESS NOTES
"Progress Note - Palliative & Supportive Care  Alyssa Madrigal  64 y.o.  female  PPHP [id number]/PPHP [id number]   MRN: [id number]  Encounter: [id number]     Assessment/Plan:  Osteomyelitis  COPD  Tardive dyskinesia  Post laminectomy syndrome  Major depressive disorder  Anxiety  Constipation   H/o IVDU  Palliative care encounter  Goals of care  -continue current medical care  -in process of guardianship, d/w CM  -pain regimen   -tylenol susp 320mg po 4x/day melonie   -Robaxin 500mg po 4x/day   -fentanyl 25mcg/hr TD patch q72h, due for change tomorrow   -oxyIR 5-10mg po q3h prn mod-severe pain   -hydromorphone 0.5mg IV q3h prn bt pain   -prn narcan  -bowel regimen   -start senna 8.6mg nightly]   -start prn miralax daily  -depression/anxiety/agitation regimen   -bupropion reduced to 150mg yesterday 10/14   -gabapentin 250mg soln bid added yesterday 10/14   -seroquel XR 150mg po qhs   -effexor XR 225mg po daily   -clonazepam 1mg tid   -lorazepam 1mg IV q6h prn anxiety   -lorazepam 2mg IV q10min prn seizures   -haldol 1mg po bid prn agitation  -d/w RN, primary  -will follow-up later in week; please contact palliative care with questions/concerns      Code status: Level 3, DNAR/DNI    Subjective:  Patient seen and examined. Alert and interactive today, calm. Nursing at bedside. \"Today is a good day\". Denies pain. Feels good. Tolerating po, meds. Zulema intact, d/w nursing about trial off. No BM documented.    Received:  Fentanyl 25mcg/hr patch + oxyIR 25mg po/last 24h = 81mg approx OME   2x doses of ativan, 1x dose of haldol since yesterday    Last BM: No BM documented    Medications    Current Facility-Administered Medications:     acetaminophen (TYLENOL) oral suspension 320 mg, 320 mg, Oral, 4x Daily (with meals and at bedtime), Edenilson Joshi MD, 320 mg at 10/14/24 2140    albuterol (PROVENTIL HFA,VENTOLIN HFA) inhaler 2 puff, 2 puff, Inhalation, Q4H PRN, Rahul Christie MD    buPROPion (WELLBUTRIN XL) 24 hr tablet 150 mg, " 150 mg, Oral, Daily, Myra George MD    carbamide peroxide (DEBROX) 6.5 % otic solution 5 drop, 5 drop, Left Ear, BID, Victorino Thorne MD, 5 drop at 10/14/24 2148    clonazePAM (KlonoPIN) tablet 1 mg, 1 mg, Oral, TID, Edenilson Joshi MD, 1 mg at 10/14/24 2144    enoxaparin (LOVENOX) subcutaneous injection 40 mg, 40 mg, Subcutaneous, Daily, Rahul Christie MD, 40 mg at 10/14/24 1022    fentaNYL (DURAGESIC) 25 mcg/hr TD 72 hr patch 25 mcg, 25 mcg, Transdermal, Q72H, Nona Guajardo MD, 25 mcg at 10/13/24 2140    gabapentin (NEURONTIN) oral solution 250 mg, 250 mg, Oral, BID, Myra George MD, 250 mg at 10/14/24 1315    haloperidol (HALDOL) tablet 1 mg, 1 mg, Oral, BID PRN, Rahul Christie MD, 1 mg at 10/15/24 0119    HYDROmorphone (DILAUDID) injection 0.5 mg, 0.5 mg, Intravenous, Q3H PRN, Edenilson Joshi MD, 0.5 mg at 10/12/24 1229    loratadine (CLARITIN) tablet 10 mg, 10 mg, Oral, Daily, Rahul Christie MD, 10 mg at 10/14/24 1022    LORazepam (ATIVAN) injection 1 mg, 1 mg, Intravenous, Q6H PRN, Edenilson Joshi MD, 1 mg at 10/14/24 1142    LORazepam (ATIVAN) injection 2 mg, 2 mg, Intravenous, Q10 Min PRN, Edenilson Joshi MD    methocarbamol (ROBAXIN) tablet 500 mg, 500 mg, Oral, 4x Daily (with meals and at bedtime), Edenilson Joshi MD, 500 mg at 10/14/24 2144    naloxone (NARCAN) intranasal 2 mg, 2 mg, Nasal, Daily PRN, Nona Guajardo MD    nystatin (MYCOSTATIN) cream, , Topical, BID, Victorino Thorne MD, Given at 10/14/24 1024    ondansetron (ZOFRAN) injection 4 mg, 4 mg, Intravenous, Q6H PRN, Rahul Christie MD    oxyCODONE (ROXICODONE) immediate release tablet 10 mg, 10 mg, Oral, Q3H PRN, Edenilson Joshi MD, 10 mg at 10/14/24 1925    oxyCODONE (ROXICODONE) IR tablet 5 mg, 5 mg, Oral, Q3H PRN, Rahul Christie MD, 5 mg at 10/14/24 2144    QUEtiapine (SEROquel XR) 24 hr tablet 150 mg, 150 mg, Oral, HS, Rahul Christie MD, 150 mg at 10/14/24  "2147    venlafaxine (EFFEXOR-XR) 24 hr capsule 225 mg, 225 mg, Oral, Daily, Rahul Christie MD, 225 mg at 10/14/24 1024    Objective:  /92   Pulse 90   Temp 99 °F (37.2 °C)   Resp 17   Ht 5' 4\" (1.626 m)   Wt 53.3 kg (117 lb 8.1 oz)   SpO2 95%   BMI 20.17 kg/m²   Physical Exam:  General: nad, pleasant and cooperative today  Neurological: aa oriented to self  Cardiovascular: reg  Respiratory: normal effort, no distress  Gastrointestinal: soft, nt, nd  Musculoskeletal: no edema  Skin: warm, dry  Psychiatric: calm, pleasantly confused; lacks capacity to make medical decsions    Lab Results: I have personally reviewed pertinent labs., CBC: No results found for: \"WBC\", \"HGB\", \"HCT\", \"MCV\", \"PLT\", \"ADJUSTEDWBC\", \"RBC\", \"MCH\", \"MCHC\", \"RDW\", \"MPV\", \"NRBC\", CMP: No results found for: \"SODIUM\", \"K\", \"CL\", \"CO2\", \"ANIONGAP\", \"BUN\", \"CREATININE\", \"GLUCOSE\", \"CALCIUM\", \"AST\", \"ALT\", \"ALKPHOS\", \"PROT\", \"BILITOT\", \"EGFR\", BMP:No results found for: \"SODIUM\", \"K\", \"CL\", \"CO2\", \"BUN\", \"CREATININE\", \"GLUC\", \"GLUF\", \"CALCIUM\", \"AGAP\", \"EGFR\", PT/PTT:No results found for: \"PT\", \"PTT\"    Counseling / Coordination of Care  Total floor / unit time spent today 25 minutes.  Greater than 50% of total time was spent with the patient and / or family counseling and / or coordinating of care. A description of the counseling / coordination of care: current condition, goals of care, symptom management, support.      Hilda Dougherty, DO  Palliative & Supportive Care   "

## 2024-10-16 PROCEDURE — 99233 SBSQ HOSP IP/OBS HIGH 50: CPT | Performed by: INTERNAL MEDICINE

## 2024-10-16 RX ADMIN — NYSTATIN: 100000 CREAM TOPICAL at 16:53

## 2024-10-16 RX ADMIN — OXYCODONE HYDROCHLORIDE 10 MG: 10 TABLET ORAL at 04:11

## 2024-10-16 RX ADMIN — CLONAZEPAM 1 MG: 1 TABLET ORAL at 08:29

## 2024-10-16 RX ADMIN — ACETAMINOPHEN 320 MG: 650 SUSPENSION ORAL at 08:28

## 2024-10-16 RX ADMIN — OXYCODONE HYDROCHLORIDE 10 MG: 10 TABLET ORAL at 08:29

## 2024-10-16 RX ADMIN — SENNOSIDES 8.6 MG: 8.6 TABLET, FILM COATED ORAL at 20:45

## 2024-10-16 RX ADMIN — ENOXAPARIN SODIUM 40 MG: 40 INJECTION SUBCUTANEOUS at 08:29

## 2024-10-16 RX ADMIN — OXYCODONE HYDROCHLORIDE 10 MG: 10 TABLET ORAL at 14:31

## 2024-10-16 RX ADMIN — ACETAMINOPHEN 320 MG: 650 SUSPENSION ORAL at 20:52

## 2024-10-16 RX ADMIN — HYDROMORPHONE HYDROCHLORIDE 0.5 MG: 1 INJECTION, SOLUTION INTRAMUSCULAR; INTRAVENOUS; SUBCUTANEOUS at 16:57

## 2024-10-16 RX ADMIN — VENLAFAXINE HYDROCHLORIDE 225 MG: 150 CAPSULE, EXTENDED RELEASE ORAL at 08:30

## 2024-10-16 RX ADMIN — OXYCODONE HYDROCHLORIDE 5 MG: 5 TABLET ORAL at 20:37

## 2024-10-16 RX ADMIN — METHOCARBAMOL 500 MG: 500 TABLET ORAL at 16:56

## 2024-10-16 RX ADMIN — NYSTATIN: 100000 CREAM TOPICAL at 08:30

## 2024-10-16 RX ADMIN — ACETAMINOPHEN 320 MG: 650 SUSPENSION ORAL at 11:49

## 2024-10-16 RX ADMIN — FENTANYL 25 MCG: 25 PATCH TRANSDERMAL at 20:38

## 2024-10-16 RX ADMIN — QUETIAPINE 150 MG: 150 TABLET, FILM COATED, EXTENDED RELEASE ORAL at 20:54

## 2024-10-16 RX ADMIN — CLONAZEPAM 1 MG: 1 TABLET ORAL at 20:45

## 2024-10-16 RX ADMIN — GABAPENTIN 250 MG: 250 SOLUTION ORAL at 08:33

## 2024-10-16 RX ADMIN — POLYETHYLENE GLYCOL 3350 17 G: 17 POWDER, FOR SOLUTION ORAL at 12:14

## 2024-10-16 RX ADMIN — GABAPENTIN 250 MG: 250 SOLUTION ORAL at 20:49

## 2024-10-16 RX ADMIN — METHOCARBAMOL 500 MG: 500 TABLET ORAL at 08:29

## 2024-10-16 RX ADMIN — LORAZEPAM 1 MG: 2 INJECTION INTRAMUSCULAR; INTRAVENOUS at 18:24

## 2024-10-16 RX ADMIN — METHOCARBAMOL 500 MG: 500 TABLET ORAL at 20:46

## 2024-10-16 RX ADMIN — LORATADINE 10 MG: 10 TABLET ORAL at 08:29

## 2024-10-16 RX ADMIN — ACETAMINOPHEN 320 MG: 650 SUSPENSION ORAL at 16:56

## 2024-10-16 RX ADMIN — HYDROMORPHONE HYDROCHLORIDE 0.5 MG: 1 INJECTION, SOLUTION INTRAMUSCULAR; INTRAVENOUS; SUBCUTANEOUS at 11:50

## 2024-10-16 RX ADMIN — CLONAZEPAM 1 MG: 1 TABLET ORAL at 16:56

## 2024-10-16 RX ADMIN — METHOCARBAMOL 500 MG: 500 TABLET ORAL at 11:49

## 2024-10-16 RX ADMIN — BUPROPION HYDROCHLORIDE 150 MG: 150 TABLET, EXTENDED RELEASE ORAL at 08:29

## 2024-10-16 RX ADMIN — CARBAMIDE PEROXIDE 6.5% 5 DROP: 6.5 LIQUID AURICULAR (OTIC) at 16:54

## 2024-10-16 NOTE — PLAN OF CARE
Problem: PAIN - ADULT  Goal: Verbalizes/displays adequate comfort level or baseline comfort level  Description: Interventions:  - Encourage patient to monitor pain and request assistance  - Assess pain using appropriate pain scale  - Administer analgesics based on type and severity of pain and evaluate response  - Implement non-pharmacological measures as appropriate and evaluate response  - Consider cultural and social influences on pain and pain management  - Notify physician/advanced practitioner if interventions unsuccessful or patient reports new pain  Outcome: Progressing     Problem: INFECTION - ADULT  Goal: Absence or prevention of progression during hospitalization  Description: INTERVENTIONS:  - Assess and monitor for signs and symptoms of infection  - Monitor lab/diagnostic results  - Monitor all insertion sites, i.e. indwelling lines, tubes, and drains  - Monitor endotracheal if appropriate and nasal secretions for changes in amount and color  - [place] appropriate cooling/warming therapies per order  - Administer medications as ordered  - Instruct and encourage patient and family to use good hand hygiene technique  - Identify and instruct in appropriate isolation precautions for identified infection/condition  Outcome: Progressing  Goal: Absence of fever/infection during neutropenic period  Description: INTERVENTIONS:  - Monitor WBC    Outcome: Progressing     Problem: SAFETY ADULT  Goal: Patient will remain free of falls  Description: INTERVENTIONS:  - Educate patient/family on patient safety including physical limitations  - Instruct patient to call for assistance with activity   - Consult OT/PT to assist with strengthening/mobility   - Keep Call bell within reach  - Keep bed low and locked with side rails adjusted as appropriate  - Keep care items and personal belongings within reach  - Initiate and maintain comfort rounds  - Make Fall Risk Sign visible to staff  - Offer Toileting every 2 Hours,  in advance of need  - Initiate/Maintain alarm  - Obtain necessary fall risk management equipment:   - Apply yellow socks and bracelet for high fall risk patients  - Consider moving patient to room near nurses station  Outcome: Progressing  Goal: Maintain or return to baseline ADL function  Description: INTERVENTIONS:  -  Assess patient's ability to carry out ADLs; assess patient's baseline for ADL function and identify physical deficits which impact ability to perform ADLs (bathing, care of mouth/teeth, toileting, grooming, dressing, etc.)  - Assess/evaluate cause of self-care deficits   - Assess range of motion  - Assess patient's mobility; develop plan if impaired  - Assess patient's need for assistive devices and provide as appropriate  - Encourage maximum independence but intervene and supervise when necessary  - Involve family in performance of ADLs  - Assess for home care needs following discharge   - Consider OT consult to assist with ADL evaluation and planning for discharge  - Provide patient education as appropriate  Outcome: Progressing  Goal: Maintains/Returns to pre admission functional level  Description: INTERVENTIONS:  - Perform AM-PAC 6 Click Basic Mobility/ Daily Activity assessment daily.  - Set and communicate daily mobility goal to care team and patient/family/caregiver.   - Collaborate with rehabilitation services on mobility goals if consulted  - Reposition patient every 2 hours.  - Out of bed for toileting  - Record patient progress and toleration of activity level   Outcome: Progressing

## 2024-10-16 NOTE — ASSESSMENT & PLAN NOTE
MRI in 7/2024   Infectious/inflammatory changes from the occiput to cervical spine as   discussed above. Likely improved fluid collection in the left posterior   paraspinal soft tissues at C1-2 level. Otherwise no significant change from   06/29/2023 including epidural soft tissue thickening and enhancement and fluid   collection along the right aspect of C1-C2 joint and prevertebral space.     Patient went to hospice and completed 3 months of doxycycline  Still complains of pain - palliative care following for assistance with pain management  Pt was discharged for [facility] hospice services due to suspected diversion of narcotics by her sister Jaycee.  Pt was sent to the ED at [facility] for an opinion regarding plan of care  Palliative care and hospice services are following  palliative care service following - changed methadone to fentanyl patch to assist with discharge planning to a facility  Pt reports adequate pain relief at this time  Seen by neuropsychology - pt does not have capacity  PT is not appropriate for PT/OT due to her osteomyelitis  Discharge planning to SNF for long term care, may need a guardian if there are no willing family members.

## 2024-10-16 NOTE — PROGRESS NOTES
Progress Note - Hospitalist   Name: Alyssa Madrigal 64 y.o. female I MRN: [id number]  Unit/Bed#: [facility] 815-01 I Date of Admission: 10/8/2024   Date of Service: 10/16/2024 I Hospital Day: 8     Assessment & Plan  Osteomyelitis (HCC)  MRI in 7/2024   Infectious/inflammatory changes from the occiput to cervical spine as   discussed above. Likely improved fluid collection in the left posterior   paraspinal soft tissues at C1-2 level. Otherwise no significant change from   06/29/2023 including epidural soft tissue thickening and enhancement and fluid   collection along the right aspect of C1-C2 joint and prevertebral space.     Patient went to hospice and completed 3 months of doxycycline  Still complains of pain - palliative care following for assistance with pain management  Pt was discharged for [facility] hospice services due to suspected diversion of narcotics by her sister Jaycee.  Pt was sent to the ED at [facility] for an opinion regarding plan of care  Palliative care and hospice services are following  palliative care service following - changed methadone to fentanyl patch to assist with discharge planning to a facility  Pt reports adequate pain relief at this time  Seen by neuropsychology - pt does not have capacity  PT is not appropriate for PT/OT due to her osteomyelitis  Discharge planning to SNF for long term care, may need a guardian if there are no willing family members.  Controlled substance agreement broken  Plan noted above  COPD (chronic obstructive pulmonary disease) with chronic bronchitis (HCC)  Continue albuterol  Major depressive disorder, recurrent episode with anxious distress (HCC)  Continue venlaflaxine  IVDU (intravenous drug user)  Noted in history  Fall  Had a fall out of bed last night on 10/11. CT scans and Xrs are without any new traumatic injury  Fall precautions  Continue analgesics  Post laminectomy syndrome    Posttraumatic stress disorder    Palliative care by specialist    Armando  "dyskinesia    VTE Pharmacologic Prophylaxis:   Pharmacologic: Enoxaparin (Lovenox)  Mechanical VTE Prophylaxis in Place: No    Patient Centered Rounds: I have performed bedside rounds with nursing staff today.      Time Spent for Care: 1 hour.  More than 50% of total time spent on counseling and coordination of care as described above.    Current Length of Stay: 8 day(s)    Current Patient Status: Inpatient     Code Status: Level 3 - DNAR and DNI      Subjective:   nad    Objective:     Vitals:   Temp (24hrs), Av.4 °F (36.9 °C), Min:97.9 °F (36.6 °C), Max:98.8 °F (37.1 °C)    Temp:  [97.9 °F (36.6 °C)-98.8 °F (37.1 °C)] 97.9 °F (36.6 °C)  HR:  [] 93  Resp:  [16-18] 16  BP: (130-133)/(88-92) 131/88  SpO2:  [95 %-97 %] 96 %  Body mass index is 20.17 kg/m².     Input and Output Summary (last 24 hours):       Intake/Output Summary (Last 24 hours) at 10/16/2024 1034  Last data filed at 10/16/2024 0849  Gross per 24 hour   Intake 440 ml   Output 400 ml   Net 40 ml       Physical Exam:     Physical Exam  Constitutional:       Appearance: Normal appearance.   HENT:      Head: Normocephalic and atraumatic.   Cardiovascular:      Rate and Rhythm: Normal rate and regular rhythm.   Pulmonary:      Effort: Pulmonary effort is normal.      Breath sounds: Normal breath sounds.   Abdominal:      General: Abdomen is flat.      Palpations: Abdomen is soft.   Musculoskeletal:         General: No swelling. Normal range of motion.   Neurological:      General: No focal deficit present.      Mental Status: She is alert and oriented to person, place, and time.   Psychiatric:         Mood and Affect: Mood normal.         Additional Data:     Labs:              Invalid input(s): \"LABALBU\"          Recent Cultures (last 7 days):           Last 24 Hours Medication List:   Current Facility-Administered Medications   Medication Dose Route Frequency Provider Last Rate    acetaminophen  320 mg Oral 4x Daily (with meals and at bedtime) " Edenilson Joshi MD      albuterol  2 puff Inhalation Q4H PRN Rahul Christie MD      buPROPion  150 mg Oral Daily Myra George MD      carbamide peroxide  5 drop Left Ear BID Victorino Thorne MD      clonazePAM  1 mg Oral TID Edenilson Joshi MD      enoxaparin  40 mg Subcutaneous Daily Rahul Christie MD      fentaNYL  25 mcg Transdermal Q72H Nona Guajardo MD      gabapentin  250 mg Oral BID Myra George MD      haloperidol  1 mg Oral BID PRN Rahul Christie MD      HYDROmorphone  0.5 mg Intravenous Q3H PRN Edenilson Joshi MD      loratadine  10 mg Oral Daily Rahul Christie MD      LORazepam  1 mg Intravenous Q6H PRN Edenilson Joshi MD      LORazepam  2 mg Intravenous Q10 Min PRN Edenilson Joshi MD      methocarbamol  500 mg Oral 4x Daily (with meals and at bedtime) Edenilson Joshi MD      naloxone  2 mg Nasal Daily PRN Nona Guajardo MD      nystatin   Topical BID Victorino Thorne MD      ondansetron  4 mg Intravenous Q6H PRN Rahul Christie MD      oxyCODONE  10 mg Oral Q3H PRN Edenilson Joshi MD      oxyCODONE  5 mg Oral Q3H PRN Rahul Christie MD      polyethylene glycol  17 g Oral Daily PRN Hilda Dougherty DO      QUEtiapine  150 mg Oral HS Rahul Christie MD      senna  1 tablet Oral HS Hidla Dougherty DO      venlafaxine  225 mg Oral Daily Rahul Christie MD          Today, Patient Was Seen By: Estee Asencio DO    ** Please Note: Dictation voice to text software may have been used in the creation of this document. **

## 2024-10-16 NOTE — SPEECH THERAPY NOTE
ST order discontinued. Patient requested soft solids due to dentition. Intake has been % across meals. The patient did not have any signs of aspiration. Continue mechanical soft diet. Re-consult ST as warranted.

## 2024-10-17 PROCEDURE — 99233 SBSQ HOSP IP/OBS HIGH 50: CPT | Performed by: INTERNAL MEDICINE

## 2024-10-17 RX ORDER — GABAPENTIN 250 MG/5ML
250 SOLUTION ORAL 3 TIMES DAILY
Status: DISCONTINUED | OUTPATIENT
Start: 2024-10-17 | End: 2024-10-28 | Stop reason: HOSPADM

## 2024-10-17 RX ORDER — FENTANYL 50 UG/1
50 PATCH TRANSDERMAL
Status: DISCONTINUED | OUTPATIENT
Start: 2024-10-17 | End: 2024-10-28 | Stop reason: HOSPADM

## 2024-10-17 RX ORDER — OXYCODONE HYDROCHLORIDE 5 MG/1
5 TABLET ORAL
Status: DISCONTINUED | OUTPATIENT
Start: 2024-10-17 | End: 2024-10-17

## 2024-10-17 RX ADMIN — CLONAZEPAM 1 MG: 1 TABLET ORAL at 21:04

## 2024-10-17 RX ADMIN — VENLAFAXINE HYDROCHLORIDE 225 MG: 150 CAPSULE, EXTENDED RELEASE ORAL at 08:48

## 2024-10-17 RX ADMIN — ACETAMINOPHEN 320 MG: 650 SUSPENSION ORAL at 17:35

## 2024-10-17 RX ADMIN — LORAZEPAM 1 MG: 2 INJECTION INTRAMUSCULAR; INTRAVENOUS at 21:05

## 2024-10-17 RX ADMIN — QUETIAPINE 150 MG: 150 TABLET, FILM COATED, EXTENDED RELEASE ORAL at 21:04

## 2024-10-17 RX ADMIN — HYDROMORPHONE HYDROCHLORIDE 0.5 MG: 1 INJECTION, SOLUTION INTRAMUSCULAR; INTRAVENOUS; SUBCUTANEOUS at 09:54

## 2024-10-17 RX ADMIN — ACETAMINOPHEN 320 MG: 650 SUSPENSION ORAL at 11:47

## 2024-10-17 RX ADMIN — OXYCODONE HYDROCHLORIDE 10 MG: 10 TABLET ORAL at 19:49

## 2024-10-17 RX ADMIN — HYDROMORPHONE HYDROCHLORIDE 0.5 MG: 1 INJECTION, SOLUTION INTRAMUSCULAR; INTRAVENOUS; SUBCUTANEOUS at 21:05

## 2024-10-17 RX ADMIN — ENOXAPARIN SODIUM 40 MG: 40 INJECTION SUBCUTANEOUS at 08:46

## 2024-10-17 RX ADMIN — GABAPENTIN 250 MG: 250 SOLUTION ORAL at 08:51

## 2024-10-17 RX ADMIN — OXYCODONE HYDROCHLORIDE 10 MG: 10 TABLET ORAL at 16:26

## 2024-10-17 RX ADMIN — FENTANYL 50 MCG: 50 PATCH, EXTENDED RELEASE TRANSDERMAL at 09:54

## 2024-10-17 RX ADMIN — SENNOSIDES 8.6 MG: 8.6 TABLET, FILM COATED ORAL at 21:04

## 2024-10-17 RX ADMIN — NYSTATIN: 100000 CREAM TOPICAL at 08:50

## 2024-10-17 RX ADMIN — OXYCODONE HYDROCHLORIDE 10 MG: 10 TABLET ORAL at 11:47

## 2024-10-17 RX ADMIN — METHOCARBAMOL 500 MG: 500 TABLET ORAL at 11:47

## 2024-10-17 RX ADMIN — ACETAMINOPHEN 320 MG: 650 SUSPENSION ORAL at 21:04

## 2024-10-17 RX ADMIN — ACETAMINOPHEN 320 MG: 650 SUSPENSION ORAL at 08:47

## 2024-10-17 RX ADMIN — CLONAZEPAM 1 MG: 1 TABLET ORAL at 08:46

## 2024-10-17 RX ADMIN — METHOCARBAMOL 500 MG: 500 TABLET ORAL at 21:05

## 2024-10-17 RX ADMIN — BUPROPION HYDROCHLORIDE 150 MG: 150 TABLET, EXTENDED RELEASE ORAL at 08:47

## 2024-10-17 RX ADMIN — GABAPENTIN 250 MG: 250 SOLUTION ORAL at 17:35

## 2024-10-17 RX ADMIN — HALOPERIDOL 1 MG: 1 TABLET ORAL at 05:35

## 2024-10-17 RX ADMIN — METHOCARBAMOL 500 MG: 500 TABLET ORAL at 08:46

## 2024-10-17 RX ADMIN — OXYCODONE HYDROCHLORIDE 10 MG: 10 TABLET ORAL at 08:47

## 2024-10-17 RX ADMIN — OXYCODONE HYDROCHLORIDE 5 MG: 5 TABLET ORAL at 05:39

## 2024-10-17 RX ADMIN — CLONAZEPAM 1 MG: 1 TABLET ORAL at 17:35

## 2024-10-17 RX ADMIN — LORATADINE 10 MG: 10 TABLET ORAL at 08:46

## 2024-10-17 RX ADMIN — NYSTATIN: 100000 CREAM TOPICAL at 17:36

## 2024-10-17 RX ADMIN — METHOCARBAMOL 500 MG: 500 TABLET ORAL at 17:35

## 2024-10-17 RX ADMIN — CARBAMIDE PEROXIDE 6.5% 5 DROP: 6.5 LIQUID AURICULAR (OTIC) at 08:50

## 2024-10-17 RX ADMIN — LORAZEPAM 1 MG: 2 INJECTION INTRAMUSCULAR; INTRAVENOUS at 09:54

## 2024-10-17 RX ADMIN — CARBAMIDE PEROXIDE 6.5% 5 DROP: 6.5 LIQUID AURICULAR (OTIC) at 17:36

## 2024-10-17 RX ADMIN — GABAPENTIN 250 MG: 250 SOLUTION ORAL at 21:04

## 2024-10-17 NOTE — PLAN OF CARE
Problem: PAIN - ADULT  Goal: Verbalizes/displays adequate comfort level or baseline comfort level  Description: Interventions:  - Encourage patient to monitor pain and request assistance  - Assess pain using appropriate pain scale  - Administer analgesics based on type and severity of pain and evaluate response  - Implement non-pharmacological measures as appropriate and evaluate response  - Consider cultural and social influences on pain and pain management  - Notify physician/advanced practitioner if interventions unsuccessful or patient reports new pain  Outcome: Progressing     Problem: INFECTION - ADULT  Goal: Absence or prevention of progression during hospitalization  Description: INTERVENTIONS:  - Assess and monitor for signs and symptoms of infection  - Monitor lab/diagnostic results  - Monitor all insertion sites, i.e. indwelling lines, tubes, and drains  - Monitor endotracheal if appropriate and nasal secretions for changes in amount and color  - [place] appropriate cooling/warming therapies per order  - Administer medications as ordered  - Instruct and encourage patient and family to use good hand hygiene technique  - Identify and instruct in appropriate isolation precautions for identified infection/condition  Outcome: Progressing  Goal: Absence of fever/infection during neutropenic period  Description: INTERVENTIONS:  - Monitor WBC    Outcome: Progressing     Problem: SAFETY ADULT  Goal: Patient will remain free of falls  Description: INTERVENTIONS:  - Educate patient/family on patient safety including physical limitations  - Instruct patient to call for assistance with activity   - Consult OT/PT to assist with strengthening/mobility   - Keep Call bell within reach  - Keep bed low and locked with side rails adjusted as appropriate  - Keep care items and personal belongings within reach  - Initiate and maintain comfort rounds  - Make Fall Risk Sign visible to staff  - Offer Toileting every 2 Hours,  in advance of need  - Initiate/Maintain alarm  - Obtain necessary fall risk management equipment:   - Apply yellow socks and bracelet for high fall risk patients  - Consider moving patient to room near nurses station  Outcome: Progressing  Goal: Maintain or return to baseline ADL function  Description: INTERVENTIONS:  -  Assess patient's ability to carry out ADLs; assess patient's baseline for ADL function and identify physical deficits which impact ability to perform ADLs (bathing, care of mouth/teeth, toileting, grooming, dressing, etc.)  - Assess/evaluate cause of self-care deficits   - Assess range of motion  - Assess patient's mobility; develop plan if impaired  - Assess patient's need for assistive devices and provide as appropriate  - Encourage maximum independence but intervene and supervise when necessary  - Involve family in performance of ADLs  - Assess for home care needs following discharge   - Consider OT consult to assist with ADL evaluation and planning for discharge  - Provide patient education as appropriate  Outcome: Progressing  Goal: Maintains/Returns to pre admission functional level  Description: INTERVENTIONS:  - Perform AM-PAC 6 Click Basic Mobility/ Daily Activity assessment daily.  - Set and communicate daily mobility goal to care team and patient/family/caregiver.   - Collaborate with rehabilitation services on mobility goals if consulted  - Reposition patient every 2 hours.  - Out of bed for toileting  - Record patient progress and toleration of activity level   Outcome: Progressing     Problem: Knowledge Deficit  Goal: Patient/family/caregiver demonstrates understanding of disease process, treatment plan, medications, and discharge instructions  Description: Complete learning assessment and assess knowledge base.  Interventions:  - Provide teaching at level of understanding  - Provide teaching via preferred learning methods  Outcome: Progressing

## 2024-10-17 NOTE — ASSESSMENT & PLAN NOTE
MRI in 7/2024   Infectious/inflammatory changes from the occiput to cervical spine as   discussed above. Likely improved fluid collection in the left posterior   paraspinal soft tissues at C1-2 level. Otherwise no significant change from   06/29/2023 including epidural soft tissue thickening and enhancement and fluid   collection along the right aspect of C1-C2 joint and prevertebral space.     Patient went to hospice and completed 3 months of doxycycline  Still complains of pain - palliative care following for assistance with pain management  Pt was discharged for [facility] hospice services due to suspected diversion of narcotics by her sister Jaycee.  Pt was sent to the ED at [facility] for an opinion regarding plan of care  Palliative care and hospice services are following  palliative care service following - changed methadone to fentanyl patch to assist with discharge planning to a facility  Pt reports adequate pain relief at this time  Seen by neuropsychology - pt does not have capacity  PT is not appropriate for PT/OT due to her osteomyelitis  Discharge planning to SNF for long term care, may need a guardian if there are no willing family members.

## 2024-10-17 NOTE — PROGRESS NOTES
Progress Note - Palliative Care   Name: Alyssa Madrigal 64 y.o. female I MRN: [id number]  Unit/Bed#: [facility] 815-01 I Date of Admission: 10/8/2024   Date of Service: 10/17/2024 I Hospital Day: 9     Assessment & Plan  Osteomyelitis (HCC)    COPD (chronic obstructive pulmonary disease) with chronic bronchitis (HCC)    Major depressive disorder, recurrent episode with anxious distress (HCC)  - Reduce bupropion today 300mg=>150mg for anxious and impulsive behaviors.  - add gabapentin for anxiety  IVDU (intravenous drug user)  - Pt will not be offered any controlled substance on discharge, unless she is discharged to a skilled environment.  Palliative care by specialist  Goals - maintain safe environment in hospital, DNR3, with proportional symptom management   - Pt deemed not competent today by NPsych professional.   - Incompetent persons cannot consent to hospice.   - Pt has no willing decision-makers   - Pt will likely need a guardian.  Post laminectomy syndrome  Increase fentanyl patch to 50mcg  - increase gabapentin to TID   Posttraumatic stress disorder  Likely to benefit from safe environs and multimodal psychotropic management    Tardive dyskinesia  Complicates ability to swallow pills; will trial liquids where possible  Controlled substance agreement broken    Fall      Interval history:       Patient very confused today.  Not oriented to place, time, situation.  Only consistently intelligible report is that she is having ongoing neck pain.     MEDICATIONS / ALLERGIES:     all current active meds have been reviewed and current meds:   Current Facility-Administered Medications:     acetaminophen (TYLENOL) oral suspension 320 mg, 4x Daily (with meals and at bedtime)    albuterol (PROVENTIL HFA,VENTOLIN HFA) inhaler 2 puff, Q4H PRN    buPROPion (WELLBUTRIN XL) 24 hr tablet 150 mg, Daily    carbamide peroxide (DEBROX) 6.5 % otic solution 5 drop, BID    clonazePAM (KlonoPIN) tablet 1 mg, TID    enoxaparin (LOVENOX)  "subcutaneous injection 40 mg, Daily    fentaNYL (DURAGESIC) 50 mcg/hr TD 72 hr patch 50 mcg, Q72H    gabapentin (NEURONTIN) oral solution 250 mg, BID    haloperidol (HALDOL) tablet 1 mg, BID PRN    HYDROmorphone (DILAUDID) injection 0.5 mg, Q3H PRN    loratadine (CLARITIN) tablet 10 mg, Daily    LORazepam (ATIVAN) injection 1 mg, Q6H PRN    LORazepam (ATIVAN) injection 2 mg, Q10 Min PRN    methocarbamol (ROBAXIN) tablet 500 mg, 4x Daily (with meals and at bedtime)    naloxone (NARCAN) intranasal 2 mg, Daily PRN    nystatin (MYCOSTATIN) cream, BID    ondansetron (ZOFRAN) injection 4 mg, Q6H PRN    oxyCODONE (ROXICODONE) immediate release tablet 10 mg, Q3H PRN    oxyCODONE (ROXICODONE) IR tablet 5 mg, Q3H PRN    polyethylene glycol (MIRALAX) packet 17 g, Daily PRN    QUEtiapine (SEROquel XR) 24 hr tablet 150 mg, HS    senna (SENOKOT) tablet 8.6 mg, HS    venlafaxine (EFFEXOR-XR) 24 hr capsule 225 mg, Daily    Allergies   Allergen Reactions    Other      PT \"There is some other antibiotic that I took,its a really long name. I dont remember the name\"     Pollen Extract Other (See Comments)     Sinus headache.    Sulfa Antibiotics      Unknown reaction      Cefazolin Hives and Rash       OBJECTIVE:    Physical Exam  Physical Exam  Vitals and nursing note reviewed.   Constitutional:       General: She is in acute distress.      Appearance: She is ill-appearing. She is not toxic-appearing or diaphoretic.   HENT:      Head: Atraumatic.      Right Ear: External ear normal.      Left Ear: External ear normal.      Nose: Nose normal.   Eyes:      General:         Right eye: No discharge.         Left eye: No discharge.   Cardiovascular:      Rate and Rhythm: Normal rate.   Pulmonary:      Effort: No respiratory distress.   Abdominal:      General: There is no distension.   Musculoskeletal:         General: Deformity (noted significant muscle wasting, most prominent in forearms) present. No swelling.   Skin:     General: Skin " "is warm and dry.      Coloration: Skin is not pale.   Neurological:      Mental Status: She is alert.      Comments: Patient alert, oriented to self only. Perseverative and confused.           Lab Results:           Invalid input(s): \"LABALBU\"    Imaging Studies: reviewed pertinent studies   EKG, Pathology, and Other Studies: reviewed pertinent studies    Counseling / Coordination of Care    Total floor / unit time spent today 30 minutes. Greater than 50% of total time was spent with the patient and / or family counseling and / or coordination of care. A description of the counseling / coordination of care: symptom assessment and management, medication review, medication adjustment, chart review, opioid titration, and coordination with primary, case mgmt                         "

## 2024-10-17 NOTE — PROGRESS NOTES
"Into do am meds; pt yelling/screaming/agitated; yelling \"I want to kill myself. I don't want to do this anymore. I want to commit myself a [phone] or [phone]\"; dr short aware  "

## 2024-10-17 NOTE — PROGRESS NOTES
Progress Note - Hospitalist   Name: Alyssa Madrigal 64 y.o. female I MRN: [id number]  Unit/Bed#: [facility] 815-01 I Date of Admission: 10/8/2024   Date of Service: 10/17/2024 I Hospital Day: 9     Assessment & Plan  Osteomyelitis (HCC)  MRI in 7/2024   Infectious/inflammatory changes from the occiput to cervical spine as   discussed above. Likely improved fluid collection in the left posterior   paraspinal soft tissues at C1-2 level. Otherwise no significant change from   06/29/2023 including epidural soft tissue thickening and enhancement and fluid   collection along the right aspect of C1-C2 joint and prevertebral space.     Patient went to hospice and completed 3 months of doxycycline  Still complains of pain - palliative care following for assistance with pain management  Pt was discharged for [facility] hospice services due to suspected diversion of narcotics by her sister Jaycee.  Pt was sent to the ED at [facility] for an opinion regarding plan of care  Palliative care and hospice services are following  palliative care service following - changed methadone to fentanyl patch to assist with discharge planning to a facility  Pt reports adequate pain relief at this time  Seen by neuropsychology - pt does not have capacity  PT is not appropriate for PT/OT due to her osteomyelitis  Discharge planning to SNF for long term care, may need a guardian if there are no willing family members.  Controlled substance agreement broken  Plan noted above  COPD (chronic obstructive pulmonary disease) with chronic bronchitis (HCC)  Continue albuterol  Major depressive disorder, recurrent episode with anxious distress (HCC)  Continue venlaflaxine  IVDU (intravenous drug user)  Noted in history  Fall  Had a fall out of bed last night on 10/11. CT scans and Xrs are without any new traumatic injury  Fall precautions  Continue analgesics  Post laminectomy syndrome    Posttraumatic stress disorder    Palliative care by specialist    Armando  "dyskinesia    .VTE Pharmacologic Prophylaxis:   Pharmacologic: Enoxaparin (Lovenox)  Mechanical VTE Prophylaxis in Place: No    Patient Centered Rounds: I have performed bedside rounds with nursing staff today.      Time Spent for Care: 1 hour.  More than 50% of total time spent on counseling and coordination of care as described above.    Current Length of Stay: 9 day(s)    Current Patient Status: Inpatient     Code Status: Level 3 - DNAR and DNI      Subjective:   nad    Objective:     Vitals:   Temp (24hrs), Av.9 °F (37.2 °C), Min:98.5 °F (36.9 °C), Max:99.5 °F (37.5 °C)    Temp:  [98.5 °F (36.9 °C)-99.5 °F (37.5 °C)] 99.5 °F (37.5 °C)  HR:  [] 115  Resp:  [16-17] 16  BP: ()/(58-89) 123/89  SpO2:  [93 %-98 %] 95 %  Body mass index is 20.17 kg/m².     Input and Output Summary (last 24 hours):       Intake/Output Summary (Last 24 hours) at 10/17/2024 0926  Last data filed at 10/17/2024 0256  Gross per 24 hour   Intake 478 ml   Output 575 ml   Net -97 ml       Physical Exam:     Physical Exam  Constitutional:       Appearance: Normal appearance.   HENT:      Head: Normocephalic and atraumatic.      Mouth/Throat:      Mouth: Mucous membranes are dry.   Cardiovascular:      Rate and Rhythm: Regular rhythm.   Pulmonary:      Effort: Pulmonary effort is normal.      Breath sounds: Normal breath sounds.   Abdominal:      General: Abdomen is flat.      Palpations: Abdomen is soft.   Neurological:      General: No focal deficit present.      Mental Status: She is alert and oriented to person, place, and time.   Psychiatric:         Mood and Affect: Mood normal.         Additional Data:     Labs:              Invalid input(s): \"LABALBU\"        * I Have Reviewed All Lab Data Listed Above.  * Additional Pertinent Lab Tests Reviewed: All Labs Within Last 24 Hours Reviewed      Recent Cultures (last 7 days):           Last 24 Hours Medication List:   Current Facility-Administered Medications   Medication Dose " Route Frequency Provider Last Rate    acetaminophen  320 mg Oral 4x Daily (with meals and at bedtime) Edenilson Joshi MD      albuterol  2 puff Inhalation Q4H PRN Rahul Christie MD      buPROPion  150 mg Oral Daily Myra George MD      carbamide peroxide  5 drop Left Ear BID Victorino Thorne MD      clonazePAM  1 mg Oral TID Edenilson Joshi MD      enoxaparin  40 mg Subcutaneous Daily Rahul Christie MD      fentaNYL  25 mcg Transdermal Q72H Nona Guajardo MD      gabapentin  250 mg Oral BID Myra George MD      haloperidol  1 mg Oral BID PRN Rahul Christie MD      HYDROmorphone  0.5 mg Intravenous Q3H PRN Edenilson Joshi MD      loratadine  10 mg Oral Daily Rahul Christie MD      LORazepam  1 mg Intravenous Q6H PRN Edenilson Joshi MD      LORazepam  2 mg Intravenous Q10 Min PRN Edenilson Joshi MD      methocarbamol  500 mg Oral 4x Daily (with meals and at bedtime) Edenilson Joshi MD      naloxone  2 mg Nasal Daily PRN Nona Guajardo MD      nystatin   Topical BID Victorino Thorne MD      ondansetron  4 mg Intravenous Q6H PRN Rahul Christie MD      oxyCODONE  10 mg Oral Q3H PRN Edenilson Joshi MD      oxyCODONE  5 mg Oral Q3H PRN Rahul Christie MD      polyethylene glycol  17 g Oral Daily PRN Hilda Dougherty DO      QUEtiapine  150 mg Oral HS Rahul Christie MD      senna  1 tablet Oral HS Hilda Dougherty DO      venlafaxine  225 mg Oral Daily Rahul Christie MD          Today, Patient Was Seen By: Estee Asencio DO    ** Please Note: Dictation voice to text software may have been used in the creation of this document. **

## 2024-10-17 NOTE — PLAN OF CARE
Problem: PAIN - ADULT  Goal: Verbalizes/displays adequate comfort level or baseline comfort level  Description: Interventions:  - Encourage patient to monitor pain and request assistance  - Assess pain using appropriate pain scale  - Administer analgesics based on type and severity of pain and evaluate response  - Implement non-pharmacological measures as appropriate and evaluate response  - Consider cultural and social influences on pain and pain management  - Notify physician/advanced practitioner if interventions unsuccessful or patient reports new pain  Outcome: Progressing     Problem: INFECTION - ADULT  Goal: Absence or prevention of progression during hospitalization  Description: INTERVENTIONS:  - Assess and monitor for signs and symptoms of infection  - Monitor lab/diagnostic results  - Monitor all insertion sites, i.e. indwelling lines, tubes, and drains  - Monitor endotracheal if appropriate and nasal secretions for changes in amount and color  - [place] appropriate cooling/warming therapies per order  - Administer medications as ordered  - Instruct and encourage patient and family to use good hand hygiene technique  - Identify and instruct in appropriate isolation precautions for identified infection/condition  Outcome: Progressing     Problem: SAFETY ADULT  Goal: Patient will remain free of falls  Description: INTERVENTIONS:  - Educate patient/family on patient safety including physical limitations  - Instruct patient to call for assistance with activity   - Consult OT/PT to assist with strengthening/mobility   - Keep Call bell within reach  - Keep bed low and locked with side rails adjusted as appropriate  - Keep care items and personal belongings within reach  - Initiate and maintain comfort rounds  - Make Fall Risk Sign visible to staff  Problem: DISCHARGE PLANNING  Goal: Discharge to home or other facility with appropriate resources  Description: INTERVENTIONS:  - Identify barriers to discharge  w/patient and caregiver  - Arrange for needed discharge resources and transportation as appropriate  - Identify discharge learning needs (meds, wound care, etc.)  - Arrange for interpretive services to assist at discharge as needed  - Refer to Case Management Department for coordinating discharge planning if the patient needs post-hospital services based on physician/advanced practitioner order or complex needs related to functional status, cognitive ability, or social support system  Outcome: Progressing     Problem: Knowledge Deficit  Goal: Patient/family/caregiver demonstrates understanding of disease process, treatment plan, medications, and discharge instructions  Description: Complete learning assessment and assess knowledge base.  Interventions:  - Provide teaching at level of understanding  - Provide teaching via preferred learning methods  Outcome: Progressing     Problem: Prexisting or High Potential for Compromised Skin Integrity  Goal: Skin integrity is maintained or improved  Description: INTERVENTIONS:  - Identify patients at risk for skin breakdown  - Assess and monitor skin integrity  - Assess and monitor nutrition and hydration status  - Monitor labs   - Assess for incontinence   - Turn and reposition patient  - Assist with mobility/ambulation  - Relieve pressure over bony prominences  - Avoid friction and shearing  - Provide appropriate hygiene as needed including keeping skin clean and dry  - Evaluate need for skin moisturizer/barrier cream  - Collaborate with interdisciplinary team   - Patient/family teaching  - Consider wound care consult   Outcome: Progressing     Problem: Nutrition/Hydration-ADULT  Goal: Nutrient/Hydration intake appropriate for improving, restoring or maintaining nutritional needs  Description: Monitor and assess patient's nutrition/hydration status for malnutrition. Collaborate with interdisciplinary team and initiate plan and interventions as ordered.  Monitor patient's  weight and dietary intake as ordered or per policy. Utilize nutrition screening tool and intervene as necessary. Determine patient's food preferences and provide high-protein, high-caloric foods as appropriate.     INTERVENTIONS:  - Monitor oral intake, urinary output, labs, and treatment plans  - Assess nutrition and hydration status and recommend course of action  - Evaluate amount of meals eaten  - Assist patient with eating if necessary   - Allow adequate time for meals  - Recommend/ encourage appropriate diets, oral nutritional supplements, and vitamin/mineral supplements  - Order, calculate, and assess calorie counts as needed  - Recommend, monitor, and adjust tube feedings and TPN/PPN based on assessed needs  - Assess need for intravenous fluids  - Provide specific nutrition/hydration education as appropriate  - Include patient/family/caregiver in decisions related to nutrition  Outcome: Progressing     Problem: SAFETY,RESTRAINT: NV/NON-SELF DESTRUCTIVE BEHAVIOR  Goal: Remains free of harm/injury (restraint for non violent/non self-detsructive behavior)  Description: INTERVENTIONS:  - Instruct patient/family regarding restraint use   - Assess and monitor physiologic and psychological status   - Provide interventions and comfort measures to meet assessed patient needs   - Identify and implement measures to help patient regain control  - Assess readiness for release of restraint   Outcome: Progressing     Problem: SAFETY,RESTRAINT: NV/NON-SELF DESTRUCTIVE BEHAVIOR  Goal: Returns to optimal restraint-free functioning  Description: INTERVENTIONS:  - Assess the patient's behavior and symptoms that indicate continued need for restraint  - Identify and implement measures to help patient regain control  - Assess readiness for release of restraint   Outcome: Progressing     Problem: GENITOURINARY - ADULT  Goal: Urinary catheter remains patent  Description: INTERVENTIONS:  - Assess patency of urinary catheter  - If  patient has a chronic bhatti, consider changing catheter if non-functioning  - Follow guidelines for intermittent irrigation of non-functioning urinary catheter  Outcome: Progressing     Problem: METABOLIC, FLUID AND ELECTROLYTES - ADULT  Goal: Electrolytes maintained within normal limits  Description: INTERVENTIONS:  - Monitor labs and assess patient for signs and symptoms of electrolyte imbalances  - Administer electrolyte replacement as ordered  - Monitor response to electrolyte replacements, including repeat lab results as appropriate  - Instruct patient on fluid and nutrition as appropriate  Outcome: Progressing     Problem: METABOLIC, FLUID AND ELECTROLYTES - ADULT  Goal: Fluid balance maintained  Description: INTERVENTIONS:  - Monitor labs   - Monitor I/O and WT  - Instruct patient on fluid and nutrition as appropriate  - Assess for signs & symptoms of volume excess or deficit  Outcome: Progressing     Problem: HEMATOLOGIC - ADULT  Goal: Maintains hematologic stability  Description: INTERVENTIONS  - Assess for signs and symptoms of bleeding or hemorrhage  - Monitor labs  - Administer supportive blood products/factors as ordered and appropriate  Outcome: Progressing     Problem: MUSCULOSKELETAL - ADULT  Goal: Maintain or return mobility to safest level of function  Description: INTERVENTIONS:  - Assess patient's ability to carry out ADLs; assess patient's baseline for ADL function and identify physical deficits which impact ability to perform ADLs (bathing, care of mouth/teeth, toileting, grooming, dressing, etc.)  - Assess/evaluate cause of self-care deficits   - Assess range of motion  - Assess patient's mobility  - Assess patient's need for assistive devices and provide as appropriate  - Encourage maximum independence but intervene and supervise when necessary  - Involve family in performance of ADLs  - Assess for home care needs following discharge   - Consider OT consult to assist with ADL evaluation and  planning for discharge  - Provide patient education as appropriate  Outcome: Progressing     Problem: MUSCULOSKELETAL - ADULT  Goal: Maintain proper alignment of affected body part  Description: INTERVENTIONS:  - Support, maintain and protect limb and body alignment  - Provide patient/ family with appropriate education  Outcome: Progressing     - Apply yellow socks and bracelet for high fall risk patients  - Consider moving patient to room near nurses station  Outcome: Progressing

## 2024-10-18 PROCEDURE — 99233 SBSQ HOSP IP/OBS HIGH 50: CPT | Performed by: INTERNAL MEDICINE

## 2024-10-18 PROCEDURE — 99232 SBSQ HOSP IP/OBS MODERATE 35: CPT | Performed by: INTERNAL MEDICINE

## 2024-10-18 RX ADMIN — CARBAMIDE PEROXIDE 6.5% 5 DROP: 6.5 LIQUID AURICULAR (OTIC) at 08:45

## 2024-10-18 RX ADMIN — ACETAMINOPHEN 320 MG: 650 SUSPENSION ORAL at 08:51

## 2024-10-18 RX ADMIN — CLONAZEPAM 1 MG: 1 TABLET ORAL at 21:31

## 2024-10-18 RX ADMIN — ACETAMINOPHEN 320 MG: 650 SUSPENSION ORAL at 16:24

## 2024-10-18 RX ADMIN — NYSTATIN: 100000 CREAM TOPICAL at 18:39

## 2024-10-18 RX ADMIN — METHOCARBAMOL 500 MG: 500 TABLET ORAL at 21:31

## 2024-10-18 RX ADMIN — ENOXAPARIN SODIUM 40 MG: 40 INJECTION SUBCUTANEOUS at 08:44

## 2024-10-18 RX ADMIN — OXYCODONE HYDROCHLORIDE 10 MG: 10 TABLET ORAL at 09:52

## 2024-10-18 RX ADMIN — ACETAMINOPHEN 320 MG: 650 SUSPENSION ORAL at 21:31

## 2024-10-18 RX ADMIN — METHOCARBAMOL 500 MG: 500 TABLET ORAL at 11:55

## 2024-10-18 RX ADMIN — LORATADINE 10 MG: 10 TABLET ORAL at 08:45

## 2024-10-18 RX ADMIN — BUPROPION HYDROCHLORIDE 150 MG: 150 TABLET, EXTENDED RELEASE ORAL at 08:45

## 2024-10-18 RX ADMIN — METHOCARBAMOL 500 MG: 500 TABLET ORAL at 08:51

## 2024-10-18 RX ADMIN — HYDROMORPHONE HYDROCHLORIDE 0.5 MG: 1 INJECTION, SOLUTION INTRAMUSCULAR; INTRAVENOUS; SUBCUTANEOUS at 10:51

## 2024-10-18 RX ADMIN — ACETAMINOPHEN 320 MG: 650 SUSPENSION ORAL at 11:55

## 2024-10-18 RX ADMIN — GABAPENTIN 250 MG: 250 SOLUTION ORAL at 21:31

## 2024-10-18 RX ADMIN — GABAPENTIN 250 MG: 250 SOLUTION ORAL at 08:52

## 2024-10-18 RX ADMIN — QUETIAPINE 150 MG: 150 TABLET, FILM COATED, EXTENDED RELEASE ORAL at 21:31

## 2024-10-18 RX ADMIN — VENLAFAXINE HYDROCHLORIDE 225 MG: 150 CAPSULE, EXTENDED RELEASE ORAL at 08:45

## 2024-10-18 RX ADMIN — METHOCARBAMOL 500 MG: 500 TABLET ORAL at 16:24

## 2024-10-18 RX ADMIN — NYSTATIN: 100000 CREAM TOPICAL at 08:46

## 2024-10-18 RX ADMIN — OXYCODONE HYDROCHLORIDE 10 MG: 10 TABLET ORAL at 13:19

## 2024-10-18 RX ADMIN — SENNOSIDES 8.6 MG: 8.6 TABLET, FILM COATED ORAL at 21:31

## 2024-10-18 RX ADMIN — OXYCODONE HYDROCHLORIDE 10 MG: 10 TABLET ORAL at 18:41

## 2024-10-18 RX ADMIN — GABAPENTIN 250 MG: 250 SOLUTION ORAL at 16:24

## 2024-10-18 RX ADMIN — OXYCODONE HYDROCHLORIDE 10 MG: 10 TABLET ORAL at 22:32

## 2024-10-18 RX ADMIN — CLONAZEPAM 1 MG: 1 TABLET ORAL at 16:24

## 2024-10-18 RX ADMIN — CLONAZEPAM 1 MG: 1 TABLET ORAL at 08:45

## 2024-10-18 NOTE — CASE MANAGEMENT
Case Management Progress Note    Patient name Alyssa Madrigal  Location [facility]-01 MRN [id number]  : 1960 Date 10/18/2024       LOS (days): 10  Geometric Mean LOS (GMLOS) (days): 4.2  Days to GMLOS:-5.3        PROGRESS NOTE:     emailed completed guardianship form to hospital  Jesus Omer.

## 2024-10-18 NOTE — ASSESSMENT & PLAN NOTE
MRI in 7/2024   Infectious/inflammatory changes from the occiput to cervical spine as   discussed above. Likely improved fluid collection in the left posterior   paraspinal soft tissues at C1-2 level. Otherwise no significant change from   06/29/2023 including epidural soft tissue thickening and enhancement and fluid   collection along the right aspect of C1-C2 joint and prevertebral space.     Patient went to hospice and completed 3 months of doxycycline  Still complains of pain - palliative care following for assistance with pain management  Pt was discharged for [facility] hospice services due to suspected diversion of narcotics by her sister Jaycee.  Pt was sent to the ED at [facility] for an opinion regarding plan of care  Palliative care and hospice services are following  palliative care service following - changed methadone to fentanyl patch to assist with discharge planning to a facility  Pt reports adequate pain relief at this time  Seen by neuropsychology - pt does not have capacity  PT is not appropriate for PT/OT due to her osteomyelitis  Discharge planning to SNF for long term care, may need a guardian if there are no willing family members.

## 2024-10-18 NOTE — PROGRESS NOTES
Progress Note - Hospitalist   Name: Alyssa Madrigal 64 y.o. female I MRN: [id number]  Unit/Bed#: [facility] 815-01 I Date of Admission: 10/8/2024   Date of Service: 10/18/2024 I Hospital Day: 10     Assessment & Plan  Osteomyelitis (HCC)  MRI in 7/2024   Infectious/inflammatory changes from the occiput to cervical spine as   discussed above. Likely improved fluid collection in the left posterior   paraspinal soft tissues at C1-2 level. Otherwise no significant change from   06/29/2023 including epidural soft tissue thickening and enhancement and fluid   collection along the right aspect of C1-C2 joint and prevertebral space.     Patient went to hospice and completed 3 months of doxycycline  Still complains of pain - palliative care following for assistance with pain management  Pt was discharged for [facility] hospice services due to suspected diversion of narcotics by her sister Jaycee.  Pt was sent to the ED at [facility] for an opinion regarding plan of care  Palliative care and hospice services are following  palliative care service following - changed methadone to fentanyl patch to assist with discharge planning to a facility  Pt reports adequate pain relief at this time  Seen by neuropsychology - pt does not have capacity  PT is not appropriate for PT/OT due to her osteomyelitis  Discharge planning to SNF for long term care, may need a guardian if there are no willing family members.  Controlled substance agreement broken  Plan noted above  COPD (chronic obstructive pulmonary disease) with chronic bronchitis (HCC)  Continue albuterol  Major depressive disorder, recurrent episode with anxious distress (HCC)  Continue venlaflaxine  IVDU (intravenous drug user)  Noted in history  Fall  Had a fall out of bed last night on 10/11. CT scans and Xrs are without any new traumatic injury  Fall precautions  Continue analgesics  Post laminectomy syndrome    Posttraumatic stress disorder    Palliative care by specialist    Armando  "dyskinesia    VTE Pharmacologic Prophylaxis:   Pharmacologic: Enoxaparin (Lovenox)  Mechanical VTE Prophylaxis in Place: No    Patient Centered Rounds: I have performed bedside rounds with nursing staff today.      Time Spent for Care: 1 hour.  More than 50% of total time spent on counseling and coordination of care as described above.    Current Length of Stay: 10 day(s)    Current Patient Status: Inpatient     Code Status: Level 3 - DNAR and DNI      Subjective:   nad    Objective:     Vitals:   Temp (24hrs), Av.6 °F (37 °C), Min:97.6 °F (36.4 °C), Max:100 °F (37.8 °C)    Temp:  [97.6 °F (36.4 °C)-100 °F (37.8 °C)] 97.6 °F (36.4 °C)  HR:  [] 83  Resp:  [15-20] 15  BP: ()/(66-91) 94/66  SpO2:  [94 %-96 %] 95 %  Body mass index is 20.06 kg/m².     Input and Output Summary (last 24 hours):       Intake/Output Summary (Last 24 hours) at 10/18/2024 0842  Last data filed at 10/18/2024 0451  Gross per 24 hour   Intake 240 ml   Output 1050 ml   Net -810 ml       Physical Exam:     Physical Exam  HENT:      Head: Normocephalic and atraumatic.   Cardiovascular:      Rate and Rhythm: Normal rate and regular rhythm.   Skin:     General: Skin is warm.         Additional Data:     Labs:              Invalid input(s): \"LABALBU\"        Recent Cultures (last 7 days):           Last 24 Hours Medication List:   Current Facility-Administered Medications   Medication Dose Route Frequency Provider Last Rate    acetaminophen  320 mg Oral 4x Daily (with meals and at bedtime) Edenilson Joshi MD      albuterol  2 puff Inhalation Q4H PRN Rahul Christie MD      buPROPion  150 mg Oral Daily Myra George MD      carbamide peroxide  5 drop Left Ear BID Victorino Thorne MD      clonazePAM  1 mg Oral TID Edenilson Joshi MD      enoxaparin  40 mg Subcutaneous Daily Rahul Christie MD      fentaNYL  50 mcg Transdermal Q72H Aftab Ramos MD      gabapentin  250 mg Oral TID Aftab Ramos MD      " haloperidol  1 mg Oral BID PRN Rahul Christie MD      HYDROmorphone  0.5 mg Intravenous Q3H PRN Edenilson Joshi MD      loratadine  10 mg Oral Daily Rahul Christie MD      LORazepam  1 mg Intravenous Q6H PRN Edenilson Joshi MD      LORazepam  2 mg Intravenous Q10 Min PRN Edenilson Joshi MD      methocarbamol  500 mg Oral 4x Daily (with meals and at bedtime) Edenilson Joshi MD      naloxone  2 mg Nasal Daily PRN Nona Guajardo MD      nystatin   Topical BID Victorino Thorne MD      ondansetron  4 mg Intravenous Q6H PRN Rahul Christie MD      oxyCODONE  10 mg Oral Q3H PRN Edenilson Joshi MD      oxyCODONE  5 mg Oral Q3H PRN Rahul Christie MD      polyethylene glycol  17 g Oral Daily PRN Hilda Dougherty DO      QUEtiapine  150 mg Oral HS Rahul Christie MD      senna  1 tablet Oral HS Hilda Dougherty DO      venlafaxine  225 mg Oral Daily Rahul Christie MD          Today, Patient Was Seen By: Estee Asencio DO    ** Please Note: Dictation voice to text software may have been used in the creation of this document. **

## 2024-10-18 NOTE — RESTORATIVE TECHNICIAN NOTE
Restorative Technician Note      Patient Name: Alyssa Madrigal     Restorative Tech Visit Date: 10/18/24  Note Type: Mobility  Patient Position Upon Consult: Supine  Activity Performed: Range of motion; Repositioned; Other (Comment) (ADLs; supine therex; chair position; posey donned)  Education Provided: Yes  Patient Position at End of Consult: Supine; All needs within reach; Bed/Chair alarm activated    Lindsay Maddox  DPT, Restorative Technician

## 2024-10-18 NOTE — PROGRESS NOTES
Pastoral Care Progress Note             10/18/24 1200   Clinical Encounter Type   Visited With Patient   Routine Visit Follow-up      met with pt for follow-up visit and found the pt eating and not wanting a visit at that time.  will remain available.

## 2024-10-18 NOTE — PROGRESS NOTES
Progress Note - Palliative Care   Name: Alyssa Madrigal 64 y.o. female I MRN: [id number]  Unit/Bed#: [facility] 815-01 I Date of Admission: 10/8/2024   Date of Service: 10/18/2024 I Hospital Day: 10     Assessment & Plan  Osteomyelitis (HCC)    COPD (chronic obstructive pulmonary disease) with chronic bronchitis (HCC)    Major depressive disorder, recurrent episode with anxious distress (HCC)  - Reduce bupropion today 300mg=>150mg for anxious and impulsive behaviors.  - add gabapentin for anxiety  IVDU (intravenous drug user)  - Pt will not be offered any controlled substance on discharge, unless she is discharged to a skilled environment.  Palliative care by specialist  Goals - maintain safe environment in hospital, DNR3, with proportional symptom management   - Pt deemed not competent today by NPsych professional.   - Incompetent persons cannot consent to hospice.   - Pt has no willing decision-makers   - Pt will likely need a guardian.  Post laminectomy syndrome  -fentanyl patch to 50mcg  - increase gabapentin to TID   Posttraumatic stress disorder  Likely to benefit from safe environs and multimodal psychotropic management    Tardive dyskinesia  Complicates ability to swallow pills; will trial liquids where possible  Controlled substance agreement broken    Fall      Interval history:       Patient still quite confused, but pain is notably better.  She is able to report on this to an extent and is showing no non-verbal pain symptoms unlike our encounter yesterday.     MEDICATIONS / ALLERGIES:     all current active meds have been reviewed and current meds:   Current Facility-Administered Medications:     acetaminophen (TYLENOL) oral suspension 320 mg, 4x Daily (with meals and at bedtime)    albuterol (PROVENTIL HFA,VENTOLIN HFA) inhaler 2 puff, Q4H PRN    buPROPion (WELLBUTRIN XL) 24 hr tablet 150 mg, Daily    carbamide peroxide (DEBROX) 6.5 % otic solution 5 drop, BID    clonazePAM (KlonoPIN) tablet 1 mg, TID     "enoxaparin (LOVENOX) subcutaneous injection 40 mg, Daily    fentaNYL (DURAGESIC) 50 mcg/hr TD 72 hr patch 50 mcg, Q72H    gabapentin (NEURONTIN) oral solution 250 mg, TID    haloperidol (HALDOL) tablet 1 mg, BID PRN    HYDROmorphone (DILAUDID) injection 0.5 mg, Q3H PRN    loratadine (CLARITIN) tablet 10 mg, Daily    LORazepam (ATIVAN) injection 1 mg, Q6H PRN    LORazepam (ATIVAN) injection 2 mg, Q10 Min PRN    methocarbamol (ROBAXIN) tablet 500 mg, 4x Daily (with meals and at bedtime)    naloxone (NARCAN) intranasal 2 mg, Daily PRN    nystatin (MYCOSTATIN) cream, BID    ondansetron (ZOFRAN) injection 4 mg, Q6H PRN    oxyCODONE (ROXICODONE) immediate release tablet 10 mg, Q3H PRN    oxyCODONE (ROXICODONE) IR tablet 5 mg, Q3H PRN    polyethylene glycol (MIRALAX) packet 17 g, Daily PRN    QUEtiapine (SEROquel XR) 24 hr tablet 150 mg, HS    senna (SENOKOT) tablet 8.6 mg, HS    venlafaxine (EFFEXOR-XR) 24 hr capsule 225 mg, Daily    Allergies   Allergen Reactions    Other      PT \"There is some other antibiotic that I took,its a really long name. I dont remember the name\"     Pollen Extract Other (See Comments)     Sinus headache.    Sulfa Antibiotics      Unknown reaction      Cefazolin Hives and Rash       OBJECTIVE:    Physical Exam  Physical Exam  Vitals and nursing note reviewed.   Constitutional:       General: She is not in acute distress.     Appearance: She is ill-appearing. She is not toxic-appearing or diaphoretic.   HENT:      Head: Normocephalic and atraumatic.      Right Ear: External ear normal.      Left Ear: External ear normal.      Nose: Nose normal.      Mouth/Throat:      Mouth: Mucous membranes are moist.   Eyes:      General:         Right eye: No discharge.         Left eye: No discharge.   Cardiovascular:      Rate and Rhythm: Normal rate.   Pulmonary:      Effort: No respiratory distress.   Abdominal:      General: There is no distension.   Musculoskeletal:         General: Deformity " "(contractures and muscle wasting) present. No swelling.   Skin:     General: Skin is warm and dry.      Coloration: Skin is not jaundiced.   Neurological:      Mental Status: She is alert.      Comments: Oriented to self only, tangential and nonsensical at times   Psychiatric:      Comments: Pleasant but quickly escalates         Lab Results:           Invalid input(s): \"LABALBU\"    Imaging Studies: reviewed pertinent studies   EKG, Pathology, and Other Studies: reviewed pertinent studies    Counseling / Coordination of Care    Total floor / unit time spent today 25 minutes. Greater than 50% of total time was spent with the patient and / or family counseling and / or coordination of care. A description of the counseling / coordination of care: symptom assessment and management, medication review, psychosocial support, chart review, imaging review, supportive listening, and anticipatory guidance                           "

## 2024-10-19 LAB — GLUCOSE SERPL-MCNC: 136 MG/DL (ref 65–140)

## 2024-10-19 PROCEDURE — 99233 SBSQ HOSP IP/OBS HIGH 50: CPT | Performed by: INTERNAL MEDICINE

## 2024-10-19 PROCEDURE — 82948 REAGENT STRIP/BLOOD GLUCOSE: CPT

## 2024-10-19 RX ADMIN — HYDROMORPHONE HYDROCHLORIDE 0.5 MG: 1 INJECTION, SOLUTION INTRAMUSCULAR; INTRAVENOUS; SUBCUTANEOUS at 15:10

## 2024-10-19 RX ADMIN — BUPROPION HYDROCHLORIDE 150 MG: 150 TABLET, EXTENDED RELEASE ORAL at 08:35

## 2024-10-19 RX ADMIN — HYDROMORPHONE HYDROCHLORIDE 0.5 MG: 1 INJECTION, SOLUTION INTRAMUSCULAR; INTRAVENOUS; SUBCUTANEOUS at 23:43

## 2024-10-19 RX ADMIN — ACETAMINOPHEN 320 MG: 650 SUSPENSION ORAL at 08:35

## 2024-10-19 RX ADMIN — HYDROMORPHONE HYDROCHLORIDE 0.5 MG: 1 INJECTION, SOLUTION INTRAMUSCULAR; INTRAVENOUS; SUBCUTANEOUS at 20:04

## 2024-10-19 RX ADMIN — ACETAMINOPHEN 320 MG: 650 SUSPENSION ORAL at 17:53

## 2024-10-19 RX ADMIN — LORAZEPAM 1 MG: 2 INJECTION INTRAMUSCULAR; INTRAVENOUS at 02:16

## 2024-10-19 RX ADMIN — CLONAZEPAM 1 MG: 1 TABLET ORAL at 15:10

## 2024-10-19 RX ADMIN — OXYCODONE HYDROCHLORIDE 10 MG: 10 TABLET ORAL at 11:33

## 2024-10-19 RX ADMIN — METHOCARBAMOL 500 MG: 500 TABLET ORAL at 21:09

## 2024-10-19 RX ADMIN — ACETAMINOPHEN 320 MG: 650 SUSPENSION ORAL at 21:09

## 2024-10-19 RX ADMIN — QUETIAPINE 150 MG: 150 TABLET, FILM COATED, EXTENDED RELEASE ORAL at 21:09

## 2024-10-19 RX ADMIN — VENLAFAXINE HYDROCHLORIDE 225 MG: 150 CAPSULE, EXTENDED RELEASE ORAL at 08:35

## 2024-10-19 RX ADMIN — SENNOSIDES 8.6 MG: 8.6 TABLET, FILM COATED ORAL at 21:09

## 2024-10-19 RX ADMIN — LORATADINE 10 MG: 10 TABLET ORAL at 08:35

## 2024-10-19 RX ADMIN — OXYCODONE HYDROCHLORIDE 10 MG: 10 TABLET ORAL at 14:32

## 2024-10-19 RX ADMIN — GABAPENTIN 250 MG: 250 SOLUTION ORAL at 15:10

## 2024-10-19 RX ADMIN — LORAZEPAM 1 MG: 2 INJECTION INTRAMUSCULAR; INTRAVENOUS at 12:05

## 2024-10-19 RX ADMIN — OXYCODONE HYDROCHLORIDE 10 MG: 10 TABLET ORAL at 22:52

## 2024-10-19 RX ADMIN — ENOXAPARIN SODIUM 40 MG: 40 INJECTION SUBCUTANEOUS at 08:34

## 2024-10-19 RX ADMIN — NYSTATIN: 100000 CREAM TOPICAL at 17:53

## 2024-10-19 RX ADMIN — ACETAMINOPHEN 320 MG: 650 SUSPENSION ORAL at 11:33

## 2024-10-19 RX ADMIN — CLONAZEPAM 1 MG: 1 TABLET ORAL at 08:35

## 2024-10-19 RX ADMIN — NYSTATIN: 100000 CREAM TOPICAL at 08:35

## 2024-10-19 RX ADMIN — OXYCODONE HYDROCHLORIDE 10 MG: 10 TABLET ORAL at 18:42

## 2024-10-19 RX ADMIN — HYDROMORPHONE HYDROCHLORIDE 0.5 MG: 1 INJECTION, SOLUTION INTRAMUSCULAR; INTRAVENOUS; SUBCUTANEOUS at 12:05

## 2024-10-19 RX ADMIN — METHOCARBAMOL 500 MG: 500 TABLET ORAL at 11:32

## 2024-10-19 RX ADMIN — LORAZEPAM 1 MG: 2 INJECTION INTRAMUSCULAR; INTRAVENOUS at 18:42

## 2024-10-19 RX ADMIN — METHOCARBAMOL 500 MG: 500 TABLET ORAL at 17:53

## 2024-10-19 RX ADMIN — HYDROMORPHONE HYDROCHLORIDE 0.5 MG: 1 INJECTION, SOLUTION INTRAMUSCULAR; INTRAVENOUS; SUBCUTANEOUS at 01:13

## 2024-10-19 RX ADMIN — GABAPENTIN 250 MG: 250 SOLUTION ORAL at 08:39

## 2024-10-19 RX ADMIN — GABAPENTIN 250 MG: 250 SOLUTION ORAL at 21:09

## 2024-10-19 RX ADMIN — METHOCARBAMOL 500 MG: 500 TABLET ORAL at 08:35

## 2024-10-19 RX ADMIN — CLONAZEPAM 1 MG: 1 TABLET ORAL at 21:09

## 2024-10-19 NOTE — PLAN OF CARE
Problem: PAIN - ADULT  Goal: Verbalizes/displays adequate comfort level or baseline comfort level  Description: Interventions:  - Encourage patient to monitor pain and request assistance  - Assess pain using appropriate pain scale  - Administer analgesics based on type and severity of pain and evaluate response  - Implement non-pharmacological measures as appropriate and evaluate response  - Consider cultural and social influences on pain and pain management  - Notify physician/advanced practitioner if interventions unsuccessful or patient reports new pain  Outcome: Progressing     Problem: INFECTION - ADULT  Goal: Absence or prevention of progression during hospitalization  Description: INTERVENTIONS:  - Assess and monitor for signs and symptoms of infection  - Monitor lab/diagnostic results  - Monitor all insertion sites, i.e. indwelling lines, tubes, and drains  - Monitor endotracheal if appropriate and nasal secretions for changes in amount and color  - [place] appropriate cooling/warming therapies per order  - Administer medications as ordered  - Instruct and encourage patient and family to use good hand hygiene technique  - Identify and instruct in appropriate isolation precautions for identified infection/condition  Outcome: Progressing

## 2024-10-19 NOTE — ASSESSMENT & PLAN NOTE
MRI in 7/2024   Infectious/inflammatory changes from the occiput to cervical spine as   discussed above. Likely improved fluid collection in the left posterior   paraspinal soft tissues at C1-2 level. Otherwise no significant change from   06/29/2023 including epidural soft tissue thickening and enhancement and fluid   collection along the right aspect of C1-C2 joint and prevertebral space.     Patient went to hospice and completed 3 months of doxycycline  Still complains of pain - palliative care following for assistance with pain management  Pt was discharged for [facility] hospice services due to suspected diversion of narcotics by her sister Jaycee.  Pt was sent to the ED at [facility] for an opinion regarding plan of care  Palliative care and hospice services are following  palliative care service following - changed methadone to fentanyl patch to assist with discharge planning to a facility  Pt reports adequate pain relief at this time  Seen by neuropsychology - pt does not have capacity  PT is not appropriate for PT/OT due to her osteomyelitis  Discharge planning to SNF for long term care, may need a guardian if there are no willing family members.

## 2024-10-19 NOTE — PROGRESS NOTES
Progress Note - Hospitalist   Name: Alyssa Madrigal 64 y.o. female I MRN: [id number]  Unit/Bed#: [facility] 815-01 I Date of Admission: 10/8/2024   Date of Service: 10/19/2024 I Hospital Day: 11     Assessment & Plan  Osteomyelitis (HCC)  MRI in 7/2024   Infectious/inflammatory changes from the occiput to cervical spine as   discussed above. Likely improved fluid collection in the left posterior   paraspinal soft tissues at C1-2 level. Otherwise no significant change from   06/29/2023 including epidural soft tissue thickening and enhancement and fluid   collection along the right aspect of C1-C2 joint and prevertebral space.     Patient went to hospice and completed 3 months of doxycycline  Still complains of pain - palliative care following for assistance with pain management  Pt was discharged for [facility] hospice services due to suspected diversion of narcotics by her sister Jaycee.  Pt was sent to the ED at [facility] for an opinion regarding plan of care  Palliative care and hospice services are following  palliative care service following - changed methadone to fentanyl patch to assist with discharge planning to a facility  Pt reports adequate pain relief at this time  Seen by neuropsychology - pt does not have capacity  PT is not appropriate for PT/OT due to her osteomyelitis  Discharge planning to SNF for long term care, may need a guardian if there are no willing family members.  Controlled substance agreement broken  Plan noted above  COPD (chronic obstructive pulmonary disease) with chronic bronchitis (HCC)  Continue albuterol  Major depressive disorder, recurrent episode with anxious distress (HCC)  Continue venlaflaxine  IVDU (intravenous drug user)  Noted in history  Fall  Had a fall out of bed last night on 10/11. CT scans and Xrs are without any new traumatic injury  Fall precautions  Continue analgesics  Post laminectomy syndrome    Posttraumatic stress disorder    Palliative care by specialist    Armando  "dyskinesia    VTE Pharmacologic Prophylaxis:   Pharmacologic: Enoxaparin (Lovenox)  Mechanical VTE Prophylaxis in Place: No        Time Spent for Care: 1 hour.  More than 50% of total time spent on counseling and coordination of care as described above.    Current Length of Stay: 11 day(s)    Current Patient Status: Inpatient       Code Status: Level 3 - DNAR and DNI      Subjective:   nad    Objective:     Vitals:   Temp (24hrs), Av.6 °F (37 °C), Min:97.3 °F (36.3 °C), Max:99.5 °F (37.5 °C)    Temp:  [97.3 °F (36.3 °C)-99.5 °F (37.5 °C)] 97.3 °F (36.3 °C)  HR:  [94-98] 98  Resp:  [16-20] 16  BP: (117-120)/(83) 120/83  SpO2:  [95 %-97 %] 97 %  Body mass index is 20.06 kg/m².     Input and Output Summary (last 24 hours):       Intake/Output Summary (Last 24 hours) at 10/19/2024 1003  Last data filed at 10/19/2024 0219  Gross per 24 hour   Intake 180 ml   Output 675 ml   Net -495 ml       Physical Exam:     Physical Exam  HENT:      Head: Normocephalic and atraumatic.   Cardiovascular:      Rate and Rhythm: Normal rate and regular rhythm.   Pulmonary:      Effort: Pulmonary effort is normal.      Breath sounds: Normal breath sounds.   Abdominal:      General: Abdomen is flat.      Palpations: Abdomen is soft.   Neurological:      General: No focal deficit present.      Mental Status: She is alert and oriented to person, place, and time.   Psychiatric:         Mood and Affect: Mood normal.         Additional Data:     Labs:              Invalid input(s): \"LABALBU\"          Recent Cultures (last 7 days):           Last 24 Hours Medication List:   Current Facility-Administered Medications   Medication Dose Route Frequency Provider Last Rate    acetaminophen  320 mg Oral 4x Daily (with meals and at bedtime) Edenilson Joshi MD      albuterol  2 puff Inhalation Q4H PRN Rahul Christie MD      buPROPion  150 mg Oral Daily Myra George MD      clonazePAM  1 mg Oral TID Edenilson Joshi MD      enoxaparin  " 40 mg Subcutaneous Daily Rahul Christie MD      fentaNYL  50 mcg Transdermal Q72H Aftab Ramos MD      gabapentin  250 mg Oral TID Aftab Ramos MD      haloperidol  1 mg Oral BID PRN Rahul Christie MD      HYDROmorphone  0.5 mg Intravenous Q3H PRN Edenilson Joshi MD      loratadine  10 mg Oral Daily Rahul Christie MD      LORazepam  1 mg Intravenous Q6H PRN Edenilson Joshi MD      LORazepam  2 mg Intravenous Q10 Min PRN Edenilson Joshi MD      methocarbamol  500 mg Oral 4x Daily (with meals and at bedtime) Edenilson Joshi MD      naloxone  2 mg Nasal Daily PRN Nona Guajardo MD      nystatin   Topical BID Victorino Thorne MD      ondansetron  4 mg Intravenous Q6H PRN Rahul Christie MD      oxyCODONE  10 mg Oral Q3H PRN Edenilson Joshi MD      oxyCODONE  5 mg Oral Q3H PRN Rahul Christie MD      polyethylene glycol  17 g Oral Daily PRN Hilda Dougherty DO      QUEtiapine  150 mg Oral HS Rahul Christie MD      senna  1 tablet Oral HS Hilda Dougherty DO      venlafaxine  225 mg Oral Daily Rahul Christie MD          Today, Patient Was Seen By: Estee Asencio DO    ** Please Note: Dictation voice to text software may have been used in the creation of this document. **

## 2024-10-20 LAB
ANION GAP SERPL CALCULATED.3IONS-SCNC: 8 MMOL/L (ref 4–13)
BASOPHILS # BLD AUTO: 0.06 THOUSANDS/ΜL (ref 0–0.1)
BASOPHILS NFR BLD AUTO: 1 % (ref 0–1)
BUN SERPL-MCNC: 13 MG/DL (ref 5–25)
CALCIUM SERPL-MCNC: 9.1 MG/DL (ref 8.4–10.2)
CHLORIDE SERPL-SCNC: 104 MMOL/L (ref 96–108)
CO2 SERPL-SCNC: 27 MMOL/L (ref 21–32)
CREAT SERPL-MCNC: 0.63 MG/DL (ref 0.6–1.3)
EOSINOPHIL # BLD AUTO: 0.25 THOUSAND/ΜL (ref 0–0.61)
EOSINOPHIL NFR BLD AUTO: 3 % (ref 0–6)
ERYTHROCYTE [DISTWIDTH] IN BLOOD BY AUTOMATED COUNT: 15.5 % (ref 11.6–15.1)
GFR SERPL CREATININE-BSD FRML MDRD: 95 ML/MIN/1.73SQ M
GLUCOSE SERPL-MCNC: 121 MG/DL (ref 65–140)
HCT VFR BLD AUTO: 39.6 % (ref 34.8–46.1)
HGB BLD-MCNC: 12.3 G/DL (ref 11.5–15.4)
IMM GRANULOCYTES # BLD AUTO: 0.03 THOUSAND/UL (ref 0–0.2)
IMM GRANULOCYTES NFR BLD AUTO: 0 % (ref 0–2)
LYMPHOCYTES # BLD AUTO: 1.73 THOUSANDS/ΜL (ref 0.6–4.47)
LYMPHOCYTES NFR BLD AUTO: 19 % (ref 14–44)
MCH RBC QN AUTO: 28.6 PG (ref 26.8–34.3)
MCHC RBC AUTO-ENTMCNC: 31.1 G/DL (ref 31.4–37.4)
MCV RBC AUTO: 92 FL (ref 82–98)
MONOCYTES # BLD AUTO: 0.81 THOUSAND/ΜL (ref 0.17–1.22)
MONOCYTES NFR BLD AUTO: 9 % (ref 4–12)
NEUTROPHILS # BLD AUTO: 6.33 THOUSANDS/ΜL (ref 1.85–7.62)
NEUTS SEG NFR BLD AUTO: 68 % (ref 43–75)
NRBC BLD AUTO-RTO: 0 /100 WBCS
PLATELET # BLD AUTO: 352 THOUSANDS/UL (ref 149–390)
PMV BLD AUTO: 9.9 FL (ref 8.9–12.7)
POTASSIUM SERPL-SCNC: 3.9 MMOL/L (ref 3.5–5.3)
RBC # BLD AUTO: 4.3 MILLION/UL (ref 3.81–5.12)
SODIUM SERPL-SCNC: 139 MMOL/L (ref 135–147)
WBC # BLD AUTO: 9.21 THOUSAND/UL (ref 4.31–10.16)

## 2024-10-20 PROCEDURE — 80348 DRUG SCREENING BUPRENORPHINE: CPT | Performed by: INTERNAL MEDICINE

## 2024-10-20 PROCEDURE — 80345 DRUG SCREENING BARBITURATES: CPT | Performed by: INTERNAL MEDICINE

## 2024-10-20 PROCEDURE — 80366 DRUG SCREENING PREGABALIN: CPT | Performed by: INTERNAL MEDICINE

## 2024-10-20 PROCEDURE — 80307 DRUG TEST PRSMV CHEM ANLYZR: CPT | Performed by: INTERNAL MEDICINE

## 2024-10-20 PROCEDURE — 80355 GABAPENTIN NON-BLOOD: CPT | Performed by: INTERNAL MEDICINE

## 2024-10-20 PROCEDURE — 80365 DRUG SCREENING OXYCODONE: CPT | Performed by: INTERNAL MEDICINE

## 2024-10-20 PROCEDURE — 99233 SBSQ HOSP IP/OBS HIGH 50: CPT | Performed by: INTERNAL MEDICINE

## 2024-10-20 PROCEDURE — 80361 OPIATES 1 OR MORE: CPT | Performed by: INTERNAL MEDICINE

## 2024-10-20 PROCEDURE — 80354 DRUG SCREENING FENTANYL: CPT | Performed by: INTERNAL MEDICINE

## 2024-10-20 PROCEDURE — 80346 BENZODIAZEPINES1-12: CPT | Performed by: INTERNAL MEDICINE

## 2024-10-20 PROCEDURE — 85025 COMPLETE CBC W/AUTO DIFF WBC: CPT | Performed by: INTERNAL MEDICINE

## 2024-10-20 PROCEDURE — 80048 BASIC METABOLIC PNL TOTAL CA: CPT | Performed by: INTERNAL MEDICINE

## 2024-10-20 PROCEDURE — 80362 OPIOIDS & OPIATE ANALOGS 1/2: CPT | Performed by: INTERNAL MEDICINE

## 2024-10-20 RX ORDER — NICOTINE 21 MG/24HR
14 PATCH, TRANSDERMAL 24 HOURS TRANSDERMAL DAILY
Status: DISCONTINUED | OUTPATIENT
Start: 2024-10-21 | End: 2024-10-20

## 2024-10-20 RX ORDER — NICOTINE 21 MG/24HR
14 PATCH, TRANSDERMAL 24 HOURS TRANSDERMAL DAILY
Status: DISCONTINUED | OUTPATIENT
Start: 2024-10-20 | End: 2024-10-28 | Stop reason: HOSPADM

## 2024-10-20 RX ADMIN — METHOCARBAMOL 500 MG: 500 TABLET ORAL at 21:24

## 2024-10-20 RX ADMIN — NYSTATIN: 100000 CREAM TOPICAL at 08:33

## 2024-10-20 RX ADMIN — ACETAMINOPHEN 320 MG: 650 SUSPENSION ORAL at 08:26

## 2024-10-20 RX ADMIN — GABAPENTIN 250 MG: 250 SOLUTION ORAL at 17:22

## 2024-10-20 RX ADMIN — OXYCODONE HYDROCHLORIDE 10 MG: 10 TABLET ORAL at 22:36

## 2024-10-20 RX ADMIN — GABAPENTIN 250 MG: 250 SOLUTION ORAL at 08:32

## 2024-10-20 RX ADMIN — OXYCODONE HYDROCHLORIDE 10 MG: 10 TABLET ORAL at 18:13

## 2024-10-20 RX ADMIN — ACETAMINOPHEN 320 MG: 650 SUSPENSION ORAL at 11:05

## 2024-10-20 RX ADMIN — NYSTATIN: 100000 CREAM TOPICAL at 17:25

## 2024-10-20 RX ADMIN — LORATADINE 10 MG: 10 TABLET ORAL at 08:26

## 2024-10-20 RX ADMIN — GABAPENTIN 250 MG: 250 SOLUTION ORAL at 21:24

## 2024-10-20 RX ADMIN — ENOXAPARIN SODIUM 40 MG: 40 INJECTION SUBCUTANEOUS at 08:26

## 2024-10-20 RX ADMIN — HYDROMORPHONE HYDROCHLORIDE 0.5 MG: 1 INJECTION, SOLUTION INTRAMUSCULAR; INTRAVENOUS; SUBCUTANEOUS at 11:05

## 2024-10-20 RX ADMIN — HYDROMORPHONE HYDROCHLORIDE 0.5 MG: 1 INJECTION, SOLUTION INTRAMUSCULAR; INTRAVENOUS; SUBCUTANEOUS at 23:27

## 2024-10-20 RX ADMIN — ACETAMINOPHEN 320 MG: 650 SUSPENSION ORAL at 17:22

## 2024-10-20 RX ADMIN — QUETIAPINE 150 MG: 150 TABLET, FILM COATED, EXTENDED RELEASE ORAL at 21:25

## 2024-10-20 RX ADMIN — METHOCARBAMOL 500 MG: 500 TABLET ORAL at 08:26

## 2024-10-20 RX ADMIN — SENNOSIDES 8.6 MG: 8.6 TABLET, FILM COATED ORAL at 21:25

## 2024-10-20 RX ADMIN — METHOCARBAMOL 500 MG: 500 TABLET ORAL at 17:22

## 2024-10-20 RX ADMIN — ACETAMINOPHEN 320 MG: 650 SUSPENSION ORAL at 21:24

## 2024-10-20 RX ADMIN — FENTANYL 50 MCG: 50 PATCH, EXTENDED RELEASE TRANSDERMAL at 08:27

## 2024-10-20 RX ADMIN — HYDROMORPHONE HYDROCHLORIDE 0.5 MG: 1 INJECTION, SOLUTION INTRAMUSCULAR; INTRAVENOUS; SUBCUTANEOUS at 20:15

## 2024-10-20 RX ADMIN — OXYCODONE HYDROCHLORIDE 10 MG: 10 TABLET ORAL at 08:26

## 2024-10-20 RX ADMIN — BUPROPION HYDROCHLORIDE 150 MG: 150 TABLET, EXTENDED RELEASE ORAL at 08:26

## 2024-10-20 RX ADMIN — CLONAZEPAM 1 MG: 1 TABLET ORAL at 08:26

## 2024-10-20 RX ADMIN — VENLAFAXINE HYDROCHLORIDE 225 MG: 150 CAPSULE, EXTENDED RELEASE ORAL at 08:32

## 2024-10-20 RX ADMIN — CLONAZEPAM 1 MG: 1 TABLET ORAL at 21:25

## 2024-10-20 RX ADMIN — METHOCARBAMOL 500 MG: 500 TABLET ORAL at 11:05

## 2024-10-20 RX ADMIN — LORAZEPAM 1 MG: 2 INJECTION INTRAMUSCULAR; INTRAVENOUS at 19:08

## 2024-10-20 RX ADMIN — NICOTINE 14 MG: 14 PATCH, EXTENDED RELEASE TRANSDERMAL at 18:13

## 2024-10-20 RX ADMIN — LORAZEPAM 1 MG: 2 INJECTION INTRAMUSCULAR; INTRAVENOUS at 08:27

## 2024-10-20 RX ADMIN — CLONAZEPAM 1 MG: 1 TABLET ORAL at 17:22

## 2024-10-20 NOTE — PLAN OF CARE
Problem: PAIN - ADULT  Goal: Verbalizes/displays adequate comfort level or baseline comfort level  Description: Interventions:  - Encourage patient to monitor pain and request assistance  - Assess pain using appropriate pain scale  - Administer analgesics based on type and severity of pain and evaluate response  - Implement non-pharmacological measures as appropriate and evaluate response  - Consider cultural and social influences on pain and pain management  - Notify physician/advanced practitioner if interventions unsuccessful or patient reports new pain  Outcome: Progressing     Problem: INFECTION - ADULT  Goal: Absence or prevention of progression during hospitalization  Description: INTERVENTIONS:  - Assess and monitor for signs and symptoms of infection  - Monitor lab/diagnostic results  - Monitor all insertion sites, i.e. indwelling lines, tubes, and drains  - Monitor endotracheal if appropriate and nasal secretions for changes in amount and color  - [place] appropriate cooling/warming therapies per order  - Administer medications as ordered  - Instruct and encourage patient and family to use good hand hygiene technique  - Identify and instruct in appropriate isolation precautions for identified infection/condition  Outcome: Progressing

## 2024-10-20 NOTE — ASSESSMENT & PLAN NOTE
MRI in 7/2024   Infectious/inflammatory changes from the occiput to cervical spine as   discussed above. Likely improved fluid collection in the left posterior   paraspinal soft tissues at C1-2 level. Otherwise no significant change from   06/29/2023 including epidural soft tissue thickening and enhancement and fluid   collection along the right aspect of C1-C2 joint and prevertebral space.     Patient went to hospice and completed 3 months of doxycycline  Still complains of pain - palliative care following for assistance with pain management  Pt was discharged for [facility] hospice services due to suspected diversion of narcotics by her sister Jaycee.  Pt was sent to the ED at [facility] for an opinion regarding plan of care  Palliative care and hospice services are following  palliative care service following - changed methadone to fentanyl patch to assist with discharge planning to a facility  Pt reports adequate pain relief at this time  Seen by neuropsychology - pt does not have capacity  PT is not appropriate for PT/OT due to her osteomyelitis  Discharge planning to SNF for long term care, may need a guardian if there are no willing family members.

## 2024-10-20 NOTE — PROGRESS NOTES
Progress Note - Hospitalist   Name: Alyssa Madrigal 64 y.o. female I MRN: [id number]  Unit/Bed#: [facility] 815-01 I Date of Admission: 10/8/2024   Date of Service: 10/20/2024 I Hospital Day: 12     Assessment & Plan  Osteomyelitis (HCC)  MRI in 7/2024   Infectious/inflammatory changes from the occiput to cervical spine as   discussed above. Likely improved fluid collection in the left posterior   paraspinal soft tissues at C1-2 level. Otherwise no significant change from   06/29/2023 including epidural soft tissue thickening and enhancement and fluid   collection along the right aspect of C1-C2 joint and prevertebral space.     Patient went to hospice and completed 3 months of doxycycline  Still complains of pain - palliative care following for assistance with pain management  Pt was discharged for [facility]ice services due to suspected diversion of narcotics by her sister Jaycee.  Pt was sent to the ED at [facility] for an opinion regarding plan of care  Palliative care and hospice services are following  palliative care service following - changed methadone to fentanyl patch to assist with discharge planning to a facility  Pt reports adequate pain relief at this time  Seen by neuropsychology - pt does not have capacity  PT is not appropriate for PT/OT due to her osteomyelitis  Discharge planning to SNF for long term care, may need a guardian if there are no willing family members.  Controlled substance agreement broken  Plan noted above  COPD (chronic obstructive pulmonary disease) with chronic bronchitis (HCC)  Continue albuterol  Major depressive disorder, recurrent episode with anxious distress (HCC)  Continue venlaflaxine  IVDU (intravenous drug user)  Noted in history  Fall  Had a fall out of bed last night on 10/11. CT scans and Xrs are without any new traumatic injury  Fall precautions  Continue analgesics  Post laminectomy syndrome    Posttraumatic stress disorder    Palliative care by specialist    Armando  "dyskinesia    VTE Pharmacologic Prophylaxis:   Pharmacologic: Enoxaparin (Lovenox)  Mechanical VTE Prophylaxis in Place: No    Patient Centered Rounds: I have performed bedside rounds with nursing staff today.      Time Spent for Care: 1 hour.  More than 50% of total time spent on counseling and coordination of care as described above.    Current Length of Stay: 12 day(s)    Current Patient Status: Inpatient       Code Status: Level 3 - DNAR and DNI      Subjective:   nad    Objective:     Vitals:   Temp (24hrs), Av.3 °F (37.4 °C), Min:98.7 °F (37.1 °C), Max:100.3 °F (37.9 °C)    Temp:  [98.7 °F (37.1 °C)-100.3 °F (37.9 °C)] 98.7 °F (37.1 °C)  HR:  [] 100  Resp:  [14-20] 20  BP: (116-118)/(83-85) 117/83  SpO2:  [94 %-97 %] 95 %  Body mass index is 20.06 kg/m².     Input and Output Summary (last 24 hours):       Intake/Output Summary (Last 24 hours) at 10/20/2024 1002  Last data filed at 10/20/2024 0800  Gross per 24 hour   Intake 222 ml   Output 1125 ml   Net -903 ml       Physical Exam:     Physical Exam  Constitutional:       Appearance: Normal appearance.   HENT:      Head: Normocephalic and atraumatic.   Cardiovascular:      Rate and Rhythm: Regular rhythm.   Pulmonary:      Effort: Pulmonary effort is normal.      Breath sounds: Normal breath sounds.   Abdominal:      General: Abdomen is flat.      Palpations: Abdomen is soft.   Neurological:      General: No focal deficit present.      Mental Status: She is alert and oriented to person, place, and time.   Psychiatric:         Mood and Affect: Mood normal.         Additional Data:     Labs:              Invalid input(s): \"LABALBU\"        * I Have Reviewed All Lab Data Listed Above.  * Additional Pertinent Lab Tests Reviewed: All Labs Within Last 24 Hours Reviewed    Recent Cultures (last 7 days):           Last 24 Hours Medication List:   Current Facility-Administered Medications   Medication Dose Route Frequency Provider Last Rate    acetaminophen  " 320 mg Oral 4x Daily (with meals and at bedtime) Edenilson Joshi MD      albuterol  2 puff Inhalation Q4H PRN Rahul Christie MD      buPROPion  150 mg Oral Daily Myra George MD      clonazePAM  1 mg Oral TID Edenilson Joshi MD      enoxaparin  40 mg Subcutaneous Daily Rahul Christie MD      fentaNYL  50 mcg Transdermal Q72H Aftab Ramos MD      gabapentin  250 mg Oral TID Aftab Ramos MD      haloperidol  1 mg Oral BID PRN Rahul Christie MD      HYDROmorphone  0.5 mg Intravenous Q3H PRN Edenilson Joshi MD      loratadine  10 mg Oral Daily Rahul Christie MD      LORazepam  1 mg Intravenous Q6H PRN Edenilson Joshi MD      LORazepam  2 mg Intravenous Q10 Min PRN Edenilson Joshi MD      methocarbamol  500 mg Oral 4x Daily (with meals and at bedtime) Edenilson Joshi MD      naloxone  2 mg Nasal Daily PRN Nona Guajardo MD      nystatin   Topical BID Victorino Thorne MD      ondansetron  4 mg Intravenous Q6H PRN Rahul Christie MD      oxyCODONE  10 mg Oral Q3H PRN Edenilson Joshi MD      oxyCODONE  5 mg Oral Q3H PRN Rahul Christie MD      polyethylene glycol  17 g Oral Daily PRN Hilda Dougherty DO      QUEtiapine  150 mg Oral HS Rahul Christie MD      senna  1 tablet Oral HS Hilda Dougherty DO      venlafaxine  225 mg Oral Daily Rahul Christie MD          Today, Patient Was Seen By: Estee Asencio DO    ** Please Note: Dictation voice to text software may have been used in the creation of this document. **

## 2024-10-21 PROCEDURE — 99233 SBSQ HOSP IP/OBS HIGH 50: CPT | Performed by: INTERNAL MEDICINE

## 2024-10-21 PROCEDURE — 99233 SBSQ HOSP IP/OBS HIGH 50: CPT | Performed by: SURGERY

## 2024-10-21 RX ORDER — SENNOSIDES 8.6 MG
2 TABLET ORAL 2 TIMES DAILY
Status: DISCONTINUED | OUTPATIENT
Start: 2024-10-21 | End: 2024-10-28 | Stop reason: HOSPADM

## 2024-10-21 RX ADMIN — QUETIAPINE 150 MG: 150 TABLET, FILM COATED, EXTENDED RELEASE ORAL at 21:42

## 2024-10-21 RX ADMIN — HYDROMORPHONE HYDROCHLORIDE 0.5 MG: 1 INJECTION, SOLUTION INTRAMUSCULAR; INTRAVENOUS; SUBCUTANEOUS at 15:27

## 2024-10-21 RX ADMIN — HYDROMORPHONE HYDROCHLORIDE 0.5 MG: 1 INJECTION, SOLUTION INTRAMUSCULAR; INTRAVENOUS; SUBCUTANEOUS at 23:50

## 2024-10-21 RX ADMIN — VENLAFAXINE HYDROCHLORIDE 225 MG: 150 CAPSULE, EXTENDED RELEASE ORAL at 09:07

## 2024-10-21 RX ADMIN — METHOCARBAMOL 500 MG: 500 TABLET ORAL at 11:03

## 2024-10-21 RX ADMIN — OXYCODONE HYDROCHLORIDE 10 MG: 10 TABLET ORAL at 18:27

## 2024-10-21 RX ADMIN — NYSTATIN: 100000 CREAM TOPICAL at 18:30

## 2024-10-21 RX ADMIN — HYDROMORPHONE HYDROCHLORIDE 0.5 MG: 1 INJECTION, SOLUTION INTRAMUSCULAR; INTRAVENOUS; SUBCUTANEOUS at 11:03

## 2024-10-21 RX ADMIN — LORATADINE 10 MG: 10 TABLET ORAL at 09:05

## 2024-10-21 RX ADMIN — ACETAMINOPHEN 320 MG: 650 SUSPENSION ORAL at 15:22

## 2024-10-21 RX ADMIN — METHOCARBAMOL 500 MG: 500 TABLET ORAL at 15:22

## 2024-10-21 RX ADMIN — NICOTINE 14 MG: 14 PATCH, EXTENDED RELEASE TRANSDERMAL at 09:13

## 2024-10-21 RX ADMIN — OXYCODONE HYDROCHLORIDE 10 MG: 10 TABLET ORAL at 21:45

## 2024-10-21 RX ADMIN — ACETAMINOPHEN 320 MG: 650 SUSPENSION ORAL at 21:40

## 2024-10-21 RX ADMIN — OXYCODONE HYDROCHLORIDE 10 MG: 10 TABLET ORAL at 09:05

## 2024-10-21 RX ADMIN — OXYCODONE HYDROCHLORIDE 10 MG: 10 TABLET ORAL at 13:19

## 2024-10-21 RX ADMIN — NYSTATIN: 100000 CREAM TOPICAL at 09:07

## 2024-10-21 RX ADMIN — HYDROMORPHONE HYDROCHLORIDE 0.5 MG: 1 INJECTION, SOLUTION INTRAMUSCULAR; INTRAVENOUS; SUBCUTANEOUS at 19:51

## 2024-10-21 RX ADMIN — METHOCARBAMOL 500 MG: 500 TABLET ORAL at 21:42

## 2024-10-21 RX ADMIN — ENOXAPARIN SODIUM 40 MG: 40 INJECTION SUBCUTANEOUS at 09:05

## 2024-10-21 RX ADMIN — GABAPENTIN 250 MG: 250 SOLUTION ORAL at 15:23

## 2024-10-21 RX ADMIN — LORAZEPAM 1 MG: 2 INJECTION INTRAMUSCULAR; INTRAVENOUS at 23:50

## 2024-10-21 RX ADMIN — GABAPENTIN 250 MG: 250 SOLUTION ORAL at 09:12

## 2024-10-21 RX ADMIN — SENNOSIDES 17.2 MG: 8.6 TABLET, FILM COATED ORAL at 18:27

## 2024-10-21 RX ADMIN — BUPROPION HYDROCHLORIDE 150 MG: 150 TABLET, EXTENDED RELEASE ORAL at 09:05

## 2024-10-21 RX ADMIN — LORAZEPAM 1 MG: 2 INJECTION INTRAMUSCULAR; INTRAVENOUS at 15:27

## 2024-10-21 RX ADMIN — GABAPENTIN 250 MG: 250 SOLUTION ORAL at 21:41

## 2024-10-21 RX ADMIN — CLONAZEPAM 1 MG: 1 TABLET ORAL at 09:06

## 2024-10-21 RX ADMIN — CLONAZEPAM 1 MG: 1 TABLET ORAL at 21:41

## 2024-10-21 RX ADMIN — ACETAMINOPHEN 320 MG: 650 SUSPENSION ORAL at 11:03

## 2024-10-21 RX ADMIN — CLONAZEPAM 1 MG: 1 TABLET ORAL at 15:22

## 2024-10-21 NOTE — CERTIFIED RECOVERY SPECIALIST
"   Certified  Note    Patient name: Alyssa Madrigal  Location: [facility] 815/[facility] 815-01  [place]: [facility]  Attending:  Estee Asencio DO MRN [id number]  : 1960  Age: 64 y.o.    Sex: female Date 10/21/2024         Substance Use History:     Social History     Substance and Sexual Activity   Alcohol Use Not Currently        Social History     Substance and Sexual Activity   Drug Use Not Currently    Types: Heroin, Marijuana    Comment: heroin: 19  marijuana: 19       Time spent: 20 min  Consult rcvd: Y  Patient seen in: IP  Stage of change:  Contemplation     Substance used: Fentanyl   Frequency/quantity : amount varies patient unclear   Last use:time frame varies patient unclear        CRS provided introductions and explanation of service. CRS and patient engaged in conversation of hospitalization. Patient discussed needs she feels would be appropriate. CRS shared understanding.     Patient presented to hospital after being Dc'd from  hospice. Patient has a hx of CORNLEIUS and non compliance with hospice.     Patient and CRS discussed patients current living situation and health. Patient was pleasant and happy and then became tearful when speaking of her  daughter. Patient informed CRS that she wants to get better and she's doing it for her daughter.     Patient appeared confused at times with reality as she stated her sister was in her house \"stealing all of her money\" as she was \"watching on her phone\". Patient and CM confirmed patient does not have phone personal phone in room nor does she have cameras at home.     CRS will continue to follow and support throughout hospital stay until discharge    Resources and contact information given     -      Carina Low       "

## 2024-10-21 NOTE — RESTORATIVE TECHNICIAN NOTE
Restorative Technician Note      Patient Name: Alyssa Madrigal     Restorative Tech Visit Date: 10/21/24  Note Type: Mobility  Patient Position Upon Consult: Supine  Activity Performed: Dangled; Stood; Transferred  Assistive Device: Other (Comment) (HAx2 with b/l knee block)  Education Provided: Yes  Patient Position at End of Consult: Bedside chair; All needs within reach; Bed/Chair alarm activated    Lindsay Maddox  DPT, Restorative Technician             regular diet with thin fluids

## 2024-10-21 NOTE — PROGRESS NOTES
Progress Note - Palliative & Supportive Care  Alyssa Madrigal  64 y.o.  female  [facility] 815-[facility]   MRN: [id number]  Encounter: [id number]     Assessment/Plan:  Osteomyelitis  COPD  [place] dyskinesia  Post laminectomy syndrome  Major depressive disorder  Anxiety  Constipation   H/o IVDU; drug screen, see RN note 10/20  Palliative care encounter  Goals of care  -continue current medical care  -pain regimen - no changes   -tylenol oral susp 350mg 4x/day melonie   -robaxin 500mg 4x daily   -fentanyl 50mcg/hr td patch q72h, due for patch change 10/23   -oxyIR 5-10mg po q3h prn mod-severe pain   -dilaudid 0.5mg IV q3h prn bt pain  -bowel regimen   -inc senna to 2 tabs bid   -cont miralax daily prn constipation  -anxiety, depression -no changes   -bupropion 150mg daily   -clonazepam 1mg po tid   -gabapentin oral soln 250mg po tid  -Quetiapine 150mg daily at bedtime  -venlafaxine 225mg po daily  -d/w CM, awaiting guardianship  -will follow intermittently as needed, please tigertext palliative care on call with questions/concerns    Code status: Level 3, DNAR/DNI    Subjective:  Patient seen and examined. Resting comfortably, easily awakens to voice. Denies pain. Confused.     Received:  Fentanyl TD 50mcg/h q72h + oxyIR 30mg po + hydromorphone 1.5mg IV/last 24h = 155mg approx OME    2x doses of lorazepam 1mg IV    Last BM: 10/17/24    Medications    Current Facility-Administered Medications:     acetaminophen (TYLENOL) oral suspension 320 mg, 320 mg, Oral, 4x Daily (with meals and at bedtime), Edenilson Joshi MD, 320 mg at 10/20/24 2124    albuterol (PROVENTIL HFA,VENTOLIN HFA) inhaler 2 puff, 2 puff, Inhalation, Q4H PRN, Rahul Christie MD    buPROPion (WELLBUTRIN XL) 24 hr tablet 150 mg, 150 mg, Oral, Daily, Myra George MD, 150 mg at 10/21/24 0905    clonazePAM (KlonoPIN) tablet 1 mg, 1 mg, Oral, TID, Edenilson Joshi MD, 1 mg at 10/21/24 0906    enoxaparin (LOVENOX) subcutaneous injection 40 mg, 40 mg,  Subcutaneous, Daily, Rahul Christie MD, 40 mg at 10/21/24 0905    fentaNYL (DURAGESIC) 50 mcg/hr TD 72 hr patch 50 mcg, 50 mcg, Transdermal, Q72H, Aftab Ramos MD, 50 mcg at 10/20/24 0827    gabapentin (NEURONTIN) oral solution 250 mg, 250 mg, Oral, TID, Aftab Ramos MD, 250 mg at 10/20/24 2124    haloperidol (HALDOL) tablet 1 mg, 1 mg, Oral, BID PRN, Rahul Christie MD, 1 mg at 10/17/24 0535    HYDROmorphone (DILAUDID) injection 0.5 mg, 0.5 mg, Intravenous, Q3H PRN, Edenilson Joshi MD, 0.5 mg at 10/20/24 2327    loratadine (CLARITIN) tablet 10 mg, 10 mg, Oral, Daily, Rahul Christie MD, 10 mg at 10/21/24 0905    LORazepam (ATIVAN) injection 1 mg, 1 mg, Intravenous, Q6H PRN, Edenilson Joshi MD, 1 mg at 10/20/24 1908    LORazepam (ATIVAN) injection 2 mg, 2 mg, Intravenous, Q10 Min PRN, Edenilson Joshi MD    methocarbamol (ROBAXIN) tablet 500 mg, 500 mg, Oral, 4x Daily (with meals and at bedtime), Edenilson Joshi MD, 500 mg at 10/21/24 0905    naloxone (NARCAN) intranasal 2 mg, 2 mg, Nasal, Daily PRN, Nona Guajardo MD    nicotine (NICODERM CQ) 14 mg/24hr TD 24 hr patch 14 mg, 14 mg, Transdermal, Daily, Herminia White DO, 14 mg at 10/20/24 1813    nystatin (MYCOSTATIN) cream, , Topical, BID, Victorino Thorne MD, Given at 10/20/24 1725    ondansetron (ZOFRAN) injection 4 mg, 4 mg, Intravenous, Q6H PRN, Rahul Christie MD    oxyCODONE (ROXICODONE) immediate release tablet 10 mg, 10 mg, Oral, Q3H PRN, Edenilson Joshi MD, 10 mg at 10/21/24 0905    oxyCODONE (ROXICODONE) IR tablet 5 mg, 5 mg, Oral, Q3H PRN, Rahul Christie MD, 5 mg at 10/17/24 0539    polyethylene glycol (MIRALAX) packet 17 g, 17 g, Oral, Daily PRN, Hilda Mann CrouseDO, 17 g at 10/16/24 1214    QUEtiapine (SEROquel XR) 24 hr tablet 150 mg, 150 mg, Oral, HS, Rahul Christie MD, 150 mg at 10/20/24 2125    senna (SENOKOT) tablet 8.6 mg, 1 tablet, Oral, HS, Hilda Mann  "Gardner, DO, 8.6 mg at 10/20/24 2125    venlafaxine (EFFEXOR-XR) 24 hr capsule 225 mg, 225 mg, Oral, Daily, Rahul Christie MD, 225 mg at 10/20/24 0832    Objective:  /78   Pulse 105   Temp 97.5 °F (36.4 °C)   Resp 16   Ht 5' 4\" (1.626 m)   Wt 53.2 kg (117 lb 4.6 oz)   SpO2 93%   BMI 20.13 kg/m²   Physical Exam:  General: nad, resting comfortably, easily awakens to voice  Neurological: aa, oriented to self  Cardiovascular: tachy  Respiratory: normal effort, no distress  Gastrointestinal: nd  Musculoskeletal: no edema  Skin: warm, dry  Psychiatric: confused, anxious at times    Lab Results: I have personally reviewed pertinent labs., CBC: No results found for: \"WBC\", \"HGB\", \"HCT\", \"MCV\", \"PLT\", \"ADJUSTEDWBC\", \"RBC\", \"MCH\", \"MCHC\", \"RDW\", \"MPV\", \"NRBC\", CMP: No results found for: \"SODIUM\", \"K\", \"CL\", \"CO2\", \"ANIONGAP\", \"BUN\", \"CREATININE\", \"GLUCOSE\", \"CALCIUM\", \"AST\", \"ALT\", \"ALKPHOS\", \"PROT\", \"BILITOT\", \"EGFR\", BMP:No results found for: \"SODIUM\", \"K\", \"CL\", \"CO2\", \"BUN\", \"CREATININE\", \"GLUC\", \"GLUF\", \"CALCIUM\", \"AGAP\", \"EGFR\", PT/PTT:No results found for: \"PT\", \"PTT\"    Counseling / Coordination of Care  Total floor / unit time spent today 25 minutes.  Greater than 50% of total time was spent with the patient and / or family counseling and / or coordinating of care. A description of the counseling / coordination of care: current condition, symptom management, goals of care, support, await guardianship.     Hilda Dougherty, DO  Palliative & Supportive Care   "

## 2024-10-21 NOTE — NURSING NOTE
PCA found a spoon in patient's room with unknown substance on it. Spoon was taken and discarded. Room search was done by security and supervisor, Aliza Moya made aware.

## 2024-10-21 NOTE — ASSESSMENT & PLAN NOTE
MRI in 7/2024   Infectious/inflammatory changes from the occiput to cervical spine as   discussed above. Likely improved fluid collection in the left posterior   paraspinal soft tissues at C1-2 level. Otherwise no significant change from   06/29/2023 including epidural soft tissue thickening and enhancement and fluid   collection along the right aspect of C1-C2 joint and prevertebral space.     Patient went to hospice and completed 3 months of doxycycline  Still complains of pain - palliative care following for assistance with pain management  Pt was discharged for [facility]ice services due to suspected diversion of narcotics by her sister Jaycee.  Pt was sent to the ED at [facility] for an opinion regarding plan of care  Palliative care and hospice services are following  palliative care service following - changed methadone to fentanyl patch to assist with discharge planning to a facility  Pt reports adequate pain relief at this time  Seen by neuropsychology - pt does not have capacity  PT is not appropriate for PT/OT due to her osteomyelitis  Discharge planning to SNF for long term care,  Will need guardianship  Of note, mother asking for reevaluation by neurosurgery and also by neuropsych.  Patient more lucid compared to previous eval.  Will continue to monitor.

## 2024-10-21 NOTE — PLAN OF CARE
Problem: PAIN - ADULT  Goal: Verbalizes/displays adequate comfort level or baseline comfort level  Description: Interventions:  - Encourage patient to monitor pain and request assistance  - Assess pain using appropriate pain scale  - Administer analgesics based on type and severity of pain and evaluate response  - Implement non-pharmacological measures as appropriate and evaluate response  - Consider cultural and social influences on pain and pain management  - Notify physician/advanced practitioner if interventions unsuccessful or patient reports new pain  Outcome: Progressing     Problem: INFECTION - ADULT  Goal: Absence or prevention of progression during hospitalization  Description: INTERVENTIONS:  - Assess and monitor for signs and symptoms of infection  - Monitor lab/diagnostic results  - Monitor all insertion sites, i.e. indwelling lines, tubes, and drains  - Monitor endotracheal if appropriate and nasal secretions for changes in amount and color  - [place] appropriate cooling/warming therapies per order  - Administer medications as ordered  - Instruct and encourage patient and family to use good hand hygiene technique  - Identify and instruct in appropriate isolation precautions for identified infection/condition  Outcome: Progressing

## 2024-10-21 NOTE — UTILIZATION REVIEW
Requesting reconsideration as original clinical was submitted on 10/10/2024 1529 . As of 10/21/24 patient remains IH. Please see attached fax confirmation and initial clinical         Initial Clinical Review     Admission: Date/Time/Statement:   Admission Orders (From admission, onward)          Ordered         10/08/24 2010   INPATIENT ADMISSION  Once                                     Orders Placed This Encounter   Procedures    INPATIENT ADMISSION       Standing Status:   Standing       Number of Occurrences:   1       Order Specific Question:   Level of Care       Answer:   Med Surg [16]       Order Specific Question:   Estimated length of stay       Answer:   More than 2 Midnights       Order Specific Question:   Certification       Answer:   I certify that inpatient services are medically necessary for this patient for a duration of greater than two midnights. See H&P and MD Progress Notes for additional information about the patient's course of treatment.      ED Arrival Information         Expected   -    Arrival   10/8/2024 17:30    Acuity   Urgent                 Means of arrival   Ambulance    Escorted by   [facility]    Service   Hospitalist    Admission type   Emergency                 Arrival complaint   Medical Problem                     Chief Complaint   Patient presents with    Medical Problem       Per EMS patient being brought for admission to hospice            Initial Presentation: 64 y.o. 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 She had uptitration of meds in Hospice. Patient was found to disperse medication at her IPU, as a result was brought into the ED due to unsafe discharge.       Plan:  Admit Inpatient status to med surg dt Osteomyelitis :   Neurosurgery and Palliative Care consults, conitnue Bupropion, Clonazepam, Haloperidol prn, Ativan prn, methadone, oxycodone prn, seroquel and venlaflaxine. PT OT eval, IO and daily wts.      Anticipated Length of Stay/Certification Statement: Patient will be admitted on an inpatient basis with an anticipated length of stay of greater than 2 midnights secondary to Osteomyelitis.      Date: 10/9   Day 2:  Pt disoriented. She is emotional and asking for analgesics. Continue above tx plan including monitor off abx for now, Palliative Care following. Continue home meds. PT OT, CM following.      Date: 10/10  Day 3: Has surpassed a 2nd midnight with active treatments and services. No new concerns or complaints. CM following, family dynamics with alleged med diversion. Continue to monitor mental status, VS and labs, PT OT.      ED Treatment-Medication Administration from 10/08/2024 1730 to 10/09/2024 1028           Date/Time Order Dose Route Action       10/09/2024 0845 buPROPion (WELLBUTRIN XL) 24 hr tablet 300 mg 300 mg Oral Given       10/09/2024 0845 loratadine (CLARITIN) tablet 10 mg 10 mg Oral Given       10/09/2024 0845 methadone (Dolophine) tablet 5 mg 5 mg Oral Given       10/09/2024 0845 venlafaxine (EFFEXOR-XR) 24 hr capsule 225 mg 225 mg Oral Given       10/09/2024 0845 enoxaparin (LOVENOX) subcutaneous injection 40 mg 40 mg Subcutaneous Given                Scheduled Medications:    Scheduled Medications ONLY (does not pull in infusions nor PRN medications order   acetaminophen, 975 mg, Oral, Q8H  buPROPion, 300 mg, Oral, Daily  enoxaparin,  40 mg, Subcutaneous, Daily  loratadine, 10 mg, Oral, Daily  methadone, 5 mg, Oral, BID  QUEtiapine, 150 mg, Oral, HS  venlafaxine, 225 mg, Oral, Daily         Continuous IV Infusions: none  Infusions Meds - Displays dose, route, & frequency only         PRN Meds:  albuterol, 2 puff, Inhalation, Q4H PRN  clonazePAM, 1 mg, Oral, TID PRN  haloperidol, 1 mg, Oral, BID PRN x1  HYDROmorphone, 0.5 mg, Intravenous, Q6H PRN  LORazepam, 1 mg, Oral, Q8H PRN x1  ondansetron, 4 mg, Intravenous, Q6H PRN  oxyCODONE, 10 mg, Oral, Q6H PRN x1  oxyCODONE, 5 mg, Oral, Q3H PRN x1                ED Triage Vitals   Temperature Pulse Respirations Blood Pressure SpO2 Pain Score   10/08/24 1737 10/08/24 1737 10/08/24 1752 10/08/24 1737 10/08/24 1737 10/09/24 1237   98.1 °F (36.7 °C) 79 18 118/79 99 % 8      Weight (last 2 days)         Date/Time Weight     10/09/24 15:07:29 52.8 (116.3)                Vital Signs (last 3 days)         Date/Time Temp Pulse Resp BP MAP (mmHg) SpO2 O2 Device Patient Position - Orthostatic VS Pain     10/10/24 1235 -- -- -- -- -- -- -- -- 10 - Worst Possible Pain     10/10/24 0922 -- -- -- -- -- -- -- -- 10 - Worst Possible Pain     10/10/24 0730 -- -- -- -- -- -- None (Room air) -- No Pain     10/10/24 07:12:34 98.8 °F (37.1 °C) -- 12 137/91 106 -- -- -- --     10/10/24 0056 -- -- -- 106/70 -- -- -- Lying --     10/10/24 00:48:18 98.7 °F (37.1 °C) 91 16 99/69 79 95 % -- -- --     10/09/24 1902 -- -- -- -- -- -- -- -- No Pain     10/09/24 1605 -- -- -- -- -- -- -- -- 10 - Worst Possible Pain     10/09/24 15:07:29 98.9 °F (37.2 °C) -- 14 125/88 100 -- None (Room air) -- No Pain     10/09/24 15:07:02 98.9 °F (37.2 °C) 64 14 125/88 100 -- -- -- No Pain     10/09/24 1403 -- 106 20 144/108 122 98 % -- -- --     10/09/24 1237 -- -- -- -- -- -- -- -- 8     10/09/24 1000 -- 106 20 132/88 105 98 % -- -- --     10/09/24 0700 -- 92 18 127/83 -- 95 % -- -- --     10/09/24 0200 -- 84 18 110/74 88 95 % None (Room air) Lying  --     10/09/24 0100 -- 88 18 103/69 81 96 % None (Room air) Lying --     10/09/24 0000 -- 98 18 139/81 102 98 % None (Room air) Lying --     10/08/24 2200 -- 68 18 132/86 105 97 % None (Room air) Lying --     10/08/24 1752 -- -- 18 -- -- -- -- -- --     10/08/24 1737 98.1 °F (36.7 °C) 79 -- 118/79 -- 99 % None (Room air) Sitting --                   Pertinent Labs/Diagnostic Test Results:   Radiology:  No orders to display      Cardiology:  No orders to display      GI:  No orders to display                   Results from last 7 days   Lab Units 10/09/24  0417 10/08/24  2053   WBC Thousand/uL  --  10.62*   HEMOGLOBIN g/dL  --  13.7   HEMATOCRIT %  --  45.4   PLATELETS Thousands/uL 365 362   TOTAL NEUT ABS Thousands/µL  --  6.53                Results from last 7 days   Lab Units 10/09/24  0736 10/09/24  0417   SODIUM mmol/L 138 137   POTASSIUM mmol/L 3.3* 3.2*   CHLORIDE mmol/L 104 104   CO2 mmol/L 25 25   ANION GAP mmol/L 9 8   BUN mg/dL 23 22   CREATININE mg/dL 0.81 0.75   EGFR ml/min/1.73sq m 77 85   CALCIUM mg/dL 8.8 8.1*            Results from last 7 days   Lab Units 10/09/24  0736 10/09/24  0417   AST U/L 13 15   ALT U/L 12 10   ALK PHOS U/L 123* 111*   TOTAL PROTEIN g/dL 6.0* 5.3*   ALBUMIN g/dL 3.1* 2.8*   TOTAL BILIRUBIN mg/dL 0.44 0.38            Results from last 7 days   Lab Units 10/08/24  2034 10/08/24  1947   POC GLUCOSE mg/dl 69 60*            Results from last 7 days   Lab Units 10/09/24  0736 10/09/24  0417   GLUCOSE RANDOM mg/dL 81 68               Results from last 7 days   Lab Units 10/09/24  0000   LACTIC ACID mmol/L 0.8            Results from last 7 days   Lab Units 10/09/24  0000 10/08/24  2053   BLOOD CULTURE   No Growth at 24 hrs. No Growth at 24 hrs.      Medical History        Past Medical History:   Diagnosis Date    Back pain      Drug use      Heroin abuse (HCC)      Neck pain           Present on Admission:   COPD (chronic obstructive pulmonary disease) with chronic bronchitis  (HCC)   IVDU (intravenous drug user)   Osteomyelitis (HCC)   Major depressive disorder, recurrent episode with anxious distress (HCC)        Admitting Diagnosis: Hospice care [Z51.5]  Known medical problems [Z78.9]  Acute hematogenous osteomyelitis, unspecified site (HCC) [M86.00]  Age/Sex: 64 y.o. female     Network Utilization Review Department  ATTENTION: Please call with any questions or concerns to [phone] and carefully listen to the prompts so that you are directed to the right person. All voicemails are confidential.   For Discharge needs, contact Care Management DC Support Team at [phone] opt. 2  Send all requests for admission clinical reviews, approved or denied determinations and any other requests to dedicated fax number below belonging to the campus where the patient is receiving treatment. List of dedicated fax numbers for the Facilities:  FACILITY NAME UR FAX NUMBER   ADMISSION DENIALS (Administrative/Medical Necessity) [phone]   DISCHARGE SUPPORT TEAM (NETWORK) [phone]   PARENT CHILD HEALTH (Maternity/NICU/Pediatrics) [phone]   [facility] [phone]   [facility] [phone]   [facility] [phone]   [facility] [phone]   [facility] [phone]   [facility] [phone]   [facility] [phone]   [facility] [phone]   [facility] [phone]   [facility] [phone]   [facility] [phone]   [place] [phone]

## 2024-10-21 NOTE — QUICK NOTE
Was notified by nurse, non-hospital spoon was found in pt room  with an unknown substance on it. There is no change inh behaivior noticed by nursing staff.  The pt is bed bound. Urine drug screen ordered.

## 2024-10-21 NOTE — PROGRESS NOTES
Progress Note - Hospitalist   Name: Alyssa Madrigal 64 y.o. female I MRN: [id number]  [facility] Date of Admission: 10/8/2024   Date of Service: 10/21/2024 I Hospital Day: 13     Assessment & Plan  Osteomyelitis (HCC)  MRI in 7/2024   Infectious/inflammatory changes from the occiput to cervical spine as   discussed above. Likely improved fluid collection in the left posterior   paraspinal soft tissues at C1-2 level. Otherwise no significant change from   06/29/2023 including epidural soft tissue thickening and enhancement and fluid   collection along the right aspect of C1-C2 joint and prevertebral space.     Patient went to hospice and completed 3 months of doxycycline  Still complains of pain - palliative care following for assistance with pain management  Pt was discharged for [facility] hospice services due to suspected diversion of narcotics by her sister Jaycee.  Pt was sent to the ED at [facility] for an opinion regarding plan of care  Palliative care and hospice services are following  palliative care service following - changed methadone to fentanyl patch to assist with discharge planning to a facility  Pt reports adequate pain relief at this time  Seen by neuropsychology - pt does not have capacity  PT is not appropriate for PT/OT due to her osteomyelitis  Discharge planning to SNF for long term care,  Will need guardianship  Of note, mother asking for reevaluation by neurosurgery and also by neuropsych.  Patient more lucid compared to previous eval.  Will continue to monitor.  Controlled substance agreement broken  Plan noted above  COPD (chronic obstructive pulmonary disease) with chronic bronchitis (HCC)  Continue albuterol  Major depressive disorder, recurrent episode with anxious distress (HCC)  Continue venlaflaxine  IVDU (intravenous drug user)  Noted in history  Fall  Had a fall out of bed last night on 10/11. CT scans and Xrs are without any new traumatic injury  Fall precautions  Continue  analgesics  Post laminectomy syndrome    Posttraumatic stress disorder    Palliative care by specialist    Tardive dyskinesia      VTE Pharmacologic Prophylaxis:   Pharmacologic: Enoxaparin (Lovenox)  Mechanical VTE Prophylaxis in Place: No    Patient Centered Rounds: I have performed bedside rounds with nursing staff today.    Time Spent for Care: 1 hour.  More than 50% of total time spent on counseling and coordination of care as described above.    Current Length of Stay: 13 day(s)    Current Patient Status: Inpatient       Code Status: Level 3 - DNAR and DNI      Subjective:   nad    Objective:     Vitals:   Temp (24hrs), Av.4 °F (36.9 °C), Min:97.5 °F (36.4 °C), Max:99.3 °F (37.4 °C)    Temp:  [97.5 °F (36.4 °C)-99.3 °F (37.4 °C)] 97.5 °F (36.4 °C)  HR:  [105-118] 105  Resp:  [16-18] 16  BP: (116-138)/(78-95) 118/78  SpO2:  [93 %-96 %] 93 %  Body mass index is 20.13 kg/m².     Input and Output Summary (last 24 hours):       Intake/Output Summary (Last 24 hours) at 10/21/2024 0932  Last data filed at 10/21/2024 0922  Gross per 24 hour   Intake 238 ml   Output 925 ml   Net -687 ml       Physical Exam:     Physical Exam  Constitutional:       Appearance: Normal appearance.   HENT:      Head: Normocephalic and atraumatic.   Cardiovascular:      Rate and Rhythm: Normal rate and regular rhythm.   Abdominal:      General: Abdomen is flat.      Palpations: Abdomen is soft.   Skin:     General: Skin is warm and dry.   Neurological:      General: No focal deficit present.      Mental Status: She is alert and oriented to person, place, and time.   Psychiatric:         Mood and Affect: Mood normal.         Additional Data:     Labs:    Results from last 7 days   Lab Units 10/20/24  1022   WBC Thousand/uL 9.21   HEMOGLOBIN g/dL 12.3   HEMATOCRIT % 39.6   PLATELETS Thousands/uL 352   SEGS PCT % 68   LYMPHO PCT % 19   MONO PCT % 9   EOS PCT % 3     Results from last 7 days   Lab Units 10/20/24  1022   POTASSIUM mmol/L 3.9    CHLORIDE mmol/L 104   CO2 mmol/L 27   BUN mg/dL 13   CREATININE mg/dL 0.63   CALCIUM mg/dL 9.1           Recent Cultures (last 7 days):           Last 24 Hours Medication List:   Current Facility-Administered Medications   Medication Dose Route Frequency Provider Last Rate    acetaminophen  320 mg Oral 4x Daily (with meals and at bedtime) Edenilson Joshi MD      albuterol  2 puff Inhalation Q4H PRN Rahul Christie MD      buPROPion  150 mg Oral Daily Myra George MD      clonazePAM  1 mg Oral TID Edenilson Joshi MD      enoxaparin  40 mg Subcutaneous Daily Rahul Christie MD      fentaNYL  50 mcg Transdermal Q72H Aftab Ramos MD      gabapentin  250 mg Oral TID Aftab Ramos MD      haloperidol  1 mg Oral BID PRN Rahul Christie MD      HYDROmorphone  0.5 mg Intravenous Q3H PRN Edenilson Joshi MD      loratadine  10 mg Oral Daily Rahul Christie MD      LORazepam  1 mg Intravenous Q6H PRN Edenilson Joshi MD      LORazepam  2 mg Intravenous Q10 Min PRN Edenilson Joshi MD      methocarbamol  500 mg Oral 4x Daily (with meals and at bedtime) Edenilson Joshi MD      naloxone  2 mg Nasal Daily PRN Nona Guajardo MD      nicotine  14 mg Transdermal Daily Herminia White DO      nystatin   Topical BID Victorino Thorne MD      ondansetron  4 mg Intravenous Q6H PRN Rahul Christie MD      oxyCODONE  10 mg Oral Q3H PRN Edenilson Joshi MD      oxyCODONE  5 mg Oral Q3H PRN Rahul Christie MD      polyethylene glycol  17 g Oral Daily PRN Hilda Dougherty DO      QUEtiapine  150 mg Oral HS Rahul Christie MD      senna  1 tablet Oral HS Hilda Dougherty DO      venlafaxine  225 mg Oral Daily Rahul Christie MD          Today, Patient Was Seen By: Estee Asencio DO    ** Please Note: Dictation voice to text software may have been used in the creation of this document. **

## 2024-10-22 PROCEDURE — 99233 SBSQ HOSP IP/OBS HIGH 50: CPT | Performed by: INTERNAL MEDICINE

## 2024-10-22 PROCEDURE — 99232 SBSQ HOSP IP/OBS MODERATE 35: CPT | Performed by: FAMILY MEDICINE

## 2024-10-22 PROCEDURE — 99233 SBSQ HOSP IP/OBS HIGH 50: CPT | Performed by: PHYSICIAN ASSISTANT

## 2024-10-22 RX ORDER — HALOPERIDOL 5 MG/ML
2 INJECTION INTRAMUSCULAR ONCE
Status: COMPLETED | OUTPATIENT
Start: 2024-10-22 | End: 2024-10-22

## 2024-10-22 RX ADMIN — LORAZEPAM 1 MG: 2 INJECTION INTRAMUSCULAR; INTRAVENOUS at 18:15

## 2024-10-22 RX ADMIN — HYDROMORPHONE HYDROCHLORIDE 0.5 MG: 1 INJECTION, SOLUTION INTRAMUSCULAR; INTRAVENOUS; SUBCUTANEOUS at 17:35

## 2024-10-22 RX ADMIN — BUPROPION HYDROCHLORIDE 150 MG: 150 TABLET, EXTENDED RELEASE ORAL at 09:02

## 2024-10-22 RX ADMIN — GABAPENTIN 250 MG: 250 SOLUTION ORAL at 21:14

## 2024-10-22 RX ADMIN — METHOCARBAMOL 500 MG: 500 TABLET ORAL at 21:13

## 2024-10-22 RX ADMIN — ACETAMINOPHEN 320 MG: 650 SUSPENSION ORAL at 15:57

## 2024-10-22 RX ADMIN — SENNOSIDES 17.2 MG: 8.6 TABLET, FILM COATED ORAL at 09:02

## 2024-10-22 RX ADMIN — ENOXAPARIN SODIUM 40 MG: 40 INJECTION SUBCUTANEOUS at 09:02

## 2024-10-22 RX ADMIN — LORATADINE 10 MG: 10 TABLET ORAL at 09:02

## 2024-10-22 RX ADMIN — CLONAZEPAM 1 MG: 1 TABLET ORAL at 15:57

## 2024-10-22 RX ADMIN — OXYCODONE HYDROCHLORIDE 10 MG: 10 TABLET ORAL at 19:52

## 2024-10-22 RX ADMIN — CLONAZEPAM 1 MG: 1 TABLET ORAL at 21:13

## 2024-10-22 RX ADMIN — VENLAFAXINE HYDROCHLORIDE 225 MG: 150 CAPSULE, EXTENDED RELEASE ORAL at 09:03

## 2024-10-22 RX ADMIN — METHOCARBAMOL 500 MG: 500 TABLET ORAL at 15:57

## 2024-10-22 RX ADMIN — SENNOSIDES 17.2 MG: 8.6 TABLET, FILM COATED ORAL at 17:01

## 2024-10-22 RX ADMIN — NICOTINE 14 MG: 14 PATCH, EXTENDED RELEASE TRANSDERMAL at 09:13

## 2024-10-22 RX ADMIN — GABAPENTIN 250 MG: 250 SOLUTION ORAL at 15:56

## 2024-10-22 RX ADMIN — GABAPENTIN 250 MG: 250 SOLUTION ORAL at 09:15

## 2024-10-22 RX ADMIN — QUETIAPINE 150 MG: 150 TABLET, FILM COATED, EXTENDED RELEASE ORAL at 21:13

## 2024-10-22 RX ADMIN — ACETAMINOPHEN 320 MG: 650 SUSPENSION ORAL at 12:03

## 2024-10-22 RX ADMIN — ACETAMINOPHEN 320 MG: 650 SUSPENSION ORAL at 21:14

## 2024-10-22 RX ADMIN — NYSTATIN 1 APPLICATION: 100000 CREAM TOPICAL at 10:48

## 2024-10-22 RX ADMIN — ACETAMINOPHEN 320 MG: 650 SUSPENSION ORAL at 07:52

## 2024-10-22 RX ADMIN — HYDROMORPHONE HYDROCHLORIDE 0.5 MG: 1 INJECTION, SOLUTION INTRAMUSCULAR; INTRAVENOUS; SUBCUTANEOUS at 21:14

## 2024-10-22 RX ADMIN — CLONAZEPAM 1 MG: 1 TABLET ORAL at 09:02

## 2024-10-22 RX ADMIN — HALOPERIDOL LACTATE 2 MG: 5 INJECTION, SOLUTION INTRAMUSCULAR at 23:37

## 2024-10-22 RX ADMIN — METHOCARBAMOL 500 MG: 500 TABLET ORAL at 12:03

## 2024-10-22 RX ADMIN — METHOCARBAMOL 500 MG: 500 TABLET ORAL at 07:50

## 2024-10-22 RX ADMIN — NYSTATIN 1 APPLICATION: 100000 CREAM TOPICAL at 17:02

## 2024-10-22 RX ADMIN — OXYCODONE HYDROCHLORIDE 10 MG: 10 TABLET ORAL at 14:15

## 2024-10-22 NOTE — ASSESSMENT & PLAN NOTE
MRI in 7/2024   Infectious/inflammatory changes from the occiput to cervical spine as   discussed above. Likely improved fluid collection in the left posterior   paraspinal soft tissues at C1-2 level. Otherwise no significant change from   06/29/2023 including epidural soft tissue thickening and enhancement and fluid   collection along the right aspect of C1-C2 joint and prevertebral space.   Patient went to hospice and completed 3 months of doxycycline  Still complains of pain - palliative care following for assistance with pain management  Pt was discharged for [facility] hospice services due to suspected diversion of narcotics by her sister Jaycee.  Pt was sent to the ED at [facility] for an opinion regarding plan of care  Palliative care and hospice services are following  palliative care service following - changed methadone to fentanyl patch to assist with discharge planning to a facility  Pt reports adequate pain relief at this time  Seen by neuropsychology - pt does not have capacity  Discharge planning to SNF for long term care  Will need guardianship

## 2024-10-22 NOTE — RESTORATIVE TECHNICIAN NOTE
Restorative Technician Note      Patient Name: Alyssa Madrigal     Restorative Tech Visit Date: 10/22/24  Note Type: Bracing, Initial consult  Patient Position Upon Consult: Supine  Activity Performed: Transferred; Dangled; Stood; Other (Comment) (sitting balance; sit to stands; Ax2 BHHA with b/l knee block transfer)  Brace Applied: [place] Collar Set (PRN for comfort; LV 1 chin plate)  Additional Brace Ordered: No  Patient Position When Brace Applied: Supine  Bracing Recommendations: None  Education Provided: Yes; Other (comment) (reviewed and provided handout)  Patient Position at End of Consult: Supine; Bed/Chair alarm activated; All needs within reach    Please contact [facility] PT Restorative tech on Epic Secure Chat  in regards to bracing instruction and/or adjustment.    Lindsay RICET, Restorative Technician

## 2024-10-22 NOTE — ASSESSMENT & PLAN NOTE
Patient has moments of lucidity where she can state her medical diagnoses, however, then states that she doesn't know what it means. She reports she has not told her mother about her diagnoses because she does not understand the prognosis.   Goals - maintain safe environment in hospital, DNR3, with proportional symptom management  Pt deemed not competent by NPsych professional.  Incompetent persons cannot consent to hospice.  Pt has no willing decision-makers  Pt will likely need a guardian. CM in process of applying for guardianship for patient.

## 2024-10-22 NOTE — RESTORATIVE TECHNICIAN NOTE
Restorative Technician Note      Patient Name: Alyssa Madrigal     Restorative Tech Visit Date: 10/22/24  Note Type: Mobility  Patient Position Upon Consult: Supine  Activity Performed: Transferred; Dangled; Stood; Other (Comment) (sitting balance; sit to stands; Ax2 BHHA with b/l knee block transfer)  Education Provided: Yes  Patient Position at End of Consult: Bedside chair; All needs within reach; Bed/Chair alarm activated    Lindsay Maddox  DPT, Restorative Technician

## 2024-10-22 NOTE — PROGRESS NOTES
Progress Note - Neurosurgery   Name: Alyssa Madrigal 64 y.o. female I MRN: [id number]  [facility] I Date of Admission: 10/8/2024   Date of Service: 10/22/2024 I Hospital Day: 14     Assessment & Plan  Osteomyelitis (HCC)  Patient with reported history of cervical epidural abscess s/p fusion  Remote h/o C4-5 ACDF in [place], C1-5 PCDF March 2023 at [facility] for cervical stenosis s/p fall out of bed with progressive myelopathy  Post op course complicated by MRSA infection, treated with IV antibiotics  Most recent admission in August 2024 showed epidural abscess with infection up to suboccipital area with evidence of hardware lucency; IR aspiration + MRSA. NSX recommended hardware removal and cervical traction (HALO) but patient refused and d/c with home hospice and oral antibiotics  Notes the day PTA show patient discharged from home hospice for medication diversion, IPU recommended but family refused and took patient off hospice care  H/o heroin / methamphetamine abuse, on methadone   Again off comfort care and level 3 DNR/DNI. Patient tearful.   Neurosurgery asked to re-evaluate patient for PT/OT clearance    Imaging:  CT cervical spine w/o, 10/11/24: Multilevel diffuse degenerative disc disease. Ankylosis with anterior cervical discectomy and fusion at C4-C5. Posterior postsurgical fusion from C1-C5. There is hardware loosening around multiple left-sided distal screws, namely around the distal left C1 screw (best seen on series 305 image 15) as well as the left-sided C3 and C4 screws (both best seen on series 603 image 67  MRI cervical spine w/wo, 8/28/24 (radiology read only): There has been development of cranial settling with the dens extending into the foramen magnum. In addition there appears to be widening of the C1 dens articulation concerning for injury to the cruciate ligament .It should be noted that the patient's right internal carotid artery loops anterior to the cervical spine. Therefore attention  to this should be paid if there is any concern for anterior spinal surgery. There Is a rim-enhancing fluid collection in the soft tissues inferior to the occiput. Unsure this represents a pseudomeningocele as it appears that this tract of fluid can possibly be followed anteriorly and to the left behind the surgical defect. Alternatively could represent an abscess. There is abnormal low T1-weighted signal and enhancement involving the left side of the skull base (occipital bone, left occipital condyle and left side of the C1 vertebral body) and posterior inferior spinal musculature concerning for infection of the left skull base and paraspinal musculature .There is some additional increased as T2-weighted signal in the spinal cord at the C2-3 level which may be artifactual however I cannot exclude some edema in the cord. There is some high T2-weighted signal ventral to the cervical spine consistent with edema. Abnormal enhancement in the spinal canal similar prior studies but more extensive again findings are concerning for infection/inflammation possibly some venous congestion.     Plan:  Continue to monitor neurological exam  Currently GCS 14.  Right upper extremity weakness 4-5 with R>L contractures. + hoffmans, clonus, hyperreflexia throughout.  Right lower extremity weakness secondary to extensive right hip surgery which may be at baseline.  Sensation intact.  Anterior and posterior cervical incisions well-healed without evidence of infection at this time.  Palliative care consulted, appreciate recommendations  Pain control per their recommendations  Continue GOC discussions  Neuropsych evaluated, patient deemed to not have capacity to make decisions. Patient's mother asked for re-evaluation which is pending given periods of lucency. Sister Kathleen unable/unwilling to be guardian. Parents unable to be guardians. Sister Majo unable to be guardian 2/2 medication diversion. Currently guardianship with the [facility]  pending.   Should patient wish to continue with full care, neurosurgery recommends the following:  MRI brain and cervical spine with and without contrast to evaluate extent of infection / osteomyelitis  Previous imaging should be pushed into our system from [facility]  Infectious work up with BC x2, ESR, CRP  ID consult to determine need for antibiotics. Per [facility] neurosurgery note from August 2024, patient should be on lifelong doxycycline but stopped at some point as she was lost to follow up  Patient currently not on antibiotics  VISTA collar ordered to help with pain control PRN. Recommend using if patient works with therapy for some aspect of protection. Ok to remove when in chair, bed, eating, and sleeping.   Medical management per primary team  PT/OT  DVT ppx: SCDs, lovenox    Neurosurgery will sign off at this time. Patient is not a good surgical candidate given multiple medical co-morbidities, lack of capacity and guardianship. Recommend collar with working with therapy; otherwise can use PRN for pain control. Please call with questions or concerns.         Subjective   Neurosurgery was asked to reevaluate patient today.  After discussion with primary team, we were asked to provide clearance for therapy to work with the patient.  I did have a lengthy discussion with the patient and continue to not recommend surgical intervention given her current mental status, comorbidities, and lack of capacity.  We discussed utilization of a cervical collar as needed for pain control which she is in agreement with.  We discussed that she can remove it at any time if it does not comfortable for her.  I do recommend that she use it when working with therapy however.  The patient seemed to lack insight at multiple points in our conversation and was very tangential and tearful.    Objective :  Temp:  [97.9 °F (36.6 °C)-99.6 °F (37.6 °C)] 97.9 °F (36.6 °C)  HR:  [] 89  BP: (109-118)/(75-79) 109/75  Resp:  [22] 22  SpO2:  [96  %-98 %] 98 %  O2 Device: None (Room air)    I/O         10/20 0701  10/21 0700 10/21 0701  10/22 0700 10/22 0701  10/23 0700    P.O. 238 220     Total Intake(mL/kg) 238 (4.5) 220 (4.1)     Urine (mL/kg/hr) 1300 (1) 675 (0.5)     Total Output 1300 675     Net -1062 -725                  Physical Exam Neurological Exam  General appearance: alert, appears stated age, cooperative and no distress. Tearful and anxious.  Head: Normocephalic, without obvious abnormality, atraumatic  Eyes: EOMI, PERRL  Neck: incisions well healed anterior and posterior  Lungs: non labored breathing  Heart: regular heart rate  Neurologic:   Mental status: Alert, oriented to self, thought content not always appropriate. Tangential, tearful at inappropriate things  Cranial nerves: grossly intact (Cranial nerves II-XII)  Sensory: normal to LT  Motor: RUE>LUE weakness with bilateral contractures. RLE>LLE weakness 2/2 hip injury  Reflexes: 4+ throughout, + hoffmans and clonus      Lab Results: I have reviewed the following results:  Recent Labs     10/20/24  1022   WBC 9.21   HGB 12.3   HCT 39.6      SODIUM 139   K 3.9      CO2 27   BUN 13   CREATININE 0.63   GLUC 121       Imaging Results Review: I personally reviewed the following image studies in PACS and associated radiology reports: CT C-spine. My interpretation of the radiology images/reports is: as above.      VTE Pharmacologic Prophylaxis: Sequential compression device (Venodyne)  and Enoxaparin (Lovenox)

## 2024-10-22 NOTE — ASSESSMENT & PLAN NOTE
Patient with reported history of cervical epidural abscess s/p fusion  Remote h/o C4-5 ACDF in [place], C1-5 PCDF March 2023 at [facility] for cervical stenosis s/p fall out of bed with progressive myelopathy  Post op course complicated by MRSA infection, treated with IV antibiotics  Most recent admission in August 2024 showed epidural abscess with infection up to suboccipital area with evidence of hardware lucency; IR aspiration + MRSA. NSX recommended hardware removal and cervical traction (HALO) but patient refused and d/c with home hospice and oral antibiotics  Notes the day PTA show patient discharged from home hospice for medication diversion, IPU recommended but family refused and took patient off hospice care  H/o heroin / methamphetamine abuse, on methadone   Again off comfort care and level 3 DNR/DNI. Patient tearful.   Neurosurgery asked to re-evaluate patient for PT/OT clearance    Imaging:  CT cervical spine w/o, 10/11/24: Multilevel diffuse degenerative disc disease. Ankylosis with anterior cervical discectomy and fusion at C4-C5. Posterior postsurgical fusion from C1-C5. There is hardware loosening around multiple left-sided distal screws, namely around the distal left C1 screw (best seen on series 305 image 15) as well as the left-sided C3 and C4 screws (both best seen on series 603 image 67  MRI cervical spine w/wo, 8/28/24 (radiology read only): There has been development of cranial settling with the dens extending into the foramen magnum. In addition there appears to be widening of the C1 dens articulation concerning for injury to the cruciate ligament .It should be noted that the patient's right internal carotid artery loops anterior to the cervical spine. Therefore attention to this should be paid if there is any concern for anterior spinal surgery. There Is a rim-enhancing fluid collection in the soft tissues inferior to the occiput. Unsure this represents a pseudomeningocele as it appears that this  tract of fluid can possibly be followed anteriorly and to the left behind the surgical defect. Alternatively could represent an abscess. There is abnormal low T1-weighted signal and enhancement involving the left side of the skull base (occipital bone, left occipital condyle and left side of the C1 vertebral body) and posterior inferior spinal musculature concerning for infection of the left skull base and paraspinal musculature .There is some additional increased as T2-weighted signal in the spinal cord at the C2-3 level which may be artifactual however I cannot exclude some edema in the cord. There is some high T2-weighted signal ventral to the cervical spine consistent with edema. Abnormal enhancement in the spinal canal similar prior studies but more extensive again findings are concerning for infection/inflammation possibly some venous congestion.     Plan:  Continue to monitor neurological exam  Currently GCS 14.  Right upper extremity weakness 4-5 with R>L contractures. + hoffmans, clonus, hyperreflexia throughout.  Right lower extremity weakness secondary to extensive right hip surgery which may be at baseline.  Sensation intact.  Anterior and posterior cervical incisions well-healed without evidence of infection at this time.  Palliative care consulted, appreciate recommendations  Pain control per their recommendations  Continue GOC discussions  Neuropsych evaluated, patient deemed to not have capacity to make decisions. Patient's mother asked for re-evaluation which is pending given periods of lucency. Sister Kathleen unable/unwilling to be guardian. Parents unable to be guardians. Sister Majo unable to be guardian 2/2 medication diversion. Currently guardianship with the state pending.   Should patient wish to continue with full care, neurosurgery recommends the following:  MRI brain and cervical spine with and without contrast to evaluate extent of infection / osteomyelitis  Previous imaging should be  pushed into our system from [facility]  Infectious work up with BC x2, ESR, CRP  ID consult to determine need for antibiotics. Per [facility] neurosurgery note from August 2024, patient should be on lifelong doxycycline but stopped at some point as she was lost to follow up  Patient currently not on antibiotics  VISTA collar ordered to help with pain control PRN. Recommend using if patient works with therapy for some aspect of protection. Ok to remove when in chair, bed, eating, and sleeping.   Medical management per primary team  PT/OT  DVT ppx: SCDs, lovenox    Neurosurgery will sign off at this time. Patient is not a good surgical candidate given multiple medical co-morbidities, lack of capacity and guardianship. Recommend collar with working with therapy; otherwise can use PRN for pain control. Please call with questions or concerns.

## 2024-10-22 NOTE — PLAN OF CARE
Problem: SAFETY ADULT  Goal: Patient will remain free of falls  Description: INTERVENTIONS:  - Educate patient/family on patient safety including physical limitations  - Instruct patient to call for assistance with activity   - Consult OT/PT to assist with strengthening/mobility   - Keep Call bell within reach  - Keep bed low and locked with side rails adjusted as appropriate  - Keep care items and personal belongings within reach  - Initiate and maintain comfort rounds  - Make Fall Risk Sign visible to staff  - Offer Toileting every 2 Hours, in advance of need  - Initiate/Maintain bed alarm  - Obtain necessary fall risk management equipment: assistive devices  - Apply yellow socks and bracelet for high fall risk patients  - Consider moving patient to room near nurses station  Outcome: Progressing     Problem: PAIN - ADULT  Goal: Verbalizes/displays adequate comfort level or baseline comfort level  Description: Interventions:  - Encourage patient to monitor pain and request assistance  - Assess pain using appropriate pain scale  - Administer analgesics based on type and severity of pain and evaluate response  - Implement non-pharmacological measures as appropriate and evaluate response  - Consider cultural and social influences on pain and pain management  - Notify physician/advanced practitioner if interventions unsuccessful or patient reports new pain  Outcome: Progressing     Problem: INFECTION - ADULT  Goal: Absence or prevention of progression during hospitalization  Description: INTERVENTIONS:  - Assess and monitor for signs and symptoms of infection  - Monitor lab/diagnostic results  - Monitor all insertion sites, i.e. indwelling lines, tubes, and drains  - Monitor endotracheal if appropriate and nasal secretions for changes in amount and color  - [place] appropriate cooling/warming therapies per order  - Administer medications as ordered  - Instruct and encourage patient and family to use good hand  hygiene technique  - Identify and instruct in appropriate isolation precautions for identified infection/condition  Outcome: Progressing     Problem: HEMATOLOGIC - ADULT  Goal: Maintains hematologic stability  Description: INTERVENTIONS  - Assess for signs and symptoms of bleeding or hemorrhage  - Monitor labs  - Administer supportive blood products/factors as ordered and appropriate  Outcome: Progressing     Problem: MUSCULOSKELETAL - ADULT  Goal: Maintain or return mobility to safest level of function  Description: INTERVENTIONS:  - Assess patient's ability to carry out ADLs; assess patient's baseline for ADL function and identify physical deficits which impact ability to perform ADLs (bathing, care of mouth/teeth, toileting, grooming, dressing, etc.)  - Assess/evaluate cause of self-care deficits   - Assess range of motion  - Assess patient's mobility  - Assess patient's need for assistive devices and provide as appropriate  - Encourage maximum independence but intervene and supervise when necessary  - Involve family in performance of ADLs  - Assess for home care needs following discharge   - Consider OT consult to assist with ADL evaluation and planning for discharge  - Provide patient education as appropriate  Outcome: Progressing     Problem: MUSCULOSKELETAL - ADULT  Goal: Maintain proper alignment of affected body part  Description: INTERVENTIONS:  - Support, maintain and protect limb and body alignment  - Provide patient/ family with appropriate education  Outcome: Progressing     Problem: GENITOURINARY - ADULT  Goal: Urinary catheter remains patent  Description: INTERVENTIONS:  - Assess patency of urinary catheter  - If patient has a chronic bhatti, consider changing catheter if non-functioning  - Follow guidelines for intermittent irrigation of non-functioning urinary catheter  Outcome: Progressing     Problem: Prexisting or High Potential for Compromised Skin Integrity  Goal: Skin integrity is maintained  or improved  Description: INTERVENTIONS:  - Identify patients at risk for skin breakdown  - Assess and monitor skin integrity  - Assess and monitor nutrition and hydration status  - Monitor labs   - Assess for incontinence   - Turn and reposition patient  - Assist with mobility/ambulation  - Relieve pressure over bony prominences  - Avoid friction and shearing  - Provide appropriate hygiene as needed including keeping skin clean and dry  - Evaluate need for skin moisturizer/barrier cream  - Collaborate with interdisciplinary team   - Patient/family teaching  - Consider wound care consult   Outcome: Progressing     Problem: Knowledge Deficit  Goal: Patient/family/caregiver demonstrates understanding of disease process, treatment plan, medications, and discharge instructions  Description: Complete learning assessment and assess knowledge base.  Interventions:  - Provide teaching at level of understanding  - Provide teaching via preferred learning methods  Outcome: Progressing     Problem: DISCHARGE PLANNING  Goal: Discharge to home or other facility with appropriate resources  Description: INTERVENTIONS:  - Identify barriers to discharge w/patient and caregiver  - Arrange for needed discharge resources and transportation as appropriate  - Identify discharge learning needs (meds, wound care, etc.)  - Arrange for interpretive services to assist at discharge as needed  - Refer to Case Management Department for coordinating discharge planning if the patient needs post-hospital services based on physician/advanced practitioner order or complex needs related to functional status, cognitive ability, or social support system  Outcome: Progressing

## 2024-10-22 NOTE — ASSESSMENT & PLAN NOTE
Continue bupropion 150 mg daily for anxious and impulsive behaviors.  Continue gabapentin 250 mg TID for anxiety  Continue Venlafaxine 225 mg daily, Quetiapine 150 mg HS   PRN regimen for anxiety and agitation

## 2024-10-22 NOTE — PLAN OF CARE
Problem: PAIN - ADULT  Goal: Verbalizes/displays adequate comfort level or baseline comfort level  Description: Interventions:  - Encourage patient to monitor pain and request assistance  - Assess pain using appropriate pain scale  - Administer analgesics based on type and severity of pain and evaluate response  - Implement non-pharmacological measures as appropriate and evaluate response  - Consider cultural and social influences on pain and pain management  - Notify physician/advanced practitioner if interventions unsuccessful or patient reports new pain  Outcome: Progressing     Problem: INFECTION - ADULT  Goal: Absence or prevention of progression during hospitalization  Description: INTERVENTIONS:  - Assess and monitor for signs and symptoms of infection  - Monitor lab/diagnostic results  - Monitor all insertion sites, i.e. indwelling lines, tubes, and drains  - Monitor endotracheal if appropriate and nasal secretions for changes in amount and color  - Lane appropriate cooling/warming therapies per order  - Administer medications as ordered  - Instruct and encourage patient and family to use good hand hygiene technique  - Identify and instruct in appropriate isolation precautions for identified infection/condition  Outcome: Progressing     Problem: SAFETY ADULT  Goal: Patient will remain free of falls  Description: INTERVENTIONS:  - Educate patient/family on patient safety including physical limitations  - Instruct patient to call for assistance with activity   - Consult OT/PT to assist with strengthening/mobility   - Keep Call bell within reach  - Keep bed low and locked with side rails adjusted as appropriate  - Keep care items and personal belongings within reach  - Initiate and maintain comfort rounds  - Make Fall Risk Sign visible to staff  - Offer Toileting every 2 Hours, in advance of need  - Initiate/Maintain bed alarm  - Obtain necessary fall risk management equipment: assistive devices  - Apply  yellow socks and bracelet for high fall risk patients  - Consider moving patient to room near nurses station  Outcome: Progressing

## 2024-10-22 NOTE — ASSESSMENT & PLAN NOTE
Had a fall out of bed on 10/11. CT scans and Xrs are without any new traumatic injury  Fall precautions  Continue analgesics

## 2024-10-22 NOTE — ASSESSMENT & PLAN NOTE
MRI in 7/2024 with infectious/inflammatory changes from the occiput to cervical spine, likely improved fluid collection in the left posterior paraspinal soft tissues at C1-2 level.   Patient went to hospice and completed 3 months of doxycycline   Discharged from [facility] hospice services due to suspected diversion of narcotics by her sister Jaycee   Neuropsychology evaluated patient - deemed to not have capacity   Discharge planning to SNF for long term care   Will need guardianship   Mother requesting re-evaluation by neurosurgery and neuropsych as patient has intermittent lucidity

## 2024-10-22 NOTE — PROGRESS NOTES
Progress Note - Palliative Care   Name: Alyssa Madrigal 64 y.o. female I MRN: [id number]  Unit/Bed#: [facility] 815-01 I Date of Admission: 10/8/2024   Date of Service: 10/22/2024 I Hospital Day: 14     Assessment & Plan  Osteomyelitis (HCC)  MRI in 7/2024 with infectious/inflammatory changes from the occiput to cervical spine, likely improved fluid collection in the left posterior paraspinal soft tissues at C1-2 level.   Patient went to hospice and completed 3 months of doxycycline   Discharged from [facility] hospice services due to suspected diversion of narcotics by her sister Jaycee   Neuropsychology evaluated patient - deemed to not have capacity   Discharge planning to SNF for long term care   Will need guardianship   Mother requesting re-evaluation by neurosurgery and neuropsych as patient has intermittent lucidity   Major depressive disorder, recurrent episode with anxious distress ([facility])  Continue bupropion 150 mg daily for anxious and impulsive behaviors.  Continue gabapentin 250 mg TID for anxiety  Continue Venlafaxine 225 mg daily, Quetiapine 150 mg HS   PRN regimen for anxiety and agitation   IVDU (intravenous drug user)  Pt will not be offered any controlled substance on discharge, unless she is discharged to a skilled environment.  Palliative care by specialist  Patient has moments of lucidity where she can state her medical diagnoses, however, then states that she doesn't know what it means. She reports she has not told her mother about her diagnoses because she does not understand the prognosis.   Goals - maintain safe environment in hospital, DNR3, with proportional symptom management  Pt deemed not competent by NPsych professional.  Incompetent persons cannot consent to hospice.  Pt has no willing decision-makers  Pt will likely need a guardian. CM in process of applying for guardianship for patient.   Post laminectomy syndrome  Pain control regimen   Continue fentanyl patch at 50 mcg Q72H  Continue gabapentin  250 mg TID   Scheduled Tylenol   PRN regimen for moderate, severe, and breakthrough pain   Posttraumatic stress disorder  Likely to benefit from safe environment and multimodal psychotropic management    Tardive dyskinesia  Complicates ability to swallow pills; will trial liquids where possible  Controlled substance agreement broken  Discharged from [facility] hospice services due to suspected diversion of narcotics by her sister Jaycee   Pt will not be offered any controlled substance on discharge, unless she is discharged to a skilled environment.  Fall      Interval history:  Patient was seen and examined at bedside today. At this time she reports throbbing pain rated 8/10 in the back of her head, top of neck, and back.   She states she is worried about losing her house if she has to go to a facility. She is able to state her current location, name, , current month/year. She states she knows she has pus in her spine, but does not know what this means, and as a result she has not told her mother about her diagnosis/prognosis. Patient states she is upset that she is recommended to go to a facility as she has been able to manage her home/finances for the past few years. She also reports that her sister is not allowed in her house anymore. Patient does not seem to understand extent of her prognosis, stating that she would rather stop all of her pain medications and be sent home on Suboxone only instead of being sent to a facility for rehab/strengthening and pain management.     Over past 24 hours patient received all scheduled pain medications as well as PRN Dilaudid for breakthrough, PRN oxycodone for severe pain, and PRN lorazepam for anxiety.    MEDICATIONS / ALLERGIES:     all current active meds have been reviewed and current meds:   Current Facility-Administered Medications:     acetaminophen (TYLENOL) oral suspension 320 mg, 4x Daily (with meals and at bedtime)    albuterol (PROVENTIL HFA,VENTOLIN HFA) inhaler 2  "puff, Q4H PRN    buPROPion (WELLBUTRIN XL) 24 hr tablet 150 mg, Daily    clonazePAM (KlonoPIN) tablet 1 mg, TID    enoxaparin (LOVENOX) subcutaneous injection 40 mg, Daily    fentaNYL (DURAGESIC) 50 mcg/hr TD 72 hr patch 50 mcg, Q72H    gabapentin (NEURONTIN) oral solution 250 mg, TID    haloperidol (HALDOL) tablet 1 mg, BID PRN    HYDROmorphone (DILAUDID) injection 0.5 mg, Q3H PRN    loratadine (CLARITIN) tablet 10 mg, Daily    LORazepam (ATIVAN) injection 1 mg, Q6H PRN    LORazepam (ATIVAN) injection 2 mg, Q10 Min PRN    methocarbamol (ROBAXIN) tablet 500 mg, 4x Daily (with meals and at bedtime)    naloxone (NARCAN) intranasal 2 mg, Daily PRN    nicotine (NICODERM CQ) 14 mg/24hr TD 24 hr patch 14 mg, Daily    nystatin (MYCOSTATIN) cream, BID    ondansetron (ZOFRAN) injection 4 mg, Q6H PRN    oxyCODONE (ROXICODONE) immediate release tablet 10 mg, Q3H PRN    oxyCODONE (ROXICODONE) IR tablet 5 mg, Q3H PRN    polyethylene glycol (MIRALAX) packet 17 g, Daily PRN    QUEtiapine (SEROquel XR) 24 hr tablet 150 mg, HS    senna (SENOKOT) tablet 17.2 mg, BID    venlafaxine (EFFEXOR-XR) 24 hr capsule 225 mg, Daily    Allergies   Allergen Reactions    Other      PT \"There is some other antibiotic that I took,its a really long name. I dont remember the name\"     Pollen Extract Other (See Comments)     Sinus headache.    Sulfa Antibiotics      Unknown reaction      Cefazolin Hives and Rash       OBJECTIVE:  Visit Vitals  /82   Pulse 102   Temp 98.8 °F (37.1 °C)   Resp 16   Ht 5' 4\" (1.626 m)   Wt 53.2 kg (117 lb 4.6 oz)   SpO2 95%   BMI 20.13 kg/m²   OB Status Hysterectomy   Smoking Status Former   BSA 1.56 m²         Physical Exam  Physical Exam  Constitutional:       General: She is not in acute distress.  HENT:      Mouth/Throat:      Mouth: Mucous membranes are moist.      Comments: Missing dentures   Eyes:      Conjunctiva/sclera: Conjunctivae normal.   Cardiovascular:      Rate and Rhythm: Normal rate and regular " rhythm.      Heart sounds: Normal heart sounds. No murmur heard.  Pulmonary:      Effort: Pulmonary effort is normal. No respiratory distress.   Abdominal:      General: Bowel sounds are normal.      Palpations: Abdomen is soft.      Tenderness: There is no abdominal tenderness.   Musculoskeletal:      Right lower leg: No edema.      Left lower leg: No edema.   Neurological:      Mental Status: She is alert.   Psychiatric:         Mood and Affect: Mood is anxious. Affect is tearful (at inappropriate things/times).         Speech: Speech is tangential.         Behavior: Behavior is cooperative.      Comments: Thought content not appropriate at times         Lab Results:   Results from last 7 days   Lab Units 10/20/24  1022   WBC Thousand/uL 9.21   HEMOGLOBIN g/dL 12.3   HEMATOCRIT % 39.6   PLATELETS Thousands/uL 352   SEGS PCT % 68   MONO PCT % 9   EOS PCT % 3     Results from last 7 days   Lab Units 10/20/24  1022   POTASSIUM mmol/L 3.9   CHLORIDE mmol/L 104   CO2 mmol/L 27   BUN mg/dL 13   CREATININE mg/dL 0.63   CALCIUM mg/dL 9.1       Imaging Studies: reviewed pertinent studies   EKG, Pathology, and Other Studies: reviewed pertinent studies    Counseling / Coordination of Care    Total floor / unit time spent today 30 minutes. Greater than 50% of total time was spent with the patient and / or family counseling and / or coordination of care. A description of the counseling / coordination of care: symptom assessment and management, psychosocial support, goals of care, supportive listening, and anticipatory guidance

## 2024-10-22 NOTE — PROGRESS NOTES
Progress Note - Hospitalist   Name: Alyssa Madrigal 64 y.o. female I MRN: [id number]  Unit/Bed#: [facility] 815-01 I Date of Admission: 10/8/2024   Date of Service: 10/22/2024 I Hospital Day: 14    Assessment & Plan  Osteomyelitis (HCC)  MRI in 7/2024   Infectious/inflammatory changes from the occiput to cervical spine as   discussed above. Likely improved fluid collection in the left posterior   paraspinal soft tissues at C1-2 level. Otherwise no significant change from   06/29/2023 including epidural soft tissue thickening and enhancement and fluid   collection along the right aspect of C1-C2 joint and prevertebral space.   Patient went to hospice and completed 3 months of doxycycline  Still complains of pain - palliative care following for assistance with pain management  Pt was discharged for [facility] hospice services due to suspected diversion of narcotics by her sister Jaycee.  Pt was sent to the ED at [facility] for an opinion regarding plan of care  Palliative care and hospice services are following  palliative care service following - changed methadone to fentanyl patch to assist with discharge planning to a facility  Pt reports adequate pain relief at this time  Seen by neuropsychology - pt does not have capacity  Discharge planning to SNF for long term care  Will need guardianship    Controlled substance agreement broken  Plan noted above  COPD (chronic obstructive pulmonary disease) with chronic bronchitis (HCC)  Continue albuterol  Major depressive disorder, recurrent episode with anxious distress (HCC)  Continue venlaflaxine  IVDU (intravenous drug user)  Noted in history  Fall  Had a fall out of bed on 10/11. CT scans and Xrs are without any new traumatic injury  Fall precautions  Continue analgesics    VTE Pharmacologic Prophylaxis: VTE Score: 3 Moderate Risk (Score 3-4) - Pharmacological DVT Prophylaxis Ordered: enoxaparin (Lovenox).    Mobility:   Basic Mobility Inpatient Raw Score: 11  [facility] Goal: 4: Move to  chair/commode  JH-HLM Achieved: 1: Laying in bed  JH-HLM Goal NOT achieved. Continue with multidisciplinary rounding and encourage appropriate mobility to improve upon JH-HLM goals.    Patient Centered Rounds: I performed bedside rounds with nursing staff today.   Discussions with Specialists or Other Care Team Provider: Neurosurgery    Education and Discussions with Family / Patient: Patient declined call to .     Current Length of Stay: 14 day(s)  Current Patient Status: Inpatient   Certification Statement: The patient will continue to require additional inpatient hospital stay due to safe discharge planning  Discharge Plan:  Acute rehab/long-term facility/hospice    Code Status: Level 3 - DNAR and DNI    Subjective   This is a very pleasant 64-year-old female who was seen evaluated today at bedside.  Patient denies any acute complaints at this time.  Patient would like to be reassessed for competency.    Objective :  Temp:  [97.9 °F (36.6 °C)-99.6 °F (37.6 °C)] 97.9 °F (36.6 °C)  HR:  [] 89  BP: (109-118)/(75-79) 109/75  Resp:  [22] 22  SpO2:  [96 %-98 %] 98 %  O2 Device: None (Room air)    Body mass index is 20.13 kg/m².     Input and Output Summary (last 24 hours):     Intake/Output Summary (Last 24 hours) at 10/22/2024 1404  Last data filed at 10/22/2024 1312  Gross per 24 hour   Intake 220 ml   Output 725 ml   Net -505 ml       Physical Exam  Vitals reviewed.   Constitutional:       General: She is not in acute distress.     Appearance: She is not ill-appearing.   HENT:      Head: Normocephalic.   Eyes:      Conjunctiva/sclera: Conjunctivae normal.   Cardiovascular:      Rate and Rhythm: Normal rate and regular rhythm.   Pulmonary:      Effort: Pulmonary effort is normal.   Abdominal:      General: Abdomen is flat.      Palpations: Abdomen is soft.      Tenderness: There is no abdominal tenderness.   Skin:     General: Skin is warm and dry.   Neurological:      Mental Status: She is alert.  She is disoriented.           Lines/Drains:  Lines/Drains/Airways       Active Status       Name Placement date Placement time Site Days    Urethral Catheter 10/08/24  --  --  14                  Urinary Catheter:  Goal for removal: N/A - Chronic Lam                 Lab Results: I have reviewed the following results:   Results from last 7 days   Lab Units 10/20/24  1022   WBC Thousand/uL 9.21   HEMOGLOBIN g/dL 12.3   HEMATOCRIT % 39.6   PLATELETS Thousands/uL 352   SEGS PCT % 68   LYMPHO PCT % 19   MONO PCT % 9   EOS PCT % 3     Results from last 7 days   Lab Units 10/20/24  1022   SODIUM mmol/L 139   POTASSIUM mmol/L 3.9   CHLORIDE mmol/L 104   CO2 mmol/L 27   BUN mg/dL 13   CREATININE mg/dL 0.63   ANION GAP mmol/L 8   CALCIUM mg/dL 9.1   GLUCOSE RANDOM mg/dL 121         Results from last 7 days   Lab Units 10/19/24  2123   POC GLUCOSE mg/dl 136               Recent Cultures (last 7 days):         Imaging Results Review: No pertinent imaging studies reviewed.  Other Study Results Review: No additional pertinent studies reviewed.    Last 24 Hours Medication List:     Current Facility-Administered Medications:     acetaminophen (TYLENOL) oral suspension 320 mg, 4x Daily (with meals and at bedtime)    albuterol (PROVENTIL HFA,VENTOLIN HFA) inhaler 2 puff, Q4H PRN    buPROPion (WELLBUTRIN XL) 24 hr tablet 150 mg, Daily    clonazePAM (KlonoPIN) tablet 1 mg, TID    enoxaparin (LOVENOX) subcutaneous injection 40 mg, Daily    fentaNYL (DURAGESIC) 50 mcg/hr TD 72 hr patch 50 mcg, Q72H    gabapentin (NEURONTIN) oral solution 250 mg, TID    haloperidol (HALDOL) tablet 1 mg, BID PRN    HYDROmorphone (DILAUDID) injection 0.5 mg, Q3H PRN    loratadine (CLARITIN) tablet 10 mg, Daily    LORazepam (ATIVAN) injection 1 mg, Q6H PRN    LORazepam (ATIVAN) injection 2 mg, Q10 Min PRN    methocarbamol (ROBAXIN) tablet 500 mg, 4x Daily (with meals and at bedtime)    naloxone (NARCAN) intranasal 2 mg, Daily PRN    nicotine (NICODERM CQ)  14 mg/24hr TD 24 hr patch 14 mg, Daily    nystatin (MYCOSTATIN) cream, BID    ondansetron (ZOFRAN) injection 4 mg, Q6H PRN    oxyCODONE (ROXICODONE) immediate release tablet 10 mg, Q3H PRN    oxyCODONE (ROXICODONE) IR tablet 5 mg, Q3H PRN    polyethylene glycol (MIRALAX) packet 17 g, Daily PRN    QUEtiapine (SEROquel XR) 24 hr tablet 150 mg, HS    senna (SENOKOT) tablet 17.2 mg, BID    venlafaxine (EFFEXOR-XR) 24 hr capsule 225 mg, Daily    Administrative Statements   Today, Patient Was Seen By: Mao Martinez MD  I have spent a total time of 35 minutes in caring for this patient on the day of the visit/encounter including Risks and benefits of tx options, Counseling / Coordination of care, Reviewing / ordering tests, medicine, procedures  , Obtaining or reviewing history  , and Communicating with other healthcare professionals .    **Please Note: This note may have been constructed using a voice recognition system.**

## 2024-10-22 NOTE — ASSESSMENT & PLAN NOTE
Pt will not be offered any controlled substance on discharge, unless she is discharged to a skilled environment.

## 2024-10-22 NOTE — CASE MANAGEMENT
Case Management Progress Note    Patient name Alyssa Madrigal  Location [facility] 815/[facility] 815-01 MRN [id number]  : 1960 Date 10/22/2024       LOS (days): 14  Geometric Mean LOS (GMLOS) (days): 4.2  Days to GMLOS:-9.6        OBJECTIVE:        Current admission status: Inpatient  Preferred Pharmacy:   [facility] [id number] [place] [address], [facility] [address] [place] [address]  [address] [address]  Phone: [phone] Fax: [phone]    [facility] [place] [place] - [address]  [address] [facility] [address]  Phone: [phone] Fax: [phone]    Primary Care Provider: Edenilson Thomas DO    Primary Insurance: [facility] REP  Secondary Insurance: [facility]    PROGRESS NOTE:    Guardianship pending.  CM received call from  Jean at [facility] requesting more information on pt's hospital course.  Christopher's callback number is [phone].

## 2024-10-22 NOTE — NURSING NOTE
Throughout shift pts moods extremely fluctuating one minute pleasant and cooperative, the next restless, attempting to get oob, crying and argumentative with staff. Attempts made to calm pt- unsuccessful.  Pt resting in bed, safe environment maintained.

## 2024-10-22 NOTE — ASSESSMENT & PLAN NOTE
Discharged from [facility] hospice services due to suspected diversion of narcotics by her sister Jaycee Nesbitt will not be offered any controlled substance on discharge, unless she is discharged to a skilled environment.

## 2024-10-22 NOTE — UTILIZATION REVIEW
Continued Stay Review    Date: 10/22/24                          Current Patient Class: Inpatient  Current Level of Care: med surg    HPI:64 y.o. female initially admitted on 10/8/24 inpatient      Assessment/Plan:    10/22/2024 .  Patient presents with  being tearful.  At times seems to lack insight, tangential.   On exam:   GCS 14.  Right upper extremity weakness 4-5 with R>L contractures. + hoffmans, clonus, hyperreflexia throughout.  Right lower extremity weakness secondary to extensive right hip surgery which may be at baseline.  Sensation intact.  Anterior and posterior cervical incisions well-healed without evidence of infection at this time.   Abnormal labs or imaging:    Diagnosis/Plan    Osteomyelitis.  Monitor neuro exam.  [place] collar for pain control.    Has been seen by neuro psyche and is not competent.   Guardianship with state pending - mother wants re evaluation due to periods of lucency.  One sister not willing to be guardian.  Parents unable to be guardians,  other sister unable  due to medication diversion.    Today re evaluated by neurosurgery - no surgical intervention.   Cervical collar as needed for pain control.   Palliative care following     Medications:   Scheduled Medications:  acetaminophen, 320 mg, Oral, 4x Daily (with meals and at bedtime)  buPROPion, 150 mg, Oral, Daily  clonazePAM, 1 mg, Oral, TID  enoxaparin, 40 mg, Subcutaneous, Daily  fentaNYL, 50 mcg, Transdermal, Q72H  gabapentin, 250 mg, Oral, TID  loratadine, 10 mg, Oral, Daily  methocarbamol, 500 mg, Oral, 4x Daily (with meals and at bedtime)  nicotine, 14 mg, Transdermal, Daily  nystatin, , Topical, BID  QUEtiapine, 150 mg, Oral, HS  senna, 2 tablet, Oral, BID  venlafaxine, 225 mg, Oral, Daily    Continuous IV Infusions: none      PRN Meds: not used thus far on 10/22/24 at 1300  albuterol, 2 puff, Inhalation, Q4H PRN  haloperidol, 1 mg, Oral, BID PRN  HYDROmorphone, 0.5 mg, Intravenous, Q3H PRN  LORazepam, 1 mg,  Intravenous, Q6H PRN  LORazepam, 2 mg, Intravenous, Q10 Min PRN  naloxone, 2 mg, Nasal, Daily PRN  ondansetron, 4 mg, Intravenous, Q6H PRN  oxyCODONE, 10 mg, Oral, Q3H PRN  oxyCODONE, 5 mg, Oral, Q3H PRN  polyethylene glycol, 17 g, Oral, Daily PRN    Skin Care orders:   1-Hydraguard to sacrum, buttocks and heels BID and PRN   2-EHOB  cushion when out of bed in chair.   3-Moisturize skin daily with skin nourishing cream   4-Elevate heels to offload pressure.   5-Turn/reposition q2h for pressure re-distribution on skin     Discharge Plan:  to be determined.     Vital Signs (last 3 days)       Date/Time Temp Pulse Resp BP MAP (mmHg) SpO2 O2 Device Pain    10/22/24 1203 -- -- -- -- -- -- -- 10 - Worst Possible Pain    10/22/24 1100 -- -- -- -- -- -- None (Room air) 6    10/22/24 10:42:44 97.9 °F (36.6 °C) 89 -- 109/75 86 98 % -- --    10/22/24 0752 -- -- -- -- -- -- -- 9    10/21/24 2350 -- -- -- -- -- -- -- 10 - Worst Possible Pain    10/21/24 2145 -- -- -- -- -- -- -- 10 - Worst Possible Pain    10/21/24 2140 -- -- -- -- -- -- -- 10 - Worst Possible Pain    10/21/24 1951 -- -- -- -- -- -- -- 10 - Worst Possible Pain    10/21/24 1827 -- -- -- -- -- -- -- 7    10/21/24 1527 -- -- -- -- -- -- -- 7    10/21/24 1522 -- -- -- -- -- -- -- 7    10/21/24 15:11:38 99.6 °F (37.6 °C) 101 22 118/79 92 96 % -- --    10/21/24 1319 -- -- -- -- -- -- -- 10 - Worst Possible Pain    10/21/24 1103 -- -- -- -- -- -- -- 7    10/21/24 0905 -- -- -- -- -- -- -- 7    10/21/24 0809 -- -- -- -- -- -- -- 7    10/21/24 08:08:06 97.5 °F (36.4 °C) -- 16 118/78 91 93 % -- --    10/20/24 2327 -- -- -- -- -- -- -- 8    10/20/24 2236 -- -- -- -- -- -- -- 8    10/20/24 22:29:08 99.3 °F (37.4 °C) 105 18 138/95 109 93 % -- --    10/20/24 2124 -- -- -- -- -- -- -- 8    10/20/24 2015 -- -- -- -- -- -- -- 8    10/20/24 1813 -- -- -- -- -- -- -- 8    10/20/24 1722 -- -- -- -- -- -- -- 8    10/20/24 15:18:47 -- 118 18 116/82 93 96 % -- --    10/20/24 1515 --  -- -- -- -- -- None (Room air) --    10/20/24 1105 -- -- -- -- -- -- -- 10 - Worst Possible Pain    10/20/24 0827 -- -- -- -- -- -- -- 10 - Worst Possible Pain    10/20/24 0826 -- -- -- -- -- -- -- 10 - Worst Possible Pain    10/20/24 07:56:57 98.7 °F (37.1 °C) 100 20 117/83 94 95 % -- --    10/20/24 0710 -- -- -- -- -- -- -- 10 - Worst Possible Pain    10/19/24 2343 -- -- -- -- -- -- -- 8    10/19/24 2252 -- -- -- -- -- -- -- 8    10/19/24 22:33:33 100.3 °F (37.9 °C) 101 14 116/85 95 94 % -- --    10/19/24 2109 -- -- -- -- -- -- -- 8    10/19/24 2004 -- -- -- -- -- -- -- 8    10/19/24 1842 -- -- -- -- -- -- -- 8    10/19/24 1753 -- -- -- -- -- -- -- 8    10/19/24 15:21:03 98.9 °F (37.2 °C) 93 18 118/83 95 97 % -- --    10/19/24 1515 -- -- -- -- -- -- None (Room air) --    10/19/24 1510 -- -- -- -- -- -- -- 10 - Worst Possible Pain    10/19/24 1432 -- -- -- -- -- -- -- 7    10/19/24 1205 -- -- -- -- -- -- -- 10 - Worst Possible Pain    10/19/24 1133 -- -- -- -- -- -- -- 7    10/19/24 0835 -- -- -- -- -- -- -- 7    10/19/24 07:59:28 97.3 °F (36.3 °C) 98 16 120/83 95 97 % -- --    10/19/24 0113 -- -- -- -- -- -- -- 8          Weight (last 2 days)       Date/Time Weight    10/21/24 0535 53.2 (117.29)            Pertinent Labs/Diagnostic Results:   Radiology:  XR shoulder 2+ vw right   Final Interpretation by Barbra Adams MD (10/12 1417)      No acute fracture or shoulder dislocation.      Mild widening of left AC joint, however without cephalad displacement of the distal clavicle in relation to the acromion.      Degenerative changes of bilateral glenohumeral and acromioclavicular joints            Workstation performed: [id number]         XR shoulder 2+ vw left   Final Interpretation by Barbra Adams MD (10/12 1417)      No acute fracture or shoulder dislocation.      Mild widening of left AC joint, however without cephalad displacement of the distal clavicle in relation to the acromion.      Degenerative  "changes of bilateral glenohumeral and acromioclavicular joints            Workstation performed: [id number]         XR hips bilateral 2 vw w pelvis if performed   Final Interpretation by Barbra Adams MD (10/12 1411)      No acute fracture.      Status post prior resection of the right femoral head neck with some remodeling of the residual right femur compared to 2022. Evaluation for osteomyelitis is limited.      Unremarkable appearance of the left total hip arthroplasty.               Workstation performed: [id number]         CT head wo contrast   Final Interpretation by Dung Jordan DO (10/11 2038)      No acute intracranial abnormality.  Chronic microangiopathic changes.                  Workstation performed: [id number]         CT spine cervical wo contrast   Final Interpretation by Dung Jordan DO (10/11 2045)   No evidence of acute fracture, traumatic subluxation, or atlantooccipital dislocation.   Postoperative changes as detailed above with evidence of multilevel loosening around left-sided screws as detailed above.   This study was marked for \"Immediate\" notification in EPIC.                  Workstation performed: [id number]           Results from last 7 days   Lab Units 10/20/24  1022   WBC Thousand/uL 9.21   HEMOGLOBIN g/dL 12.3   HEMATOCRIT % 39.6   PLATELETS Thousands/uL 352   TOTAL NEUT ABS Thousands/µL 6.33     Results from last 7 days   Lab Units 10/20/24  1022   SODIUM mmol/L 139   POTASSIUM mmol/L 3.9   CHLORIDE mmol/L 104   CO2 mmol/L 27   ANION GAP mmol/L 8   BUN mg/dL 13   CREATININE mg/dL 0.63   EGFR ml/min/1.73sq m 95   CALCIUM mg/dL 9.1     Results from last 7 days   Lab Units 10/19/24  2123   POC GLUCOSE mg/dl 136     Results from last 7 days   Lab Units 10/20/24  1022   GLUCOSE RANDOM mg/dL 121       Network Utilization Review Department  ATTENTION: Please call with any questions or concerns to [phone] and carefully listen to the prompts so that you are directed to the right " person. All voicemails are confidential.   For Discharge needs, contact Care Management DC Support Team at [phone] opt. 2  Send all requests for admission clinical reviews, approved or denied determinations and any other requests to dedicated fax number below belonging to the campus where the patient is receiving treatment. List of dedicated fax numbers for the Facilities:  FACILITY NAME UR FAX NUMBER   ADMISSION DENIALS (Administrative/Medical Necessity) [phone]   DISCHARGE SUPPORT TEAM (NETWORK) [phone]   PARENT CHILD HEALTH (Maternity/NICU/Pediatrics) [phone]   [facility] [phone]   [facility] [phone]   formerly [facility] [phone]   [facility] [phone]   [facility] [phone]   [facility] [phone]   [facility] [phone]   [facility] [phone]   [facility] [phone]   [facility] [phone]   [facility] [phone]   [facility] [phone]

## 2024-10-23 LAB
ALBUMIN SERPL BCG-MCNC: 3.4 G/DL (ref 3.5–5)
ALP SERPL-CCNC: 113 U/L (ref 34–104)
ALT SERPL W P-5'-P-CCNC: 21 U/L (ref 7–52)
ANION GAP SERPL CALCULATED.3IONS-SCNC: 6 MMOL/L (ref 4–13)
AST SERPL W P-5'-P-CCNC: 22 U/L (ref 13–39)
BASOPHILS # BLD AUTO: 0.08 THOUSANDS/ΜL (ref 0–0.1)
BASOPHILS NFR BLD AUTO: 1 % (ref 0–1)
BILIRUB SERPL-MCNC: 0.21 MG/DL (ref 0.2–1)
BUN SERPL-MCNC: 14 MG/DL (ref 5–25)
CALCIUM ALBUM COR SERPL-MCNC: 9.5 MG/DL (ref 8.3–10.1)
CALCIUM SERPL-MCNC: 9 MG/DL (ref 8.4–10.2)
CHLORIDE SERPL-SCNC: 105 MMOL/L (ref 96–108)
CO2 SERPL-SCNC: 29 MMOL/L (ref 21–32)
CREAT SERPL-MCNC: 0.58 MG/DL (ref 0.6–1.3)
EOSINOPHIL # BLD AUTO: 0.28 THOUSAND/ΜL (ref 0–0.61)
EOSINOPHIL NFR BLD AUTO: 3 % (ref 0–6)
ERYTHROCYTE [DISTWIDTH] IN BLOOD BY AUTOMATED COUNT: 15.4 % (ref 11.6–15.1)
GFR SERPL CREATININE-BSD FRML MDRD: 98 ML/MIN/1.73SQ M
GLUCOSE SERPL-MCNC: 111 MG/DL (ref 65–140)
HCT VFR BLD AUTO: 40.7 % (ref 34.8–46.1)
HGB BLD-MCNC: 12.8 G/DL (ref 11.5–15.4)
IMM GRANULOCYTES # BLD AUTO: 0.02 THOUSAND/UL (ref 0–0.2)
IMM GRANULOCYTES NFR BLD AUTO: 0 % (ref 0–2)
LYMPHOCYTES # BLD AUTO: 1.97 THOUSANDS/ΜL (ref 0.6–4.47)
LYMPHOCYTES NFR BLD AUTO: 22 % (ref 14–44)
MCH RBC QN AUTO: 29 PG (ref 26.8–34.3)
MCHC RBC AUTO-ENTMCNC: 31.4 G/DL (ref 31.4–37.4)
MCV RBC AUTO: 92 FL (ref 82–98)
MONOCYTES # BLD AUTO: 0.79 THOUSAND/ΜL (ref 0.17–1.22)
MONOCYTES NFR BLD AUTO: 9 % (ref 4–12)
NEUTROPHILS # BLD AUTO: 5.93 THOUSANDS/ΜL (ref 1.85–7.62)
NEUTS SEG NFR BLD AUTO: 65 % (ref 43–75)
NRBC BLD AUTO-RTO: 0 /100 WBCS
PLATELET # BLD AUTO: 392 THOUSANDS/UL (ref 149–390)
PMV BLD AUTO: 9.6 FL (ref 8.9–12.7)
POTASSIUM SERPL-SCNC: 3.7 MMOL/L (ref 3.5–5.3)
PROT SERPL-MCNC: 6.5 G/DL (ref 6.4–8.4)
RBC # BLD AUTO: 4.41 MILLION/UL (ref 3.81–5.12)
SODIUM SERPL-SCNC: 140 MMOL/L (ref 135–147)
WBC # BLD AUTO: 9.07 THOUSAND/UL (ref 4.31–10.16)

## 2024-10-23 PROCEDURE — 85025 COMPLETE CBC W/AUTO DIFF WBC: CPT | Performed by: FAMILY MEDICINE

## 2024-10-23 PROCEDURE — 99232 SBSQ HOSP IP/OBS MODERATE 35: CPT | Performed by: FAMILY MEDICINE

## 2024-10-23 PROCEDURE — 80053 COMPREHEN METABOLIC PANEL: CPT | Performed by: FAMILY MEDICINE

## 2024-10-23 PROCEDURE — 99233 SBSQ HOSP IP/OBS HIGH 50: CPT | Performed by: INTERNAL MEDICINE

## 2024-10-23 RX ADMIN — GABAPENTIN 250 MG: 250 SOLUTION ORAL at 16:15

## 2024-10-23 RX ADMIN — METHOCARBAMOL 500 MG: 500 TABLET ORAL at 12:52

## 2024-10-23 RX ADMIN — GABAPENTIN 250 MG: 250 SOLUTION ORAL at 22:33

## 2024-10-23 RX ADMIN — METHOCARBAMOL 500 MG: 500 TABLET ORAL at 16:15

## 2024-10-23 RX ADMIN — SENNOSIDES 17.2 MG: 8.6 TABLET, FILM COATED ORAL at 08:38

## 2024-10-23 RX ADMIN — METHOCARBAMOL 500 MG: 500 TABLET ORAL at 22:33

## 2024-10-23 RX ADMIN — VENLAFAXINE HYDROCHLORIDE 225 MG: 150 CAPSULE, EXTENDED RELEASE ORAL at 08:48

## 2024-10-23 RX ADMIN — ACETAMINOPHEN 320 MG: 650 SUSPENSION ORAL at 22:33

## 2024-10-23 RX ADMIN — LORATADINE 10 MG: 10 TABLET ORAL at 08:38

## 2024-10-23 RX ADMIN — FENTANYL 50 MCG: 50 PATCH, EXTENDED RELEASE TRANSDERMAL at 08:52

## 2024-10-23 RX ADMIN — POLYETHYLENE GLYCOL 3350 17 G: 17 POWDER, FOR SOLUTION ORAL at 10:58

## 2024-10-23 RX ADMIN — NYSTATIN 1 APPLICATION: 100000 CREAM TOPICAL at 08:50

## 2024-10-23 RX ADMIN — GABAPENTIN 250 MG: 250 SOLUTION ORAL at 08:58

## 2024-10-23 RX ADMIN — CLONAZEPAM 1 MG: 1 TABLET ORAL at 08:38

## 2024-10-23 RX ADMIN — CLONAZEPAM 1 MG: 1 TABLET ORAL at 16:15

## 2024-10-23 RX ADMIN — ACETAMINOPHEN 320 MG: 650 SUSPENSION ORAL at 16:14

## 2024-10-23 RX ADMIN — BUPROPION HYDROCHLORIDE 150 MG: 150 TABLET, EXTENDED RELEASE ORAL at 08:38

## 2024-10-23 RX ADMIN — QUETIAPINE 150 MG: 150 TABLET, FILM COATED, EXTENDED RELEASE ORAL at 22:41

## 2024-10-23 RX ADMIN — SENNOSIDES 17.2 MG: 8.6 TABLET, FILM COATED ORAL at 17:56

## 2024-10-23 RX ADMIN — NICOTINE 14 MG: 14 PATCH, EXTENDED RELEASE TRANSDERMAL at 08:38

## 2024-10-23 RX ADMIN — ACETAMINOPHEN 320 MG: 650 SUSPENSION ORAL at 07:48

## 2024-10-23 RX ADMIN — ACETAMINOPHEN 320 MG: 650 SUSPENSION ORAL at 12:52

## 2024-10-23 RX ADMIN — METHOCARBAMOL 500 MG: 500 TABLET ORAL at 07:48

## 2024-10-23 RX ADMIN — OXYCODONE HYDROCHLORIDE 10 MG: 10 TABLET ORAL at 17:56

## 2024-10-23 RX ADMIN — POLYETHYLENE GLYCOL 3350 17 G: 17 POWDER, FOR SOLUTION ORAL at 10:57

## 2024-10-23 RX ADMIN — CLONAZEPAM 1 MG: 1 TABLET ORAL at 22:33

## 2024-10-23 RX ADMIN — ENOXAPARIN SODIUM 40 MG: 40 INJECTION SUBCUTANEOUS at 08:38

## 2024-10-23 RX ADMIN — OXYCODONE HYDROCHLORIDE 10 MG: 10 TABLET ORAL at 10:30

## 2024-10-23 NOTE — ASSESSMENT & PLAN NOTE
Continue Bupropion 150 mg Daily (Anxious and Impulsive Behavior).  Continue Gabapentin 250 mg TID (Anxiety).  Continue Venlafaxine 225 mg Daily and Quetiapine 150 mg Bedtime.   Continue PRN regimen for anxiety, and agitation.

## 2024-10-23 NOTE — ASSESSMENT & PLAN NOTE
MRI Cervical Spine in July 2024: Infectious/inflammatory changes from the occiput to cervical spine, likely improved fluid collection in the left posterior paraspinal soft tissues at C1-2 level. Patient transitioned to hospice through [facility], however, was subsequently discharged due to suspected diversion of narcotics by her patient's sister (e.g. Jaycee).    Neuropsychology evaluated patient, and determined that she does not have capacity.  Note: Case Management is presently assisting with arranging for guardianship.  Discharge planning to [facility]illed[facility]ursing Facility for long-term care.

## 2024-10-23 NOTE — PROGRESS NOTES
Progress Note - Hospitalist   Name: Alyssa Madrigal 64 y.o. female I MRN: [id number]  Unit/Bed#: [facility] 815-01 I Date of Admission: 10/8/2024   Date of Service: 10/23/2024 I Hospital Day: 15    Assessment & Plan  Osteomyelitis (HCC)  MRI in 7/2024  Infectious/inflammatory changes from the occiput to cervical spine as   discussed above. Likely improved fluid collection in the left posterior   paraspinal soft tissues at C1-2 level. Otherwise no significant change from   06/29/2023 including epidural soft tissue thickening and enhancement and fluid   collection along the right aspect of C1-C2 joint and prevertebral space.   Patient went to hospice and completed 3 months of doxycycline  Still complains of pain - palliative care following for assistance with pain management  Pt was discharged for [facility] hospice services due to suspected diversion of narcotics by her sister Jaycee.  Pt was sent to the ED at [facility] for an opinion regarding plan of care  Palliative care and hospice services are following  palliative care service following - changed methadone to fentanyl patch to assist with discharge planning to a facility  Pt reports adequate pain relief at this time  Seen by neuropsychology - pt does not have capacity  Discharge planning to SNF for long term care  Will need guardianship    Controlled substance agreement broken  Plan noted above  COPD (chronic obstructive pulmonary disease) with chronic bronchitis (HCC)  Continue albuterol  Major depressive disorder, recurrent episode with anxious distress (HCC)  Continue venlaflaxine  IVDU (intravenous drug user)  Noted in history  Fall  Had a fall out of bed on 10/11. CT scans and Xrs are without any new traumatic injury  Fall precautions  Continue analgesics    VTE Pharmacologic Prophylaxis: VTE Score: 3 Moderate Risk (Score 3-4) - Pharmacological DVT Prophylaxis Ordered: enoxaparin (Lovenox).    Mobility:   Basic Mobility Inpatient Raw Score: 11  [facility] Goal: 4: Move to  chair/commode  JH-HLM Achieved: 1: Laying in bed  JH-HLM Goal NOT achieved. Continue with multidisciplinary rounding and encourage appropriate mobility to improve upon JH-HLM goals.    Patient Centered Rounds: I performed bedside rounds with nursing staff today.   Discussions with Specialists or Other Care Team Provider: Palliative care    Education and Discussions with Family / Patient: Patient declined call to .     Current Length of Stay: 15 day(s)  Current Patient Status: Inpatient   Certification Statement: The patient will continue to require additional inpatient hospital stay due to Safe D/C planning  Discharge Plan:  pending guardianship    Code Status: Level 3 - DNAR and DNI    Subjective   This is a very pleasant 64-year-old female who was seen and evaluated today at bedside.  Patient denies any acute complaints at this time.    Objective :  Temp:  [97.3 °F (36.3 °C)-98.8 °F (37.1 °C)] 97.3 °F (36.3 °C)  HR:  [] 86  BP: (107-127)/(70-90) 107/70  Resp:  [16-20] 16  SpO2:  [95 %-99 %] 99 %  O2 Device: None (Room air)    Body mass index is 20.13 kg/m².     Input and Output Summary (last 24 hours):     Intake/Output Summary (Last 24 hours) at 10/23/2024 1411  Last data filed at 10/23/2024 1300  Gross per 24 hour   Intake 435 ml   Output 800 ml   Net -365 ml       Physical Exam  Vitals reviewed.   Constitutional:       General: She is not in acute distress.     Appearance: She is not ill-appearing.   HENT:      Head: Normocephalic.   Eyes:      Conjunctiva/sclera: Conjunctivae normal.   Cardiovascular:      Rate and Rhythm: Normal rate and regular rhythm.   Pulmonary:      Effort: Pulmonary effort is normal.   Abdominal:      General: Abdomen is flat.      Palpations: Abdomen is soft.      Tenderness: There is no abdominal tenderness.   Skin:     General: Skin is warm and dry.   Neurological:      Mental Status: She is alert. She is disoriented.           Lines/Drains:  Lines/Drains/Airways        Active Status       Name Placement date Placement time Site Days    Urethral Catheter 10/08/24  --  --  15                  Urinary Catheter:  Goal for removal: N/A - Chronic Lam                 Lab Results: I have reviewed the following results:   Results from last 7 days   Lab Units 10/23/24  1050   WBC Thousand/uL 9.07   HEMOGLOBIN g/dL 12.8   HEMATOCRIT % 40.7   PLATELETS Thousands/uL 392*   SEGS PCT % 65   LYMPHO PCT % 22   MONO PCT % 9   EOS PCT % 3     Results from last 7 days   Lab Units 10/23/24  1050   SODIUM mmol/L 140   POTASSIUM mmol/L 3.7   CHLORIDE mmol/L 105   CO2 mmol/L 29   BUN mg/dL 14   CREATININE mg/dL 0.58*   ANION GAP mmol/L 6   CALCIUM mg/dL 9.0   ALBUMIN g/dL 3.4*   TOTAL BILIRUBIN mg/dL 0.21   ALK PHOS U/L 113*   ALT U/L 21   AST U/L 22   GLUCOSE RANDOM mg/dL 111         Results from last 7 days   Lab Units 10/19/24  2123   POC GLUCOSE mg/dl 136               Recent Cultures (last 7 days):         Imaging Results Review: No pertinent imaging studies reviewed.  Other Study Results Review: No additional pertinent studies reviewed.    Last 24 Hours Medication List:     Current Facility-Administered Medications:     acetaminophen (TYLENOL) oral suspension 320 mg, 4x Daily (with meals and at bedtime)    albuterol (PROVENTIL HFA,VENTOLIN HFA) inhaler 2 puff, Q4H PRN    buPROPion (WELLBUTRIN XL) 24 hr tablet 150 mg, Daily    clonazePAM (KlonoPIN) tablet 1 mg, TID    enoxaparin (LOVENOX) subcutaneous injection 40 mg, Daily    fentaNYL (DURAGESIC) 50 mcg/hr TD 72 hr patch 50 mcg, Q72H    gabapentin (NEURONTIN) oral solution 250 mg, TID    haloperidol (HALDOL) tablet 1 mg, BID PRN    HYDROmorphone (DILAUDID) injection 0.5 mg, Q3H PRN    loratadine (CLARITIN) tablet 10 mg, Daily    LORazepam (ATIVAN) injection 1 mg, Q6H PRN    LORazepam (ATIVAN) injection 2 mg, Q10 Min PRN    methocarbamol (ROBAXIN) tablet 500 mg, 4x Daily (with meals and at bedtime)    naloxone (NARCAN) intranasal 2 mg, Daily  PRN    nicotine (NICODERM CQ) 14 mg/24hr TD 24 hr patch 14 mg, Daily    nystatin (MYCOSTATIN) cream, BID    ondansetron (ZOFRAN) injection 4 mg, Q6H PRN    oxyCODONE (ROXICODONE) immediate release tablet 10 mg, Q3H PRN    oxyCODONE (ROXICODONE) IR tablet 5 mg, Q3H PRN    polyethylene glycol (MIRALAX) packet 17 g, Daily PRN    QUEtiapine (SEROquel XR) 24 hr tablet 150 mg, HS    senna (SENOKOT) tablet 17.2 mg, BID    venlafaxine (EFFEXOR-XR) 24 hr capsule 225 mg, Daily    Administrative Statements   Today, Patient Was Seen By: Mao Martinez MD  I have spent a total time of 35 minutes in caring for this patient on the day of the visit/encounter including Diagnostic results, Prognosis, Risks and benefits of tx options, Risk factor reductions, Documenting in the medical record, and Communicating with other healthcare professionals .    **Please Note: This note may have been constructed using a voice recognition system.**

## 2024-10-23 NOTE — ASSESSMENT & PLAN NOTE
Goals: Maintain safe-environment in hospital, with proportional symptom management.  Placed order for patient to be transitioned out-of-bed to chair TID.  Neuropsychology evaluated patient, and determined that she does not have capacity.  Incompetent persons cannot consent to hospice.  Patient has no willing or appropriate decision-makers:  Note: Case Management is presently assisting with arranging for guardianship.

## 2024-10-23 NOTE — PLAN OF CARE
Problem: PAIN - ADULT  Goal: Verbalizes/displays adequate comfort level or baseline comfort level  Description: Interventions:  - Encourage patient to monitor pain and request assistance  - Assess pain using appropriate pain scale  - Administer analgesics based on type and severity of pain and evaluate response  - Implement non-pharmacological measures as appropriate and evaluate response  - Consider cultural and social influences on pain and pain management  - Notify physician/advanced practitioner if interventions unsuccessful or patient reports new pain  Outcome: Progressing     Problem: INFECTION - ADULT  Goal: Absence or prevention of progression during hospitalization  Description: INTERVENTIONS:  - Assess and monitor for signs and symptoms of infection  - Monitor lab/diagnostic results  - Monitor all insertion sites, i.e. indwelling lines, tubes, and drains  - Monitor endotracheal if appropriate and nasal secretions for changes in amount and color  - [place] appropriate cooling/warming therapies per order  - Administer medications as ordered  - Instruct and encourage patient and family to use good hand hygiene technique  - Identify and instruct in appropriate isolation precautions for identified infection/condition  Outcome: Progressing     Problem: SAFETY ADULT  Goal: Patient will remain free of falls  Description: INTERVENTIONS:  - Educate patient/family on patient safety including physical limitations  - Instruct patient to call for assistance with activity   - Consult OT/PT to assist with strengthening/mobility   - Keep Call bell within reach  - Keep bed low and locked with side rails adjusted as appropriate  - Keep care items and personal belongings within reach  - Initiate and maintain comfort rounds  - Make Fall Risk Sign visible to staff  - Offer Toileting every 2 Hours, in advance of need  - Initiate/Maintain bed alarm  - Obtain necessary fall risk management equipment: assistive devices  - Apply  yellow socks and bracelet for high fall risk patients  - Consider moving patient to room near nurses station  Outcome: Progressing

## 2024-10-23 NOTE — PROGRESS NOTES
Progress Note - Palliative Care   Name: Alyssa Madrigal 64 y.o. female I MRN: [id number]  [facility] I Date of Admission: 10/8/2024   Date of Service: 10/23/2024 I Hospital Day: 15     Assessment & Plan  Osteomyelitis (HCC)  MRI Cervical Spine in July 2024: Infectious/inflammatory changes from the occiput to cervical spine, likely improved fluid collection in the left posterior paraspinal soft tissues at C1-2 level. Patient transitioned to hospice through [facility], however, was subsequently discharged due to suspected diversion of narcotics by her patient's sister (e.g. Jaycee).    Neuropsychology evaluated patient, and determined that she does not have capacity.  Note: Case Management is presently assisting with arranging for guardianship.  Discharge planning to Skilled-Nursing Facility for long-term care.  Major depressive disorder, recurrent episode with anxious distress (HCC)  Continue Bupropion 150 mg Daily (Anxious and Impulsive Behavior).  Continue Gabapentin 250 mg TID (Anxiety).  Continue Venlafaxine 225 mg Daily and Quetiapine 150 mg Bedtime.   Continue PRN regimen for anxiety, and agitation.  IVDU (intravenous drug user)  Patient will not be offered any controlled-substances on discharge, unless she is discharged to a skilled nursing facility.  Palliative care by specialist  Goals: Maintain safe-environment in hospital, with proportional symptom management.  Placed order for patient to be transitioned out-of-bed to chair TID.  Neuropsychology evaluated patient, and determined that she does not have capacity.  Incompetent persons cannot consent to hospice.  Patient has no willing or appropriate decision-makers:  Note: Case Management is presently assisting with arranging for guardianship.  Post laminectomy syndrome  Modified pain-regimen:  Continue Fentanyl Transdermal Patch at 50 mcg Q72HR.  Continue Gabapentin 250 mg TID (Scheduled).  Continue Acetaminophen 320 mg QID  (Scheduled).  Adjusted PRN regimen, as follows: Discontinued Dilaudid 0.5 mg Q3HR PRN (Severe Pain).  Posttraumatic stress disorder  Likely to benefit from safe environment and multimodal psychotropic management.    Tardive dyskinesia  Complicates ability to swallow pills; will trial liquids where possible.  Controlled substance agreement broken  Patient [facility] from [facility] due to suspected narcotic-diversion:  Patient will not be offered any controlled-substances on discharge, unless she is discharged to a skilled nursing facility.    Subjective:  Patient was seen and examined on the General Medical floor. Resting comfortably in bed, on my initial arrival. No acute events noted, overnight. Patient is only oriented to Person, on my evaluation today; and is unable to correctly identify Place, Time, or Situation. Her only complaint is request to get out-of-bed to chair. Will place those orders, accordingly.     Objective:  Vitals:    10/23/24 1412   BP: 109/75   Pulse: 105   Resp: 16   Temp: 99.1 °F (37.3 °C)   SpO2: 98%     Physical Exam:  General: Alert, and Oriented to Person Only; Chronically-Ill Appearing; Frail/Muscle-Wasting  Respiratory: Normal Work of Breathing; No supplemental oxygen requirement    WBC   Date Value Ref Range Status   10/23/2024 9.07 4.31 - 10.16 Thousand/uL Final   10/20/2024 9.21 4.31 - 10.16 Thousand/uL Final   10/08/2024 10.62 (H) 4.31 - 10.16 Thousand/uL Final   03/25/2015 6.19 4.31 - 10.16 Thousand/uL Final   12/29/2014 7.43 4.31 - 10.16 Thousand/uL Final   12/19/2014 10.51 (H) 4.31 - 10.16 Thousand/uL Final     Hemoglobin   Date Value Ref Range Status   10/23/2024 12.8 11.5 - 15.4 g/dL Final   10/20/2024 12.3 11.5 - 15.4 g/dL Final   10/08/2024 13.7 11.5 - 15.4 g/dL Final   11/06/2020 9.1 (L) 11.5 - 14.5 g/dL Final   11/05/2020 8.4 (L) 11.5 - 14.5 g/dL Final   10/31/2020 9.9 (L) 11.5 - 14.5 g/dL Final     Platelets   Date Value Ref Range Status    10/23/2024 392 (H) 149 - 390 Thousands/uL Final   10/20/2024 352 149 - 390 Thousands/uL Final   10/09/2024 365 149 - 390 Thousands/uL Final   03/25/2015 242 149 - 390 Thousand/uL Final   12/29/2014 317 149 - 390 Thousand/uL Final   12/19/2014 282 149 - 390 Thousand/uL Final     MCV   Date Value Ref Range Status   10/23/2024 92 82 - 98 fL Final   10/20/2024 92 82 - 98 fL Final   10/08/2024 94 82 - 98 fL Final   03/25/2015 89 82 - 98 fL Final   12/29/2014 88 82 - 98 fL Final   12/19/2014 92 82 - 98 fL Final      Sodium   Date Value Ref Range Status   03/25/2015 139 136 - 145 mmol/L Final   12/29/2014 138 136 - 145 mmol/L Final   12/17/2014 140 135 - 145 mmol/L Final     Potassium   Date Value Ref Range Status   10/23/2024 3.7 3.5 - 5.3 mmol/L Final   10/20/2024 3.9 3.5 - 5.3 mmol/L Final   10/09/2024 3.3 (L) 3.5 - 5.3 mmol/L Final   09/05/2024 3.2 (L) 3.5 - 5.2 mmol/L Final   09/03/2024 3.4 (L) 3.5 - 5.2 mmol/L Final   09/01/2024 3.6 3.5 - 5.2 mmol/L Final     Chloride   Date Value Ref Range Status   10/23/2024 105 96 - 108 mmol/L Final   10/20/2024 104 96 - 108 mmol/L Final   10/09/2024 104 96 - 108 mmol/L Final   09/05/2024 104 100 - 109 mmol/L Final   09/03/2024 106 100 - 109 mmol/L Final   09/01/2024 108 100 - 109 mmol/L Final     Carbon Dioxide   Date Value Ref Range Status   09/05/2024 26 21 - 31 mmol/L Final   09/03/2024 26 21 - 31 mmol/L Final   09/01/2024 23 21 - 31 mmol/L Final     CO2   Date Value Ref Range Status   10/23/2024 29 21 - 32 mmol/L Final   10/20/2024 27 21 - 32 mmol/L Final   10/09/2024 25 21 - 32 mmol/L Final     BUN   Date Value Ref Range Status   10/23/2024 14 5 - 25 mg/dL Final   10/20/2024 13 5 - 25 mg/dL Final   10/09/2024 23 5 - 25 mg/dL Final   09/05/2024 13 7 - 25 mg/dL Final   09/03/2024 14 7 - 25 mg/dL Final   09/01/2024 12 7 - 25 mg/dL Final     Creatinine   Date Value Ref Range Status   10/23/2024 0.58 (L) 0.60 - 1.30 mg/dL Final     Comment:     Standardized to IDMS reference  method   10/20/2024 0.63 0.60 - 1.30 mg/dL Final     Comment:     Standardized to IDMS reference method   10/09/2024 0.81 0.60 - 1.30 mg/dL Final     Comment:     Standardized to IDMS reference method   09/05/2024 0.72 0.40 - 1.10 mg/dL Final   09/03/2024 0.70 0.40 - 1.10 mg/dL Final   09/01/2024 0.64 0.40 - 1.10 mg/dL Final     Glucose   Date Value Ref Range Status   10/23/2024 111 65 - 140 mg/dL Final     Comment:     If the patient is fasting, the ADA then defines impaired fasting glucose as > 100 mg/dL and diabetes as > or equal to 123 mg/dL.   10/20/2024 121 65 - 140 mg/dL Final     Comment:     If the patient is fasting, the ADA then defines impaired fasting glucose as > 100 mg/dL and diabetes as > or equal to 123 mg/dL.   10/09/2024 81 65 - 140 mg/dL Final     Comment:     If the patient is fasting, the ADA then defines impaired fasting glucose as > 100 mg/dL and diabetes as > or equal to 123 mg/dL.   09/05/2024 121 (H) 65 - 99 mg/dL Final   09/03/2024 129 (H) 65 - 99 mg/dL Final   09/01/2024 135 (H) 65 - 99 mg/dL Final     eGFR, Non-   Date Value Ref Range Status   09/30/2021 88 >60 Final   07/15/2021 100 >60 Final   07/14/2021 100 >60 Final     eGFR,    Date Value Ref Range Status   09/30/2021 102 >60 Final   07/15/2021 116 >60 Final   07/14/2021 116 >60 Final     Calcium   Date Value Ref Range Status   10/23/2024 9.0 8.4 - 10.2 mg/dL Final   10/20/2024 9.1 8.4 - 10.2 mg/dL Final   10/09/2024 8.8 8.4 - 10.2 mg/dL Final   09/05/2024 9.4 8.5 - 10.1 mg/dL Final   09/03/2024 9.1 8.5 - 10.1 mg/dL Final   09/01/2024 8.9 8.5 - 10.1 mg/dL Final     Total Protein   Date Value Ref Range Status   03/25/2015 5.9 (L) 6.4 - 8.2 g/dL Final   12/29/2014 6.1 (L) 6.4 - 8.2 g/dL Final   12/17/2014 5.7 (L) 6.4 - 8.2 g/dL Final     Albumin   Date Value Ref Range Status   10/23/2024 3.4 (L) 3.5 - 5.0 g/dL Final   10/09/2024 3.1 (L) 3.5 - 5.0 g/dL Final   10/09/2024 2.8 (L) 3.5 - 5.0 g/dL Final      ALBUMIN   Date Value Ref Range Status   08/27/2024 3.3 (L) 3.5 - 5.7 g/dL Final   08/26/2024 3.2 (L) 3.5 - 5.7 g/dL Final   11/14/2023 3.8 3.5 - 5.7 g/dL Final     Total Bilirubin   Date Value Ref Range Status   10/23/2024 0.21 0.20 - 1.00 mg/dL Final     Comment:     Use of this assay is not recommended for patients undergoing treatment with eltrombopag due to the potential for falsely elevated results.  N-acetyl-p-benzoquinone imine (metabolite of Acetaminophen) will generate erroneously low results in samples for patients that have taken an overdose of Acetaminophen.   10/09/2024 0.44 0.20 - 1.00 mg/dL Final     Comment:     Use of this assay is not recommended for patients undergoing treatment with eltrombopag due to the potential for falsely elevated results.  N-acetyl-p-benzoquinone imine (metabolite of Acetaminophen) will generate erroneously low results in samples for patients that have taken an overdose of Acetaminophen.   10/09/2024 0.38 0.20 - 1.00 mg/dL Final     Comment:     Use of this assay is not recommended for patients undergoing treatment with eltrombopag due to the potential for falsely elevated results.  N-acetyl-p-benzoquinone imine (metabolite of Acetaminophen) will generate erroneously low results in samples for patients that have taken an overdose of Acetaminophen.   08/27/2024 0.3 0.2 - 1.0 mg/dL Final     Comment:     Eltrombopag and its metabolites may interfere with this assay causing erroneously high patient results.   08/26/2024 0.3 0.2 - 1.0 mg/dL Final     Comment:     Eltrombopag and its metabolites may interfere with this assay causing erroneously high patient results.   11/14/2023 0.6 0.2 - 1.0 mg/dL Final     Comment:     Eltrombopag and its metabolites may interfere with this assay causing erroneously high patient results.     Alkaline Phosphatase   Date Value Ref Range Status   10/23/2024 113 (H) 34 - 104 U/L Final   10/09/2024 123 (H) 34 - 104 U/L Final   10/09/2024 111  "(H) 34 - 104 U/L Final   08/27/2024 84 35 - 120 U/L Final   08/26/2024 81 35 - 120 U/L Final   11/14/2023 165 (H) 35 - 120 U/L Final     AST   Date Value Ref Range Status   10/23/2024 22 13 - 39 U/L Final   10/09/2024 13 13 - 39 U/L Final   10/09/2024 15 13 - 39 U/L Final   08/27/2024 9 <41 U/L Final   08/26/2024 10 <41 U/L Final   11/14/2023 15 <41 U/L Final     ALT   Date Value Ref Range Status   10/23/2024 21 7 - 52 U/L Final     Comment:     Specimen collection should occur prior to Sulfasalazine administration due to the potential for falsely depressed results.    10/09/2024 12 7 - 52 U/L Final     Comment:     Specimen collection should occur prior to Sulfasalazine administration due to the potential for falsely depressed results.    10/09/2024 10 7 - 52 U/L Final     Comment:     Specimen collection should occur prior to Sulfasalazine administration due to the potential for falsely depressed results.    08/27/2024 7 <56 U/L Final   08/26/2024 7 <56 U/L Final   11/14/2023 11 <56 U/L Final    No results found for: \"LDH\"   TSH   Date Value Ref Range Status   06/19/2023 2.19 0.45 - 5.33 uIU/mL Final   04/20/2022 0.98 0.36 - 3.74 uIU/mL Final     Comment:       Ordering Provider: EMMANUELLE OWUSU   09/30/2021 1.99 0.36 - 3.74 uIU/mL Final    No results found for: \"V1EBIRA\" No results found for: \"FREET4\"     Patient was seen and discussed with attending physician, LAURIE Beltran D.O.  Hematology-Oncology Fellow (PGY-5)   "

## 2024-10-23 NOTE — ASSESSMENT & PLAN NOTE
Patient will not be offered any controlled-substances on discharge, unless she is discharged to a skilled nursing facility.

## 2024-10-23 NOTE — ASSESSMENT & PLAN NOTE
Patient was discharged from [facility] due to suspected narcotic-diversion:  Patient will not be offered any controlled-substances on discharge, unless she is discharged to a skilled nursing facility.

## 2024-10-23 NOTE — PLAN OF CARE
Problem: PAIN - ADULT  Goal: Verbalizes/displays adequate comfort level or baseline comfort level  Description: Interventions:  - Encourage patient to monitor pain and request assistance  - Assess pain using appropriate pain scale  - Administer analgesics based on type and severity of pain and evaluate response  - Implement non-pharmacological measures as appropriate and evaluate response  - Consider cultural and social influences on pain and pain management  - Notify physician/advanced practitioner if interventions unsuccessful or patient reports new pain  Outcome: Progressing     Problem: INFECTION - ADULT  Goal: Absence or prevention of progression during hospitalization  Description: INTERVENTIONS:  - Assess and monitor for signs and symptoms of infection  - Monitor lab/diagnostic results  - Monitor all insertion sites, i.e. indwelling lines, tubes, and drains  - Monitor endotracheal if appropriate and nasal secretions for changes in amount and color  - [place] appropriate cooling/warming therapies per order  - Administer medications as ordered  - Instruct and encourage patient and family to use good hand hygiene technique  - Identify and instruct in appropriate isolation precautions for identified infection/condition  Outcome: Progressing     Problem: SAFETY ADULT  Goal: Patient will remain free of falls  Description: INTERVENTIONS:  - Educate patient/family on patient safety including physical limitations  - Instruct patient to call for assistance with activity   - Consult OT/PT to assist with strengthening/mobility   - Keep Call bell within reach  - Keep bed low and locked with side rails adjusted as appropriate  - Keep care items and personal belongings within reach  - Initiate and maintain comfort rounds  - Make Fall Risk Sign visible to staff  - Offer Toileting every 2 Hours, in advance of need  - Initiate/Maintain bed alarm  - Obtain necessary fall risk management equipment: assistive devices  - Apply  yellow socks and bracelet for high fall risk patients  - Consider moving patient to room near nurses station  Outcome: Progressing  Goal: Maintain or return to baseline ADL function  Description: INTERVENTIONS:  -  Assess patient's ability to carry out ADLs; assess patient's baseline for ADL function and identify physical deficits which impact ability to perform ADLs (bathing, care of mouth/teeth, toileting, grooming, dressing, etc.)  - Assess/evaluate cause of self-care deficits   - Assess range of motion  - Assess patient's mobility; develop plan if impaired  - Assess patient's need for assistive devices and provide as appropriate  - Encourage maximum independence but intervene and supervise when necessary  - Involve family in performance of ADLs  - Assess for home care needs following discharge   - Consider OT consult to assist with ADL evaluation and planning for discharge  - Provide patient education as appropriate  Outcome: Progressing  Goal: Maintains/Returns to pre admission functional level  Description: INTERVENTIONS:  - Perform AM-PAC 6 Click Basic Mobility/ Daily Activity assessment daily.  - Set and communicate daily mobility goal to care team and patient/family/caregiver.   - Collaborate with rehabilitation services on mobility goals if consulted  - Out of bed for toileting  - Record patient progress and toleration of activity level   Outcome: Progressing     Problem: SAFETY,RESTRAINT: NV/NON-SELF DESTRUCTIVE BEHAVIOR  Goal: Remains free of harm/injury (restraint for non violent/non self-detsructive behavior)  Description: INTERVENTIONS:  - Instruct patient/family regarding restraint use   - Assess and monitor physiologic and psychological status   - Provide interventions and comfort measures to meet assessed patient needs   - Identify and implement measures to help patient regain control  - Assess readiness for release of restraint   Outcome: Progressing  Goal: Returns to optimal restraint-free  functioning  Description: INTERVENTIONS:  - Assess the patient's behavior and symptoms that indicate continued need for restraint  - Identify and implement measures to help patient regain control  - Assess readiness for release of restraint   Outcome: Progressing     Problem: MUSCULOSKELETAL - ADULT  Goal: Maintain or return mobility to safest level of function  Description: INTERVENTIONS:  - Assess patient's ability to carry out ADLs; assess patient's baseline for ADL function and identify physical deficits which impact ability to perform ADLs (bathing, care of mouth/teeth, toileting, grooming, dressing, etc.)  - Assess/evaluate cause of self-care deficits   - Assess range of motion  - Assess patient's mobility  - Assess patient's need for assistive devices and provide as appropriate  - Encourage maximum independence but intervene and supervise when necessary  - Involve family in performance of ADLs  - Assess for home care needs following discharge   - Consider OT consult to assist with ADL evaluation and planning for discharge  - Provide patient education as appropriate  Outcome: Progressing  Goal: Maintain proper alignment of affected body part  Description: INTERVENTIONS:  - Support, maintain and protect limb and body alignment  - Provide patient/ family with appropriate education  Outcome: Progressing

## 2024-10-23 NOTE — ASSESSMENT & PLAN NOTE
MRI in 7/2024  Infectious/inflammatory changes from the occiput to cervical spine as   discussed above. Likely improved fluid collection in the left posterior   paraspinal soft tissues at C1-2 level. Otherwise no significant change from   06/29/2023 including epidural soft tissue thickening and enhancement and fluid   collection along the right aspect of C1-C2 joint and prevertebral space.   Patient went to hospice and completed 3 months of doxycycline  Still complains of pain - palliative care following for assistance with pain management  Pt was discharged for [facility] hospice services due to suspected diversion of narcotics by her sister Jaycee.  Pt was sent to the ED at [facility] for an opinion regarding plan of care  Palliative care and hospice services are following  palliative care service following - changed methadone to fentanyl patch to assist with discharge planning to a facility  Pt reports adequate pain relief at this time  Seen by neuropsychology - pt does not have capacity  Discharge planning to SNF for long term care  Will need guardianship

## 2024-10-23 NOTE — ASSESSMENT & PLAN NOTE
Modified pain-regimen:  Continue Fentanyl Transdermal Patch at 50 mcg Q72HR.  Continue Gabapentin 250 mg TID (Scheduled).  Continue Acetaminophen 320 mg QID (Scheduled).  Adjusted PRN regimen, as follows: Discontinued Dilaudid 0.5 mg Q3HR PRN (Severe Pain).

## 2024-10-24 LAB
ALBUMIN SERPL BCG-MCNC: 3.1 G/DL (ref 3.5–5)
ALP SERPL-CCNC: 104 U/L (ref 34–104)
ALT SERPL W P-5'-P-CCNC: 19 U/L (ref 7–52)
ANION GAP SERPL CALCULATED.3IONS-SCNC: 7 MMOL/L (ref 4–13)
AST SERPL W P-5'-P-CCNC: 16 U/L (ref 13–39)
BASOPHILS # BLD AUTO: 0.07 THOUSANDS/ΜL (ref 0–0.1)
BASOPHILS NFR BLD AUTO: 1 % (ref 0–1)
BILIRUB SERPL-MCNC: 0.17 MG/DL (ref 0.2–1)
BUN SERPL-MCNC: 15 MG/DL (ref 5–25)
CALCIUM ALBUM COR SERPL-MCNC: 9.4 MG/DL (ref 8.3–10.1)
CALCIUM SERPL-MCNC: 8.7 MG/DL (ref 8.4–10.2)
CHLORIDE SERPL-SCNC: 105 MMOL/L (ref 96–108)
CO2 SERPL-SCNC: 28 MMOL/L (ref 21–32)
CREAT SERPL-MCNC: 0.64 MG/DL (ref 0.6–1.3)
EOSINOPHIL # BLD AUTO: 0.24 THOUSAND/ΜL (ref 0–0.61)
EOSINOPHIL NFR BLD AUTO: 4 % (ref 0–6)
ERYTHROCYTE [DISTWIDTH] IN BLOOD BY AUTOMATED COUNT: 15.6 % (ref 11.6–15.1)
GFR SERPL CREATININE-BSD FRML MDRD: 95 ML/MIN/1.73SQ M
GLUCOSE SERPL-MCNC: 82 MG/DL (ref 65–140)
HCT VFR BLD AUTO: 34.9 % (ref 34.8–46.1)
HGB BLD-MCNC: 10.7 G/DL (ref 11.5–15.4)
IMM GRANULOCYTES # BLD AUTO: 0.01 THOUSAND/UL (ref 0–0.2)
IMM GRANULOCYTES NFR BLD AUTO: 0 % (ref 0–2)
LYMPHOCYTES # BLD AUTO: 2.05 THOUSANDS/ΜL (ref 0.6–4.47)
LYMPHOCYTES NFR BLD AUTO: 37 % (ref 14–44)
MAGNESIUM SERPL-MCNC: 1.9 MG/DL (ref 1.9–2.7)
MCH RBC QN AUTO: 28.1 PG (ref 26.8–34.3)
MCHC RBC AUTO-ENTMCNC: 30.7 G/DL (ref 31.4–37.4)
MCV RBC AUTO: 92 FL (ref 82–98)
MONOCYTES # BLD AUTO: 0.6 THOUSAND/ΜL (ref 0.17–1.22)
MONOCYTES NFR BLD AUTO: 11 % (ref 4–12)
NEUTROPHILS # BLD AUTO: 2.53 THOUSANDS/ΜL (ref 1.85–7.62)
NEUTS SEG NFR BLD AUTO: 47 % (ref 43–75)
NRBC BLD AUTO-RTO: 0 /100 WBCS
PLATELET # BLD AUTO: 347 THOUSANDS/UL (ref 149–390)
PMV BLD AUTO: 9.6 FL (ref 8.9–12.7)
POTASSIUM SERPL-SCNC: 3.8 MMOL/L (ref 3.5–5.3)
PROT SERPL-MCNC: 5.4 G/DL (ref 6.4–8.4)
RBC # BLD AUTO: 3.81 MILLION/UL (ref 3.81–5.12)
SODIUM SERPL-SCNC: 140 MMOL/L (ref 135–147)
WBC # BLD AUTO: 5.5 THOUSAND/UL (ref 4.31–10.16)

## 2024-10-24 PROCEDURE — 97163 PT EVAL HIGH COMPLEX 45 MIN: CPT

## 2024-10-24 PROCEDURE — 85025 COMPLETE CBC W/AUTO DIFF WBC: CPT | Performed by: FAMILY MEDICINE

## 2024-10-24 PROCEDURE — 83735 ASSAY OF MAGNESIUM: CPT | Performed by: FAMILY MEDICINE

## 2024-10-24 PROCEDURE — 80053 COMPREHEN METABOLIC PANEL: CPT | Performed by: FAMILY MEDICINE

## 2024-10-24 PROCEDURE — 97167 OT EVAL HIGH COMPLEX 60 MIN: CPT

## 2024-10-24 PROCEDURE — 99232 SBSQ HOSP IP/OBS MODERATE 35: CPT | Performed by: FAMILY MEDICINE

## 2024-10-24 PROCEDURE — 99233 SBSQ HOSP IP/OBS HIGH 50: CPT | Performed by: INTERNAL MEDICINE

## 2024-10-24 RX ORDER — OXYCODONE HYDROCHLORIDE 10 MG/1
10 TABLET ORAL
Status: DISCONTINUED | OUTPATIENT
Start: 2024-10-24 | End: 2024-10-28 | Stop reason: HOSPADM

## 2024-10-24 RX ORDER — NYSTATIN 100000 U/G
CREAM TOPICAL ONCE
Status: COMPLETED | OUTPATIENT
Start: 2024-10-24 | End: 2024-10-24

## 2024-10-24 RX ORDER — LIDOCAINE 50 MG/G
1 PATCH TOPICAL DAILY
Status: DISCONTINUED | OUTPATIENT
Start: 2024-10-24 | End: 2024-10-28 | Stop reason: HOSPADM

## 2024-10-24 RX ADMIN — QUETIAPINE 150 MG: 150 TABLET, FILM COATED, EXTENDED RELEASE ORAL at 20:45

## 2024-10-24 RX ADMIN — GABAPENTIN 250 MG: 250 SOLUTION ORAL at 17:32

## 2024-10-24 RX ADMIN — METHOCARBAMOL 500 MG: 500 TABLET ORAL at 17:32

## 2024-10-24 RX ADMIN — ACETAMINOPHEN 320 MG: 650 SUSPENSION ORAL at 20:37

## 2024-10-24 RX ADMIN — ACETAMINOPHEN 320 MG: 650 SUSPENSION ORAL at 10:08

## 2024-10-24 RX ADMIN — CLONAZEPAM 1 MG: 1 TABLET ORAL at 21:30

## 2024-10-24 RX ADMIN — ENOXAPARIN SODIUM 40 MG: 40 INJECTION SUBCUTANEOUS at 10:08

## 2024-10-24 RX ADMIN — OXYCODONE HYDROCHLORIDE 10 MG: 10 TABLET ORAL at 11:52

## 2024-10-24 RX ADMIN — BUPROPION HYDROCHLORIDE 150 MG: 150 TABLET, EXTENDED RELEASE ORAL at 10:08

## 2024-10-24 RX ADMIN — NYSTATIN: 100000 CREAM TOPICAL at 10:09

## 2024-10-24 RX ADMIN — GABAPENTIN 250 MG: 250 SOLUTION ORAL at 20:37

## 2024-10-24 RX ADMIN — CLONAZEPAM 1 MG: 1 TABLET ORAL at 10:08

## 2024-10-24 RX ADMIN — LORATADINE 10 MG: 10 TABLET ORAL at 10:08

## 2024-10-24 RX ADMIN — METHOCARBAMOL 500 MG: 500 TABLET ORAL at 13:54

## 2024-10-24 RX ADMIN — ACETAMINOPHEN 320 MG: 650 SUSPENSION ORAL at 13:54

## 2024-10-24 RX ADMIN — SENNOSIDES 17.2 MG: 8.6 TABLET, FILM COATED ORAL at 17:32

## 2024-10-24 RX ADMIN — LIDOCAINE 1 PATCH: 50 PATCH TOPICAL at 20:54

## 2024-10-24 RX ADMIN — NYSTATIN: 100000 CREAM TOPICAL at 17:33

## 2024-10-24 RX ADMIN — OXYCODONE HYDROCHLORIDE 15 MG: 5 TABLET ORAL at 15:24

## 2024-10-24 RX ADMIN — METHOCARBAMOL 500 MG: 500 TABLET ORAL at 20:37

## 2024-10-24 RX ADMIN — METHOCARBAMOL 500 MG: 500 TABLET ORAL at 10:08

## 2024-10-24 RX ADMIN — SENNOSIDES 17.2 MG: 8.6 TABLET, FILM COATED ORAL at 10:08

## 2024-10-24 RX ADMIN — OXYCODONE HYDROCHLORIDE 15 MG: 5 TABLET ORAL at 20:40

## 2024-10-24 RX ADMIN — ACETAMINOPHEN 320 MG: 650 SUSPENSION ORAL at 17:32

## 2024-10-24 RX ADMIN — VENLAFAXINE HYDROCHLORIDE 225 MG: 150 CAPSULE, EXTENDED RELEASE ORAL at 10:09

## 2024-10-24 RX ADMIN — NYSTATIN: 100000 CREAM TOPICAL at 20:54

## 2024-10-24 RX ADMIN — CLONAZEPAM 1 MG: 1 TABLET ORAL at 17:32

## 2024-10-24 RX ADMIN — NICOTINE 14 MG: 14 PATCH, EXTENDED RELEASE TRANSDERMAL at 10:21

## 2024-10-24 RX ADMIN — GABAPENTIN 250 MG: 250 SOLUTION ORAL at 10:09

## 2024-10-24 NOTE — CERTIFIED RECOVERY SPECIALIST
Certified  Note    Patient name: Alyssa Madrigal  Location: [facility] 815/[facility] 815-01  [place]ission: [facility]  Attending:  Mao Martinez MD MRN [id number]  : 1960  Age: 64 y.o.    Sex: female Date 10/24/2024         Substance Use History:     Social History     Substance and Sexual Activity   Alcohol Use Not Currently        Social History     Substance and Sexual Activity   Drug Use Not Currently    Types: Heroin, Marijuana    Comment: heroin: 19  marijuana: 19       Time spent 20 mins     CRS in room with patient to provide support and conversation while she is [facility]     Patient pleasant and enjoyed company         Carina Low

## 2024-10-24 NOTE — ASSESSMENT & PLAN NOTE
MRI in 7/2024  Infectious/inflammatory changes from the occiput to cervical spine as   discussed above. Likely improved fluid collection in the left posterior   paraspinal soft tissues at C1-2 level. Otherwise no significant change from   06/29/2023 including epidural soft tissue thickening and enhancement and fluid   collection along the right aspect of C1-C2 joint and prevertebral space.   Patient went to hospice and completed 3 months of doxycycline  Still complains of pain - palliative care following for assistance with pain management  Pt was discharged for [facility] hospice services due to suspected diversion of narcotics by her sister Jaycee.  Palliative care and hospice services are following  palliative care service following - changed methadone to fentanyl patch to assist with discharge planning to a facility  Seen by neuropsychology - pt does not have capacity  Discharge planning to SNF for long term care  Will need guardianship

## 2024-10-24 NOTE — PLAN OF CARE
Problem: PHYSICAL THERAPY ADULT  Goal: Performs mobility at highest level of function for planned discharge setting.  See evaluation for individualized goals.  Description: Treatment/Interventions: Functional transfer training, Therapeutic exercise, Bed mobility, Spoke to nursing, Spoke to case management, OT          See flowsheet documentation for full assessment, interventions and recommendations.  Note: Prognosis: Fair  Problem List: Impaired balance, Decreased mobility, Decreased endurance, Decreased strength, Decreased safety awareness, Impaired judgement, Decreased cognition, Pain, Orthopedic restrictions  Assessment: Pt is a 64 y.o. female seen for PT evaluation s/p admit to [facility] on 10/8/2024. Pt was admitted with a primary dx of: Osteomyelitis, Tardive dyskinesia, Fall.Pt has a past medical history of Back pain, Drug use, Heroin abuse (HCC), and Neck pain.   PT now consulted for assessment of mobility and d/c needs. Pt with OOB to chair orders.Pts current clinical presentation is Unstable/ Unpredictable (high complexity) due to Ongoing medical management for primary dx, Decreased activity tolerance compared to baseline, Fall risk, Increased assistance needed from caregiver at current time, Cog status, Spinal precautions at current time. Pt unable to provide details for PLOF, but from previous notes was Ind for mobility prior to all hospital admissions. Pt has been on hospice services in the past, with family rescinding that. PT eval ordered by provider as pt is Level 3 at this time and requests to be OOB. Details of NS recommendation and medical co morbidities in the flowsheet. Upon evaluation, pt currently is requiring Mo Michael 1 for bed mobility , then requiring assist to maintain sitting balance. Performs SPS transfer from bed to drop arm recliner with Max Assist x 2 , HHA and sacral support. Pt is non ambulatory at baseline, w/c dependent. Pt presents at PT eval functioning below  baseline and currently w/ overall mobility deficits 2* to: BLE weakness, impaired balance, decreased endurance, pain, decreased activity tolerance compared to baseline, decreased functional mobility tolerance compared to baseline, decreased safety awareness, impaired judgement, fall risk, orthopedic restrictions. Pt currently at a fall risk 2* to impairments listed above.  Pt will continue to benefit from skilled acute PT interventions to address stated impairments; to maximize functional mobility; for ongoing pt/ family training; and DME needs. At conclusion of PT session chair alarm engaged, all needs in reach, RN notified of session findings/recommendations, and pt returned back in recliner chair with phone and call bell within reach. Pt denies any further questions at this time. The patient's AM-PAC Basic Mobility Inpatient Short Form Raw Score is 10. A Raw score of less than or equal to 16 suggests the patient may benefit from discharge to post-acute rehabilitation services. Please also refer to the recommendation of the Physical Therapist for safe discharge planning. Recommend Level II upon hospital D/C, may need HLOC pending further progress.  Barriers to Discharge: Decreased caregiver support, Inaccessible home environment     Rehab Resource Intensity Level, PT: II (Moderate Resource Intensity) (may need HLOC pending further progress)    See flowsheet documentation for full assessment.

## 2024-10-24 NOTE — PLAN OF CARE
"  Problem: OCCUPATIONAL THERAPY ADULT  Goal: Performs self-care activities at highest level of function for planned discharge setting.  See evaluation for individualized goals.  Description: Treatment Interventions: ADL retraining, Functional transfer training, UE strengthening/ROM, Endurance training, Patient/family training, Cognitive reorientation, Equipment evaluation/education, Fine motor coordination activities, Compensatory technique education, Continued evaluation, Energy conservation, Activityengagement          See flowsheet documentation for full assessment, interventions and recommendations.   Note: Limitation: Decreased ADL status, Decreased Safe judgement during ADL, Decreased cognition, Decreased endurance, Decreased UE ROM, Decreased fine motor control, Decreased self-care trans, Decreased high-level ADLs  Prognosis: Fair  Assessment: 64 year old pt seen today for an OT evaluation following admission to [facility] 2/2 osteomyelitis with present symptoms significant for pain, fatigue, weakness, decreased ADL status, decreased functional mobility. Pt  has a past medical history of Back pain, Drug use, Heroin abuse (HCC), and Neck pain. Pt with complex hx and PLOF. Per chart and CM, pt recently in hospice care, presents here from [facility]. Pt was sent home on hospice from [facility] on 9/5/24. Pt lives alone, but her sister Jaycee is/was in the process of moving in with pt.  Jaycee assists pt at home but does not provide consistent care,\" and is currently no longer to communicate with pt. Since presenting here to B, \"Pt expressed that she wants to \"get better and does not want to feel this way any longer\". Pt states that she wants to go to rehab to get stronger and return to her \"normal\"\".  Pt was requiring assist for ADLs/IADLs, and was able to complete txfs to w/c. Per MD, pt no longer on hospice and appropriate to see for therapy, and neurosx cleared pt to work with therapy with " c/s brace.  Pt very pleasant and cooperative t/o session. Pt completed functional bed mobility with mod Ax1. Max Ax2 for functional STS txfs with b/l HHA. Pt was mod A for UB ADLs and  max A for LB ADLs. The patient's raw score on the AM-PAC Daily Activity Inpatient Short Form is 12. A raw score of less than 19 suggests the patient may benefit from discharge to post-acute rehabilitation services. Please refer to the recommendation of the Occupational Therapist for safe discharge planning. Pt is functioning below baseline level of function and will continue to benefit from skilled acute OT to promote increased independence and return to PLOF. At this time, current OT recommendation is Level 2 resources vs higher level of care anticipated. Will continue to follow and assess.     Rehab Resource Intensity Level, OT: II (Moderate Resource Intensity) (Level 2 vs higher level of care may be required.)

## 2024-10-24 NOTE — ASSESSMENT & PLAN NOTE
MRI Cervical Spine in July 2024: Infectious/inflammatory changes from the occiput to cervical spine, likely improved fluid collection in the left posterior paraspinal soft tissues at C1-2 level. Patient transitioned to hospice through [facility], however, was subsequently discharged due to suspected diversion of narcotics by her patient's sister (Jaycee).    Neuropsychology evaluated patient, and determined that she does not have capacity.  Note: Case Management is presently assisting with arranging for guardianship.  Discharge planning to Skilled-Nursing Facility for long-term care.

## 2024-10-24 NOTE — ASSESSMENT & PLAN NOTE
Patient was discharged from [facility] due to suspected narcotic-diversion:  Patient will not be offered any controlled-substances on discharge, unless she is discharged to a skilled nursing facility.

## 2024-10-24 NOTE — PHYSICAL THERAPY NOTE
Physical Therapy Evaluation     Patient's Name: Alyssa Madrigal    Admitting Diagnosis  Hospice care [Z51.5]  Known medical problems [Z78.9]  Acute hematogenous osteomyelitis, unspecified site ([facility]) [M86.00]    Problem List  Patient Active Problem List   Diagnosis    B12 deficiency    Chronic back pain    Cocaine abuse (HCC)    Controlled substance agreement broken    COPD (chronic obstructive pulmonary disease) with chronic bronchitis (HCC)    DDD (degenerative disc disease), lumbosacral    Discitis of lumbar region    Depression    Major depressive disorder, recurrent episode with anxious distress (HCC)    Mechanical breakdown of internal orthopedic device ([facility])    Osteoarthrosis, unspecified whether generalized or localized, pelvic region and thigh    Osteomyelitis (HCC)    Post laminectomy syndrome    Posttraumatic stress disorder    Prolonged Q-T interval on ECG    Right foot drop    Tobacco abuse    Left forearm abscess    IVDU (intravenous drug user)    history of Hepatitis C    Sacral decubitus ulcer (present on admission)     Aftercare following surgery of the musculoskeletal system    Right hip pain    Fall    Palliative care by specialist    Tardive dyskinesia       Past Medical History  Past Medical History:   Diagnosis Date    Back pain     Drug use     Heroin abuse (HCC)     Neck pain        Past Surgical History  Past Surgical History:   Procedure Laterality Date    BACK SURGERY      fusion    FL GUIDED NEEDLE PLAC BX/ASP/INJ  7/23/2020    FL GUIDED NEEDLE PLAC BX/ASP/INJ  7/23/2020    FL GUIDED NEEDLE PLAC BX/ASP/INJ  11/3/2020    FL GUIDED NEEDLE PLAC BX/ASP/INJ  6/25/2021    HIP SURGERY Bilateral     NECK SURGERY      fusion          10/24/24 0929   PT Last Visit   PT Visit Date 10/24/24   Note Type   Note type Evaluation   Education   Education Provided Yes   Pain Assessment   Pain Assessment Tool 0-10   Pain Score 8   Pain Location/Orientation Location: Neck;Location: Head   Pain  "Onset/Description Onset: Ongoing   Effect of Pain on Daily Activities Increased time for activity   Patient's Stated Pain Goal No pain   Hospital Pain Intervention(s) Repositioned;Ambulation/increased activity;Emotional support   Restrictions/Precautions   Weight Bearing Precautions Per Order No   Braces or Orthoses (S)  C/S Collar  (Per Neuro Sx note \" VISTA collar ordered to help with pain control PRN. Recommend using if patient works with therapy for some aspect of protection. Ok to remove when in chair, bed, eating, and sleeping. \")   Other Precautions Cognitive;Chair Alarm;Bed Alarm;Multiple lines;Fall Risk;Pain   Home Living   Type of Home House   Home Layout One level;Stairs to enter with rails  (14 BILL)   Home Equipment Wheelchair-manual  (prior notes state pt being non ambulatory, w/c bound)   Prior Function   Level of [place] Modified independent with wheelchair   Lives With Daughter  (from prior notes)   Comments Unable to obtain details regarding PLOF   General   Additional Pertinent History (S)  Per NS \"Patient with reported history of cervical epidural abscess s/p fusion  .Remote h/o C4-5 ACDF , C1-5 PCDF March 2023  for cervical stenosis s/p fall out of bed with progressive myelopathy. Admission in August 2024 showed epidural abscess with infection up to suboccipital area with evidence of hardware lucency;.NSX recommended hardware removal and cervical traction (HALO) but patient refused and d/c with home hospice and oral antibiotics.Notes the day PTA show patient discharged from home hospice for medication diversion, [facility] recommended but family refused and took patient off hospice care. Pt now again off comfort care and level 3 DNR/DNI\".   Family/Caregiver Present No   Cognition   Overall Cognitive Status Impaired   Arousal/Participation Responsive   Orientation Level Oriented to person;Oriented to place;Oriented to time   Following Commands Follows one step commands with increased time or " "repetition   Comments Pt cooperative and very pleasant   Subjective   Subjective \"Yay, I want to get up OOB\"   RLE Assessment   RLE Assessment X  (DF- 2+/5, Knee ext- 2+/5. Hip flexion(Lateral deviation of LE with attempt)- 2+/5)   LLE Assessment   LLE Assessment X  (grossly 3/5)   Light Touch   RLE Light Touch Grossly intact   LLE Light Touch Grossly intact   Bed Mobility   Supine to Sit 3  Moderate assistance   Additional items Assist x 1;HOB elevated;Bedrails;Increased time required;LE management;Impulsive;Verbal cues   Sit to Supine Unable to assess   Additional Comments Pt in bed upon arrival. Assisted with Cx brace in bed. [place] sitting EOB with Min Assist for trunk manegemtn and posture correction, posterior lean, with difficulty placing feet on floor   Transfers   Sit to Stand 2  Maximal assistance   Additional items Assist x 2;Increased time required;Verbal cues   Stand to Sit 2  Maximal assistance   Additional items Assist x 2;Increased time required;Verbal cues   Stand pivot 2  Maximal assistance   Additional items Assist x 2;Increased time required;Verbal cues   Additional Comments w/ HHA + sacral support- VCs for posture correction  - Pt has custom shoes with RLE lift .   Ambulation/Elevation   Gait pattern Not appropriate   Ambulation/Elevation Additional Comments Pt is non ambulatory ,prior notes state Mod I at w/c level.   Balance   Static Sitting Fair -   Dynamic Sitting Poor +   Static Standing Poor   Dynamic Standing Poor -   Endurance Deficit   Endurance Deficit Yes   Activity Tolerance   Activity Tolerance Patient limited by pain;Patient limited by fatigue   Medical Staff Made Aware Co-eval with OT 2*medical complexity and multiple co morbidities   Nurse Made Aware RN cleared pt for therapy   Assessment   Prognosis Fair   Problem List Impaired balance;Decreased mobility;Decreased endurance;Decreased strength;Decreased safety awareness;Impaired judgement;Decreased cognition;Pain;Orthopedic " restrictions   Assessment Pt is a 64 y.o. female seen for PT evaluation s/p admit to [facility] on 10/8/2024. Pt was admitted with a primary dx of: Osteomyelitis, Tardive dyskinesia, Fall.Pt has a past medical history of Back pain, Drug use, Heroin abuse (HCC), and Neck pain.   PT now consulted for assessment of mobility and d/c needs. Pt with OOB to chair orders.Pts current clinical presentation is Unstable/ Unpredictable (high complexity) due to Ongoing medical management for primary dx, Decreased activity tolerance compared to baseline, Fall risk, Increased assistance needed from caregiver at current time, Cog status, Spinal precautions at current time. Pt unable to provide details for PLOF, but from previous notes was Ind for mobility prior to all hospital admissions. Pt has been on hospice services in the past, with family rescinding that. PT eval ordered by provider as pt is Level 3 at this time and requests to be OOB. Details of NS recommendation and medical co morbidities in the flowsheet. Upon evaluation, pt currently is requiring [place] 1 for bed mobility , then requiring assist to maintain sitting balance. Performs SPS transfer from bed to drop arm recliner with Max Assist x 2 , HHA and sacral support. Pt is non ambulatory at baseline, w/c dependent. Pt presents at PT eval functioning below baseline and currently w/ overall mobility deficits 2* to: BLE weakness, impaired balance, decreased endurance, pain, decreased activity tolerance compared to baseline, decreased functional mobility tolerance compared to baseline, decreased safety awareness, impaired judgement, fall risk, orthopedic restrictions. Pt currently at a fall risk 2* to impairments listed above.  Pt will continue to benefit from skilled acute PT interventions to address stated impairments; to maximize functional mobility; for ongoing pt/ family training; and DME needs. At conclusion of PT session chair alarm engaged, all needs in  trang, RN notified of session findings/recommendations, and pt returned back in recliner chair with phone and call bell within reach. Pt denies any further questions at this time. The patient's AM-[facility] Basic Mobility Inpatient Short Form Raw Score is 10. A Raw score of less than or equal to 16 suggests the patient may benefit from discharge to post-acute rehabilitation services. Please also refer to the recommendation of the Physical Therapist for safe discharge planning. Recommend Level II upon hospital D/C, may need HLOC pending further progress.   Barriers to Discharge Decreased caregiver support;Inaccessible home environment   Goals   Patient Goals to get up to chair   STG Expiration Date 11/07/24   Short Term Goal #1 STG 1. Pt will be able to perform bed mobility tasks with Min A in order to improve overall functional mobility and assist in safe d/c. STG 2. Pt with sit EOB for at least 25 minutes at Sup level in order to strengthen abdominal musculature and assist in future transfers/ ambulation. STG 3. Pt will be able to perform functional transfer with Min A in order to improve overall functional mobility and assist in safe d/c. STG 4. Pt will improve sitting/standing static/dynamic balance 1/2 grade in order to improve functional mobility and assist in safe d/c. STG 5. Pt will improve LE strength by 1/2 grade in order to improve functional mobility and assist in safe d/c.   PT Treatment Day 0   Plan   Treatment/Interventions Functional transfer training;Therapeutic exercise;Bed mobility;Spoke to nursing;Spoke to case management;OT   PT Frequency 2-3x/wk  (pending further progress)   Discharge Recommendation   Rehab Resource Intensity Level, PT II (Moderate Resource Intensity)  (may need HLOC pending further progress)   -[facility] Basic Mobility Inpatient   Turning in Flat Bed Without Bedrails 2   Lying on Back to Sitting on Edge of Flat Bed Without Bedrails 2   Moving Bed to Chair 2   Standing Up From Chair Using  Arms 2   Walk in Room 1   Climb 3-5 Stairs With Railing 1   Basic Mobility Inpatient Raw Score 10   Turning Head Towards Sound 3   Follow Simple Instructions 3   Low Function Basic Mobility Raw Score  16   Low Function Basic Mobility Standardized Score  25.72   [facility] Level Of Mobility   -[facility] Goal 4: Move to chair/commode   -HL Achieved 4: Move to chair/commode   Modified [place] Scale   Modified [place] Scale 4   End of Consult   Patient Position at End of Consult All needs within reach;Bed/Chair alarm activated;Bedside chair         Monik Lantigua, PT DPT

## 2024-10-24 NOTE — OCCUPATIONAL THERAPY NOTE
"    Occupational Therapy Evaluation     Patient Name: Alyssa Madrigal  Today's Date: 10/24/2024  Problem List  Principal Problem:    Osteomyelitis (HCC)  Active Problems:    Controlled substance agreement broken    COPD (chronic obstructive pulmonary disease) with chronic bronchitis (HCC)    Major depressive disorder, recurrent episode with anxious distress (HCC)    Post laminectomy syndrome    Posttraumatic stress disorder    IVDU (intravenous drug user)    Fall    Palliative care by specialist    Tardive dyskinesia    Past Medical History  Past Medical History:   Diagnosis Date    Back pain     Drug use     Heroin abuse (HCC)     Neck pain      Past Surgical History  Past Surgical History:   Procedure Laterality Date    BACK SURGERY      fusion    FL GUIDED NEEDLE PLAC BX/ASP/INJ  7/23/2020    FL GUIDED NEEDLE PLAC BX/ASP/INJ  7/23/2020    FL GUIDED NEEDLE PLAC BX/ASP/INJ  11/3/2020    FL GUIDED NEEDLE PLAC BX/ASP/INJ  6/25/2021    HIP SURGERY Bilateral     NECK SURGERY      fusion             10/24/24 0928   OT Last Visit   OT Visit Date 10/24/24   Note Type   Note type Evaluation   Pain Assessment   Pain Assessment Tool 0-10   Pain Score 8   Pain Location/Orientation Location: Neck;Location: Head;Other (Comment)  (eyes)   Pain Onset/Description Onset: Ongoing   Effect of Pain on Daily Activities increased time for functional activities   Patient's Stated Pain Goal No pain   Hospital Pain Intervention(s) Repositioned;Ambulation/increased activity;Emotional support   Restrictions/Precautions   Weight Bearing Precautions Per Order No   Braces or Orthoses (S)  C/S Collar  (Per neuro sx note, \"VISTA collar ordered to help with pain control PRN. Recommend using if patient works with therapy for some aspect of protection. Ok to remove when in chair, bed, eating, and sleeping\".)   Other Precautions Contact/isolation;Cognitive;Chair Alarm;Bed Alarm;Multiple lines;Fall Risk;Pain   Home Living   Type of Home House   Home " "Layout One level;Performs ADLs on one level;Able to live on main level with bedroom/bathroom;Stairs to enter with rails  (14 BILL)   Home Equipment Wheelchair-manual  (Prior notes report pt is mostly w/c bound)   Additional Comments Pt currently admitted from hospice services, however, per CM, \"Pt expressed that she wants to \"get better and does not want to feel this way any longer\".  Pt states that she wants to go to rehab to get stronger and return to her \"normal\"\". Pt was living at home with her daughter, but reports not safe to return. CM working with state to determine guardianship.   Prior Function   Level of [place] Needs assistance with ADLs;Needs assistance with IADLS;Modified independent with wheelchair   Lives With Daughter  (was living with Dtr, not safe to return per chart)   Receives Help From Family   IADLs Family/Friend/Other provides transportation;Family/Friend/Other provides meals;Family/Friend/Other provides medication management   Comments Limited hx provided by pt. Hx and PLOF obtained from chart. Will continue to follow and assess   Lifestyle   Autonomy Assist PRN PTA with all ADLs/IADLs. Uses w/c mostly for functional mobility with SPT. (-).   Reciprocal Relationships Was living with Dtr, working with CM to determine guardianship   Service to Others Not currently working   Intrinsic Gratification Enjoys working with therapy   General   Additional Pertinent History Pt with complex hx. Information obtained from chart and CM include that pt was admitted from hospice care, however now reports wanting to work with therapy and \"get better\" in order to return to her normal life again. Per MD, pt no longer on hospice, but changed to level 3 care. Pt reports her plan is to go to rehab upon d/c. Pt with complex family history, limited support from family. CM working closely with pt.   Family/Caregiver Present No   Subjective   Subjective \"Woohoo! Therapy!\"   ADL   Where Assessed Edge of bed "   Eating Assistance 3  Moderate Assistance   Eating Deficit Other (Comment)  (2/2 significant neuropathy in her hands)   Grooming Assistance 4  Minimal Assistance   UB Bathing Assistance 3  Moderate Assistance   LB Bathing Assistance 2  Maximal Assistance   UB Dressing Assistance 3  Moderate Assistance   LB Dressing Assistance 2  Maximal Assistance   Toileting Assistance  2  Maximal Assistance   Functional Assistance 2  Maximal Assistance   Bed Mobility   Supine to Sit 3  Moderate assistance   Additional items Assist x 1;HOB elevated;Bedrails;Increased time required;Verbal cues;LE management   Sit to Supine Unable to assess   Additional Comments Pt in bed upon arrival. Assist to don c/s brace in bed. Able to get to EOB with Ax1. Remains seated EOB with min A 2/2 postural lean. Pt OOB in recliner post session, all needs met, call bell within reach. +chair alarm on   Transfers   Sit to Stand 2  Maximal assistance   Additional items Assist x 2;Increased time required;Verbal cues   Stand to Sit 2  Maximal assistance   Additional items Assist x 2;Increased time required;Verbal cues   Stand pivot 2  Maximal assistance   Additional items Assist x 2;Increased time required;Verbal cues   Additional Comments HHA + sacral support. Custom shoes worn for txfs   Balance   Static Sitting Fair -   Dynamic Sitting Poor +   Static Standing Poor   Dynamic Standing Poor -   Activity Tolerance   Activity Tolerance Patient limited by fatigue;Patient limited by pain   Medical Staff Made Aware Co-eval w PT 2/2 increased medical complexity and comorbidities.   Nurse Made Aware RN cleared for therapy   RUE Assessment   RUE Assessment X  (extensive UE neuropathy, unclear h/o deficits. Limited hand movements with 3/5 strength elbow-promimal.)   LUE Assessment   LUE Assessment X  (extensive UE neuropathy, unclear h/o deficits. Limited hand movements with 3/5 strength elbow-promimal.)   Hand Function   Gross Motor Coordination Impaired   Fine  Motor Coordination Impaired   Hand Function Comments Limited use of hands for functional activities 2/2 significant neuropathy. Pt reports onset recent, unclear h/o deficits.   Vision-Basic Assessment   Current Vision Wears glasses all the time   Cognition   Overall Cognitive Status Impaired   Arousal/Participation Alert;Cooperative   Attention Attends with cues to redirect   Orientation Level Oriented X4  (states date as October 14/15, but otherwise AxOx4)   Memory Decreased recall of precautions;Decreased recall of recent events   Following Commands Follows one step commands with increased time or repetition   Comments Pt very pleasant and cooperative. Overall oriented and alert on this date. Able to have full complex conversation with no difficulty, some VCs and repetition required. Some memory deficits and attention deficits noted. Pt seen by neuropsych in the past and was determined to not have medical capacity. Per MD note, mentation appears to have improved. MD Dr. Martinez reports that there is an order for new neuropysch eval with plans for pt to be re-seen to determine capacity.   Assessment   Limitation Decreased ADL status;Decreased Safe judgement during ADL;Decreased cognition;Decreased endurance;Decreased UE ROM;Decreased fine motor control;Decreased self-care trans;Decreased high-level ADLs   Prognosis Fair   Assessment 64 year old pt seen today for an OT evaluation following admission to [facility] 2/2 osteomyelitis with present symptoms significant for pain, fatigue, weakness, decreased ADL status, decreased functional mobility. Pt  has a past medical history of Back pain, Drug use, Heroin abuse (HCC), and Neck pain. Pt with complex hx and PLOF. Per chart and CM, pt recently in hospice care, presents here from [facility]. Pt was sent home on hospice from [facility] on 9/5/24. Pt lives alone, but her sister Jaycee is/was in the process of moving in with pt.  Jaycee assists pt at home  "but does not provide consistent care,\" and is currently no longer to communicate with pt. Since presenting here to [facility], \"Pt expressed that she wants to \"get better and does not want to feel this way any longer\". Pt states that she wants to go to rehab to get stronger and return to her \"normal\"\".  Pt was requiring assist for ADLs/IADLs, and was able to complete txfs to w/c. Per MD, pt no longer on hospice and appropriate to see for therapy, and neurosx cleared pt to work with therapy with c/s brace.  Pt very pleasant and cooperative t/o session. Pt completed functional bed mobility with mod Ax1. Max Ax2 for functional STS txfs with b/l HHA. Pt was mod A for UB ADLs and  max A for LB ADLs. The patient's raw score on the -PAC Daily Activity Inpatient Short Form is 12. A raw score of less than 19 suggests the patient may benefit from discharge to post-acute rehabilitation services. Please refer to the recommendation of the Occupational Therapist for safe discharge planning. Pt is functioning below baseline level of function and will continue to benefit from skilled acute OT to promote increased independence and return to PLOF. At this time, current OT recommendation is Level 2 resources vs higher level of care anticipated. Will continue to follow and assess.   Goals   Patient Goals to work with therapy   LT Time Frame 10-14   Long Term Goal #1 See below for goals   Plan   Treatment Interventions ADL retraining;Functional transfer training;UE strengthening/ROM;Endurance training;Patient/family training;Cognitive reorientation;Equipment evaluation/education;Fine motor coordination activities;Compensatory technique education;Continued evaluation;Energy conservation;Activityengagement   Goal Expiration Date 11/07/24   OT Frequency 1-2x/wk   Discharge Recommendation   Rehab Resource Intensity Level, OT II (Moderate Resource Intensity)  (Level 2 vs higher level of care may be required.)   AM-PAC Daily Activity Inpatient "   Lower Body Dressing 2   Bathing 2   Toileting 2   Upper Body Dressing 2   Grooming 2   Eating 2   Daily Activity Raw Score 12   Daily Activity Standardized Score (Calc for Raw Score >=11) 30.6   AM-PAC Applied Cognition Inpatient   Following a Speech/Presentation 2   Understanding Ordinary Conversation 3   Taking Medications 2   Remembering Where Things Are Placed or Put Away 2   Remembering List of 4-5 Errands 2   Taking Care of Complicated Tasks 2   Applied Cognition Raw Score 13   Applied Cognition Standardized Score 30.46   End of Consult   Education Provided Yes   Patient Position at End of Consult Bedside chair;Bed/Chair alarm activated;All needs within reach   Nurse Communication Nurse aware of consult         Occupational Therapy Goals    Pt will be SUP with bed mobility with good sitting balance/tolerance to engage in self care tasks within this plan of care.      Pt will be Min A for UB dressing and bathing with use of assistive devices PRN within this plan of care.     Pt will be Min A for LB dressing and bathing with use of assistive devices PRN within this plan of care.     Pt will demonstrate standing tolerance of 2-3 minutes increase independence for toileting ADLs/tasks with use of assistive devices PRN within this plan of care.     Pt will complete functional transfers with Min A, with use of appropriate assistive devices within this plan of care.     Pt will demonstrate good carryover of safety awareness with use of appropriate assistive devices during functional tasks within this plan of care.     Pt will demonstrated increased activity tolerance to 30 mins for participation in ADLs and functional tasks within this plan of care.     Pt will complete further functional cognitive assessment with good attention/participation to assist with safe d/c planning recommendations.       Richar Osman MS, OTR/L     [facility] CERTIFIED RATER ID [id number]

## 2024-10-24 NOTE — ASSESSMENT & PLAN NOTE
Goals: Maintain safe-environment in hospital, with proportional symptom management.  Patient sitting in chair today, tolerating well   Neuropsychology evaluated patient, and determined that she does not have capacity.  Incompetent persons cannot consent to hospice.  Patient has no willing or appropriate decision-makers:  Note: Case Management is presently assisting with arranging for guardianship.

## 2024-10-24 NOTE — PLAN OF CARE
Problem: PAIN - ADULT  Goal: Verbalizes/displays adequate comfort level or baseline comfort level  Description: Interventions:  - Encourage patient to monitor pain and request assistance  - Assess pain using appropriate pain scale  - Administer analgesics based on type and severity of pain and evaluate response  - Implement non-pharmacological measures as appropriate and evaluate response  - Consider cultural and social influences on pain and pain management  - Notify physician/advanced practitioner if interventions unsuccessful or patient reports new pain  10/24/2024 0315 by Amee Ackerman RN  Outcome: Progressing  10/24/2024 0315 by Amee Ackerman RN  Outcome: Progressing     Problem: INFECTION - ADULT  Goal: Absence or prevention of progression during hospitalization  Description: INTERVENTIONS:  - Assess and monitor for signs and symptoms of infection  - Monitor lab/diagnostic results  - Monitor all insertion sites, i.e. indwelling lines, tubes, and drains  - Monitor endotracheal if appropriate and nasal secretions for changes in amount and color  - [place] appropriate cooling/warming therapies per order  - Administer medications as ordered  - Instruct and encourage patient and family to use good hand hygiene technique  - Identify and instruct in appropriate isolation precautions for identified infection/condition  10/24/2024 0315 by Amee Ackerman RN  Outcome: Progressing  10/24/2024 0315 by Amee Ackerman RN  Outcome: Progressing     Problem: SAFETY ADULT  Goal: Patient will remain free of falls  Description: INTERVENTIONS:  - Educate patient/family on patient safety including physical limitations  - Instruct patient to call for assistance with activity   - Consult OT/PT to assist with strengthening/mobility   - Keep Call bell within reach  - Keep bed low and locked with side rails adjusted as appropriate  - Keep care items and personal belongings within reach  - Initiate and maintain  comfort rounds  - Make Fall Risk Sign visible to staff  - Offer Toileting every 2 Hours, in advance of need  - Initiate/Maintain bed alarm  - Obtain necessary fall risk management equipment: assistive devices  - Apply yellow socks and bracelet for high fall risk patients  - Consider moving patient to room near nurses station  10/24/2024 0315 by Amee Ackerman RN  Outcome: Progressing  10/24/2024 0315 by Amee Ackerman RN  Outcome: Progressing  Goal: Maintain or return to baseline ADL function  Description: INTERVENTIONS:  -  Assess patient's ability to carry out ADLs; assess patient's baseline for ADL function and identify physical deficits which impact ability to perform ADLs (bathing, care of mouth/teeth, toileting, grooming, dressing, etc.)  - Assess/evaluate cause of self-care deficits   - Assess range of motion  - Assess patient's mobility; develop plan if impaired  - Assess patient's need for assistive devices and provide as appropriate  - Encourage maximum independence but intervene and supervise when necessary  - Involve family in performance of ADLs  - Assess for home care needs following discharge   - Consider OT consult to assist with ADL evaluation and planning for discharge  - Provide patient education as appropriate  10/24/2024 0315 by Amee Ackerman RN  Outcome: Progressing  10/24/2024 0315 by Amee Ackerman RN  Outcome: Progressing  Goal: Maintains/Returns to pre admission functional level  Description: INTERVENTIONS:  - Perform AM-PAC 6 Click Basic Mobility/ Daily Activity assessment daily.  - Set and communicate daily mobility goal to care team and patient/family/caregiver.   - Collaborate with rehabilitation services on mobility goals if consulted  - Out of bed for toileting  - Record patient progress and toleration of activity level   10/24/2024 0315 by Amee Ackerman RN  Outcome: Progressing  10/24/2024 0315 by Amee Ackerman RN  Outcome: Progressing     Problem:  DISCHARGE PLANNING  Goal: Discharge to home or other facility with appropriate resources  Description: INTERVENTIONS:  - Identify barriers to discharge w/patient and caregiver  - Arrange for needed discharge resources and transportation as appropriate  - Identify discharge learning needs (meds, wound care, etc.)  - Arrange for interpretive services to assist at discharge as needed  - Refer to Case Management Department for coordinating discharge planning if the patient needs post-hospital services based on physician/advanced practitioner order or complex needs related to functional status, cognitive ability, or social support system  10/24/2024 0315 by Amee Ackerman RN  Outcome: Progressing  10/24/2024 0315 by Amee Ackerman RN  Outcome: Progressing     Problem: Knowledge Deficit  Goal: Patient/family/caregiver demonstrates understanding of disease process, treatment plan, medications, and discharge instructions  Description: Complete learning assessment and assess knowledge base.  Interventions:  - Provide teaching at level of understanding  - Provide teaching via preferred learning methods  10/24/2024 0315 by Amee Ackerman RN  Outcome: Progressing  10/24/2024 0315 by Amee Ackerman RN  Outcome: Progressing     Problem: Prexisting or High Potential for Compromised Skin Integrity  Goal: Skin integrity is maintained or improved  Description: INTERVENTIONS:  - Identify patients at risk for skin breakdown  - Assess and monitor skin integrity  - Assess and monitor nutrition and hydration status  - Monitor labs   - Assess for incontinence   - Turn and reposition patient  - Assist with mobility/ambulation  - Relieve pressure over bony prominences  - Avoid friction and shearing  - Provide appropriate hygiene as needed including keeping skin clean and dry  - Evaluate need for skin moisturizer/barrier cream  - Collaborate with interdisciplinary team   - Patient/family teaching  - Consider wound care consult    10/24/2024 0315 by Amee Ackerman RN  Outcome: Progressing  10/24/2024 0315 by Amee Ackerman RN  Outcome: Progressing     Problem: Nutrition/Hydration-ADULT  Goal: Nutrient/Hydration intake appropriate for improving, restoring or maintaining nutritional needs  Description: Monitor and assess patient's nutrition/hydration status for malnutrition. Collaborate with interdisciplinary team and initiate plan and interventions as ordered.  Monitor patient's weight and dietary intake as ordered or per policy. Utilize nutrition screening tool and intervene as necessary. Determine patient's food preferences and provide high-protein, high-caloric foods as appropriate.     INTERVENTIONS:  - Monitor oral intake, urinary output, labs, and treatment plans  - Assess nutrition and hydration status and recommend course of action  - Evaluate amount of meals eaten  - Assist patient with eating if necessary   - Allow adequate time for meals  - Recommend/ encourage appropriate diets, oral nutritional supplements, and vitamin/mineral supplements  - Order, calculate, and assess calorie counts as needed  - Recommend, monitor, and adjust tube feedings and TPN/PPN based on assessed needs  - Assess need for intravenous fluids  - Provide specific nutrition/hydration education as appropriate  - Include patient/family/caregiver in decisions related to nutrition  10/24/2024 0315 by Amee Ackerman RN  Outcome: Progressing  10/24/2024 0315 by Amee Ackerman RN  Outcome: Progressing     Problem: SAFETY,RESTRAINT: NV/NON-SELF DESTRUCTIVE BEHAVIOR  Goal: Remains free of harm/injury (restraint for non violent/non self-detsructive behavior)  Description: INTERVENTIONS:  - Instruct patient/family regarding restraint use   - Assess and monitor physiologic and psychological status   - Provide interventions and comfort measures to meet assessed patient needs   - Identify and implement measures to help patient regain control  - Assess  readiness for release of restraint   10/24/2024 0315 by Amee Ackerman RN  Outcome: Progressing  10/24/2024 0315 by Amee Ackerman RN  Outcome: Progressing  Goal: Returns to optimal restraint-free functioning  Description: INTERVENTIONS:  - Assess the patient's behavior and symptoms that indicate continued need for restraint  - Identify and implement measures to help patient regain control  - Assess readiness for release of restraint   10/24/2024 0315 by Amee Ackerman RN  Outcome: Progressing  10/24/2024 0315 by Amee Ackerman RN  Outcome: Progressing     Problem: GENITOURINARY - ADULT  Goal: Urinary catheter remains patent  Description: INTERVENTIONS:  - Assess patency of urinary catheter  - If patient has a chronic bhatti, consider changing catheter if non-functioning  - Follow guidelines for intermittent irrigation of non-functioning urinary catheter  10/24/2024 0315 by Amee Ackerman RN  Outcome: Progressing  10/24/2024 0315 by Amee Ackerman RN  Outcome: Progressing     Problem: METABOLIC, FLUID AND ELECTROLYTES - ADULT  Goal: Electrolytes maintained within normal limits  Description: INTERVENTIONS:  - Monitor labs and assess patient for signs and symptoms of electrolyte imbalances  - Administer electrolyte replacement as ordered  - Monitor response to electrolyte replacements, including repeat lab results as appropriate  - Instruct patient on fluid and nutrition as appropriate  10/24/2024 0315 by Amee Ackerman RN  Outcome: Progressing  10/24/2024 0315 by Amee Ackerman RN  Outcome: Progressing  Goal: Fluid balance maintained  Description: INTERVENTIONS:  - Monitor labs   - Monitor I/O and WT  - Instruct patient on fluid and nutrition as appropriate  - Assess for signs & symptoms of volume excess or deficit  10/24/2024 0315 by Amee Ackerman RN  Outcome: Progressing  10/24/2024 0315 by Amee Ackerman RN  Outcome: Progressing     Problem: HEMATOLOGIC - ADULT  Goal:  Maintains hematologic stability  Description: INTERVENTIONS  - Assess for signs and symptoms of bleeding or hemorrhage  - Monitor labs  - Administer supportive blood products/factors as ordered and appropriate  10/24/2024 0315 by Amee Ackerman RN  Outcome: Progressing  10/24/2024 0315 by Amee Ackerman RN  Outcome: Progressing     Problem: MUSCULOSKELETAL - ADULT  Goal: Maintain or return mobility to safest level of function  Description: INTERVENTIONS:  - Assess patient's ability to carry out ADLs; assess patient's baseline for ADL function and identify physical deficits which impact ability to perform ADLs (bathing, care of mouth/teeth, toileting, grooming, dressing, etc.)  - Assess/evaluate cause of self-care deficits   - Assess range of motion  - Assess patient's mobility  - Assess patient's need for assistive devices and provide as appropriate  - Encourage maximum independence but intervene and supervise when necessary  - Involve family in performance of ADLs  - Assess for home care needs following discharge   - Consider OT consult to assist with ADL evaluation and planning for discharge  - Provide patient education as appropriate  10/24/2024 0315 by Amee Ackerman RN  Outcome: Progressing  10/24/2024 0315 by Amee Ackerman RN  Outcome: Progressing  Goal: Maintain proper alignment of affected body part  Description: INTERVENTIONS:  - Support, maintain and protect limb and body alignment  - Provide patient/ family with appropriate education  10/24/2024 0315 by Amee Ackerman RN  Outcome: Progressing  10/24/2024 0315 by Amee Ackerman, RN  Outcome: Progressing

## 2024-10-24 NOTE — ASSESSMENT & PLAN NOTE
Modified pain-regimen:  Continue Fentanyl Transdermal Patch at 50 mcg Q72HR.  Continue Gabapentin 250 mg TID (Scheduled).  Continue Acetaminophen 320 mg 4x daily (Scheduled).  Continue Methocarbamol 500 mg 4x daily (Scheduled).   Increased PRN pain regimen to Oxycodone 10 mg Q3H PRN for moderate pain and 15 mg Q3H PRN for severe pain   Likely has increased opioid tolerance due to history of IVDU.

## 2024-10-24 NOTE — PROGRESS NOTES
Progress Note - Hospitalist   Name: Alyssa Madrigal 64 y.o. female I MRN: [id number]  Unit/Bed#: [facility] 815-01 I Date of Admission: 10/8/2024   Date of Service: 10/24/2024 I Hospital Day: 16    Assessment & Plan  Osteomyelitis (HCC)  MRI in 7/2024  Infectious/inflammatory changes from the occiput to cervical spine as   discussed above. Likely improved fluid collection in the left posterior   paraspinal soft tissues at C1-2 level. Otherwise no significant change from   06/29/2023 including epidural soft tissue thickening and enhancement and fluid   collection along the right aspect of C1-C2 joint and prevertebral space.   Patient went to hospice and completed 3 months of doxycycline  Still complains of pain - palliative care following for assistance with pain management  Pt was discharged for [facility] hospice services due to suspected diversion of narcotics by her sister Jaycee.  Palliative care and hospice services are following  palliative care service following - changed methadone to fentanyl patch to assist with discharge planning to a facility  Seen by neuropsychology - pt does not have capacity  Discharge planning to SNF for long term care  Will need guardianship    Controlled substance agreement broken  Plan noted above  COPD (chronic obstructive pulmonary disease) with chronic bronchitis (HCC)  Continue albuterol  Major depressive disorder, recurrent episode with anxious distress (HCC)  Continue venlaflaxine  IVDU (intravenous drug user)  Noted in history  Fall  Had a fall out of bed on 10/11. CT scans and Xrs are without any new traumatic injury  Fall precautions  Continue analgesics    VTE Pharmacologic Prophylaxis: VTE Score: 3 Moderate Risk (Score 3-4) - Pharmacological DVT Prophylaxis Ordered: enoxaparin (Lovenox).    Mobility:   Basic Mobility Inpatient Raw Score: 10  JH-HLM Goal: 4: Move to chair/commode  JH-HLM Achieved: 4: Move to chair/commode  JH-HLM Goal achieved. Continue to encourage appropriate  mobility.    Patient Centered Rounds: I performed bedside rounds with nursing staff today.   Discussions with Specialists or Other Care Team Provider: CIPRIANO    Education and Discussions with Family / Patient: Patient declined call to .     Current Length of Stay: 16 day(s)  Current Patient Status: Inpatient   Certification Statement: The patient will continue to require additional inpatient hospital stay due to Safe D/C planning  Discharge Plan:  Pending safe D/C plan    Code Status: Level 3 - DNAR and DNI    Subjective   This is a very pleasant 64-year-old female was seen evaluated today at bedside.  Patient denies any acute complaints at this time.    Objective :  Temp:  [98.2 °F (36.8 °C)-98.6 °F (37 °C)] 98.2 °F (36.8 °C)  HR:  [86-92] 86  BP: ()/(61-90) 94/61  Resp:  [16-19] 16  SpO2:  [97 %-98 %] 97 %  O2 Device: None (Room air)    Body mass index is 20.13 kg/m².     Input and Output Summary (last 24 hours):     Intake/Output Summary (Last 24 hours) at 10/24/2024 1506  Last data filed at 10/24/2024 1200  Gross per 24 hour   Intake 660 ml   Output 400 ml   Net 260 ml       Physical Exam  Vitals reviewed.   Constitutional:       General: She is not in acute distress.     Appearance: She is not ill-appearing.   HENT:      Head: Normocephalic.   Eyes:      Conjunctiva/sclera: Conjunctivae normal.   Cardiovascular:      Rate and Rhythm: Normal rate and regular rhythm.   Pulmonary:      Effort: Pulmonary effort is normal.   Abdominal:      General: Abdomen is flat.      Palpations: Abdomen is soft.      Tenderness: There is no abdominal tenderness.   Skin:     General: Skin is warm and dry.   Neurological:      Mental Status: She is alert. She is disoriented.           Lines/Drains:  Lines/Drains/Airways       Active Status       Name Placement date Placement time Site Days    Urethral Catheter 10/08/24  --  --  16                  Urinary Catheter:  Goal for removal: Voiding trial when ambulation  improves                 Lab Results: I have reviewed the following results:   Results from last 7 days   Lab Units 10/24/24  0509   WBC Thousand/uL 5.50   HEMOGLOBIN g/dL 10.7*   HEMATOCRIT % 34.9   PLATELETS Thousands/uL 347   SEGS PCT % 47   LYMPHO PCT % 37   MONO PCT % 11   EOS PCT % 4     Results from last 7 days   Lab Units 10/24/24  0509   SODIUM mmol/L 140   POTASSIUM mmol/L 3.8   CHLORIDE mmol/L 105   CO2 mmol/L 28   BUN mg/dL 15   CREATININE mg/dL 0.64   ANION GAP mmol/L 7   CALCIUM mg/dL 8.7   ALBUMIN g/dL 3.1*   TOTAL BILIRUBIN mg/dL 0.17*   ALK PHOS U/L 104   ALT U/L 19   AST U/L 16   GLUCOSE RANDOM mg/dL 82         Results from last 7 days   Lab Units 10/19/24  2123   POC GLUCOSE mg/dl 136               Recent Cultures (last 7 days):         Imaging Results Review: No pertinent imaging studies reviewed.  Other Study Results Review: No additional pertinent studies reviewed.    Last 24 Hours Medication List:     Current Facility-Administered Medications:     acetaminophen (TYLENOL) oral suspension 320 mg, 4x Daily (with meals and at bedtime)    albuterol (PROVENTIL HFA,VENTOLIN HFA) inhaler 2 puff, Q4H PRN    buPROPion (WELLBUTRIN XL) 24 hr tablet 150 mg, Daily    clonazePAM (KlonoPIN) tablet 1 mg, TID    enoxaparin (LOVENOX) subcutaneous injection 40 mg, Daily    fentaNYL (DURAGESIC) 50 mcg/hr TD 72 hr patch 50 mcg, Q72H    gabapentin (NEURONTIN) oral solution 250 mg, TID    haloperidol (HALDOL) tablet 1 mg, BID PRN    loratadine (CLARITIN) tablet 10 mg, Daily    LORazepam (ATIVAN) injection 1 mg, Q6H PRN    LORazepam (ATIVAN) injection 2 mg, Q10 Min PRN    methocarbamol (ROBAXIN) tablet 500 mg, 4x Daily (with meals and at bedtime)    naloxone (NARCAN) intranasal 2 mg, Daily PRN    nicotine (NICODERM CQ) 14 mg/24hr TD 24 hr patch 14 mg, Daily    nystatin (MYCOSTATIN) cream, BID    ondansetron (ZOFRAN) injection 4 mg, Q6H PRN    oxyCODONE (ROXICODONE) immediate release tablet 10 mg, Q3H PRN    oxyCODONE  (ROXICODONE) IR tablet 5 mg, Q3H PRN    polyethylene glycol (MIRALAX) packet 17 g, Daily PRN    QUEtiapine (SEROquel XR) 24 hr tablet 150 mg, HS    senna (SENOKOT) tablet 17.2 mg, BID    venlafaxine (EFFEXOR-XR) 24 hr capsule 225 mg, Daily    Administrative Statements   Today, Patient Was Seen By: Mao Martinez MD  I have spent a total time of 45 minutes in caring for this patient on the day of the visit/encounter including Diagnostic results, Prognosis, Patient and family education, and Communicating with other healthcare professionals .    **Please Note: This note may have been constructed using a voice recognition system.**

## 2024-10-24 NOTE — PROGRESS NOTES
Progress Note - Palliative Care   Name: Alyssa Madrigal 64 y.o. female I MRN: [id number]  Unit/Bed#: [facility] 815-01 I Date of Admission: 10/8/2024   Date of Service: 10/24/2024 I Hospital Day: 16    Assessment & Plan  Osteomyelitis (HCC)  MRI Cervical Spine in July 2024: Infectious/inflammatory changes from the occiput to cervical spine, likely improved fluid collection in the left posterior paraspinal soft tissues at C1-2 level. Patient transitioned to hospice through [facility], however, was subsequently discharged due to suspected diversion of narcotics by her patient's sister (Jaycee).    Neuropsychology evaluated patient, and determined that she does not have capacity.  Note: Case Management is presently assisting with arranging for guardianship.  Discharge planning to Skilled-Nursing Facility for long-term care.  Major depressive disorder, recurrent episode with anxious distress (HCC)  Continue Bupropion 150 mg Daily (Anxious and Impulsive Behavior).  Continue Gabapentin 250 mg TID (Anxiety).  Continue Venlafaxine 225 mg Daily and Quetiapine 150 mg Bedtime.   Continue PRN regimen for anxiety, and agitation.  IVDU (intravenous drug user)  Patient will not be offered any controlled-substances on discharge, unless she is discharged to a skilled nursing facility.  Palliative care by specialist  Goals: Maintain safe-environment in hospital, with proportional symptom management.  Patient sitting in chair today, tolerating well   Neuropsychology evaluated patient, and determined that she does not have capacity.  Incompetent persons cannot consent to hospice.  Patient has no willing or appropriate decision-makers:  Note: Case Management is presently assisting with arranging for guardianship.  Post laminectomy syndrome  Modified pain-regimen:  Continue Fentanyl Transdermal Patch at 50 mcg Q72HR.  Continue Gabapentin 250 mg TID (Scheduled).  Continue Acetaminophen 320 mg 4x daily (Scheduled).  Continue  Methocarbamol 500 mg 4x daily (Scheduled).   Increased PRN pain regimen to Oxycodone 10 mg Q3H PRN for moderate pain and 15 mg Q3H PRN for severe pain   Likely has increased opioid tolerance due to history of IVDU.   Posttraumatic stress disorder  Likely to benefit from safe environment and multimodal psychotropic management.  Tardive dyskinesia  Complicates ability to swallow pills; will trial liquids where possible.  Controlled substance agreement broken  Patient was discharged from [facility] due to suspected narcotic-diversion:  Patient will not be offered any controlled-substances on discharge, unless she is discharged to a skilled nursing facility.        PDMP Review: I have reviewed the patient's controlled substance dispensing history in the Prescription Drug Monitoring Program in compliance with the [facility] regulations before prescribing any controlled substances.    Subjective   Patient seen and examined while sitting in the chair. She is awake, alert, and able to correctly state her name, , current location and date. She is still confused about her diagnosis/prognosis. She states she is now okay with going to a facility, but is requesting that it be close as she wants her family to be able to visit her. Currently reporting 7/10 pain and stating she did not sleep well last night due to neck brace/collar. States she is still in pain and current regimen only mildly helping since stopping IV Dilaudid yesterday. Explained to patient that her nurse was right outside the room, preparing medications for administration. Mood labile during exam, patient became sad/tearful when discussing her pain after being told medications would be given in a few minutes.     Per nursing report, patient waxes/wanes during the day with her lucidity and orientation, sometimes gets agitated, but has been calm all morning.       MEDICATIONS / ALLERGIES:     all current active meds have been reviewed and  "current meds:   Current Facility-Administered Medications:     acetaminophen (TYLENOL) oral suspension 320 mg, 4x Daily (with meals and at bedtime)    albuterol (PROVENTIL HFA,VENTOLIN HFA) inhaler 2 puff, Q4H PRN    buPROPion (WELLBUTRIN XL) 24 hr tablet 150 mg, Daily    clonazePAM (KlonoPIN) tablet 1 mg, TID    enoxaparin (LOVENOX) subcutaneous injection 40 mg, Daily    fentaNYL (DURAGESIC) 50 mcg/hr TD 72 hr patch 50 mcg, Q72H    gabapentin (NEURONTIN) oral solution 250 mg, TID    haloperidol (HALDOL) tablet 1 mg, BID PRN    loratadine (CLARITIN) tablet 10 mg, Daily    LORazepam (ATIVAN) injection 1 mg, Q6H PRN    LORazepam (ATIVAN) injection 2 mg, Q10 Min PRN    methocarbamol (ROBAXIN) tablet 500 mg, 4x Daily (with meals and at bedtime)    naloxone (NARCAN) intranasal 2 mg, Daily PRN    nicotine (NICODERM CQ) 14 mg/24hr TD 24 hr patch 14 mg, Daily    nystatin (MYCOSTATIN) cream, BID    ondansetron (ZOFRAN) injection 4 mg, Q6H PRN    oxyCODONE (ROXICODONE) immediate release tablet 10 mg, Q3H PRN    oxyCODONE (ROXICODONE) IR tablet 5 mg, Q3H PRN    polyethylene glycol (MIRALAX) packet 17 g, Daily PRN    QUEtiapine (SEROquel XR) 24 hr tablet 150 mg, HS    senna (SENOKOT) tablet 17.2 mg, BID    venlafaxine (EFFEXOR-XR) 24 hr capsule 225 mg, Daily  Allergies   Allergen Reactions    Other      PT \"There is some other antibiotic that I took,its a really long name. I dont remember the name\"     Pollen Extract Other (See Comments)     Sinus headache.    Sulfa Antibiotics      Unknown reaction      Cefazolin Hives and Rash         Objective :  Temp:  [98.2 °F (36.8 °C)-99.1 °F (37.3 °C)] 98.2 °F (36.8 °C)  HR:  [] 86  BP: ()/(61-90) 94/61  Resp:  [16-19] 16  SpO2:  [97 %-98 %] 97 %  O2 Device: None (Room air)    Physical Exam  Constitutional:       General: She is not in acute distress.  HENT:      Head: Normocephalic.      Mouth/Throat:      Mouth: Mucous membranes are moist.   Eyes:      Extraocular " Movements: Extraocular movements intact.      Conjunctiva/sclera: Conjunctivae normal.   Neck:      Comments: In cervical collar  Cardiovascular:      Rate and Rhythm: Normal rate and regular rhythm.      Heart sounds: Normal heart sounds. No murmur heard.  Pulmonary:      Effort: Pulmonary effort is normal. No respiratory distress.      Breath sounds: Normal breath sounds.   Abdominal:      General: Bowel sounds are normal.      Palpations: Abdomen is soft.      Tenderness: There is no abdominal tenderness.   Musculoskeletal:      Right lower leg: No edema.      Left lower leg: No edema.   Skin:     General: Skin is warm.   Neurological:      Mental Status: She is alert.   Psychiatric:         Mood and Affect: Affect is labile and tearful.         Behavior: Behavior is cooperative.           Lab Results: I have reviewed the following results:  Lab Results   Component Value Date/Time    SODIUM 140 10/24/2024 05:09 AM    SODIUM 138 09/05/2024 10:44 AM    K 3.8 10/24/2024 05:09 AM    K 3.2 (L) 09/05/2024 10:44 AM    BUN 15 10/24/2024 05:09 AM    BUN 13 09/05/2024 10:44 AM    CREATININE 0.64 10/24/2024 05:09 AM    CREATININE 0.72 09/05/2024 10:44 AM    GLUC 82 10/24/2024 05:09 AM    GLUC 121 (H) 09/05/2024 10:44 AM    CALCIUM 8.7 10/24/2024 05:09 AM    CALCIUM 9.4 09/05/2024 10:44 AM    AST 16 10/24/2024 05:09 AM    AST 9 08/27/2024 03:14 AM    ALT 19 10/24/2024 05:09 AM    ALT 7 08/27/2024 03:14 AM    ALB 3.1 (L) 10/24/2024 05:09 AM    ALB 3.3 (L) 08/27/2024 03:14 AM    TP 5.4 (L) 10/24/2024 05:09 AM    TP 6.5 08/27/2024 03:14 AM    EGFR 95 10/24/2024 05:09 AM    EGFR 94 09/05/2024 10:44 AM    EGFR 100 12/28/2020 05:30 AM     Lab Results   Component Value Date/Time    HGB 10.7 (L) 10/24/2024 05:09 AM    HGB 9.1 (L) 11/06/2020 09:45 AM    WBC 5.50 10/24/2024 05:09 AM    WBC 6.19 03/25/2015 12:22 AM     10/24/2024 05:09 AM     03/25/2015 12:22 AM    INR 1.0 03/19/2023 09:43 AM     Lab Results   Component  Value Date/Time    [id number] 2.207 12/29/2014 06:13 AM       Code Status: Level 3 - DNAR and DNI  Advance Directive and Living Will:      Power of :    POLST:      Administrative Statements

## 2024-10-24 NOTE — PLAN OF CARE
Problem: PAIN - ADULT  Goal: Verbalizes/displays adequate comfort level or baseline comfort level  Description: Interventions:  - Encourage patient to monitor pain and request assistance  - Assess pain using appropriate pain scale  - Administer analgesics based on type and severity of pain and evaluate response  - Implement non-pharmacological measures as appropriate and evaluate response  - Consider cultural and social influences on pain and pain management  - Notify physician/advanced practitioner if interventions unsuccessful or patient reports new pain  Outcome: Progressing     Problem: INFECTION - ADULT  Goal: Absence or prevention of progression during hospitalization  Description: INTERVENTIONS:  - Assess and monitor for signs and symptoms of infection  - Monitor lab/diagnostic results  - Monitor all insertion sites, i.e. indwelling lines, tubes, and drains  - Monitor endotracheal if appropriate and nasal secretions for changes in amount and color  - [place] appropriate cooling/warming therapies per order  - Administer medications as ordered  - Instruct and encourage patient and family to use good hand hygiene technique  - Identify and instruct in appropriate isolation precautions for identified infection/condition  Outcome: Progressing     Problem: SAFETY ADULT  Goal: Patient will remain free of falls  Description: INTERVENTIONS:  - Educate patient/family on patient safety including physical limitations  - Instruct patient to call for assistance with activity   - Consult OT/PT to assist with strengthening/mobility   - Keep Call bell within reach  - Keep bed low and locked with side rails adjusted as appropriate  - Keep care items and personal belongings within reach  - Initiate and maintain comfort rounds  - Make Fall Risk Sign visible to staff  - Offer Toileting every 2 Hours, in advance of need  - Initiate/Maintain bed alarm  - Obtain necessary fall risk management equipment: assistive devices  - Apply  yellow socks and bracelet for high fall risk patients  - Consider moving patient to room near nurses station  Outcome: Progressing  Goal: Maintain or return to baseline ADL function  Description: INTERVENTIONS:  -  Assess patient's ability to carry out ADLs; assess patient's baseline for ADL function and identify physical deficits which impact ability to perform ADLs (bathing, care of mouth/teeth, toileting, grooming, dressing, etc.)  - Assess/evaluate cause of self-care deficits   - Assess range of motion  - Assess patient's mobility; develop plan if impaired  - Assess patient's need for assistive devices and provide as appropriate  - Encourage maximum independence but intervene and supervise when necessary  - Involve family in performance of ADLs  - Assess for home care needs following discharge   - Consider OT consult to assist with ADL evaluation and planning for discharge  - Provide patient education as appropriate  Outcome: Progressing  Goal: Maintains/Returns to pre admission functional level  Description: INTERVENTIONS:  - Perform AM-PAC 6 Click Basic Mobility/ Daily Activity assessment daily.  - Set and communicate daily mobility goal to care team and patient/family/caregiver.   - Collaborate with rehabilitation services on mobility goals if consulted  - Out of bed for toileting  - Record patient progress and toleration of activity level   Outcome: Progressing     Problem: DISCHARGE PLANNING  Goal: Discharge to home or other facility with appropriate resources  Description: INTERVENTIONS:  - Identify barriers to discharge w/patient and caregiver  - Arrange for needed discharge resources and transportation as appropriate  - Identify discharge learning needs (meds, wound care, etc.)  - Arrange for interpretive services to assist at discharge as needed  - Refer to Case Management Department for coordinating discharge planning if the patient needs post-hospital services based on physician/advanced practitioner  order or complex needs related to functional status, cognitive ability, or social support system  Outcome: Progressing     Problem: Knowledge Deficit  Goal: Patient/family/caregiver demonstrates understanding of disease process, treatment plan, medications, and discharge instructions  Description: Complete learning assessment and assess knowledge base.  Interventions:  - Provide teaching at level of understanding  - Provide teaching via preferred learning methods  Outcome: Progressing     Problem: Prexisting or High Potential for Compromised Skin Integrity  Goal: Skin integrity is maintained or improved  Description: INTERVENTIONS:  - Identify patients at risk for skin breakdown  - Assess and monitor skin integrity  - Assess and monitor nutrition and hydration status  - Monitor labs   - Assess for incontinence   - Turn and reposition patient  - Assist with mobility/ambulation  - Relieve pressure over bony prominences  - Avoid friction and shearing  - Provide appropriate hygiene as needed including keeping skin clean and dry  - Evaluate need for skin moisturizer/barrier cream  - Collaborate with interdisciplinary team   - Patient/family teaching  - Consider wound care consult   Outcome: Progressing     Problem: Nutrition/Hydration-ADULT  Goal: Nutrient/Hydration intake appropriate for improving, restoring or maintaining nutritional needs  Description: Monitor and assess patient's nutrition/hydration status for malnutrition. Collaborate with interdisciplinary team and initiate plan and interventions as ordered.  Monitor patient's weight and dietary intake as ordered or per policy. Utilize nutrition screening tool and intervene as necessary. Determine patient's food preferences and provide high-protein, high-caloric foods as appropriate.     INTERVENTIONS:  - Monitor oral intake, urinary output, labs, and treatment plans  - Assess nutrition and hydration status and recommend course of action  - Evaluate amount of meals  eaten  - Assist patient with eating if necessary   - Allow adequate time for meals  - Recommend/ encourage appropriate diets, oral nutritional supplements, and vitamin/mineral supplements  - Order, calculate, and assess calorie counts as needed  - Recommend, monitor, and adjust tube feedings and TPN/PPN based on assessed needs  - Assess need for intravenous fluids  - Provide specific nutrition/hydration education as appropriate  - Include patient/family/caregiver in decisions related to nutrition  Outcome: Progressing     Problem: SAFETY,RESTRAINT: NV/NON-SELF DESTRUCTIVE BEHAVIOR  Goal: Remains free of harm/injury (restraint for non violent/non self-detsructive behavior)  Description: INTERVENTIONS:  - Instruct patient/family regarding restraint use   - Assess and monitor physiologic and psychological status   - Provide interventions and comfort measures to meet assessed patient needs   - Identify and implement measures to help patient regain control  - Assess readiness for release of restraint   Outcome: Progressing  Goal: Returns to optimal restraint-free functioning  Description: INTERVENTIONS:  - Assess the patient's behavior and symptoms that indicate continued need for restraint  - Identify and implement measures to help patient regain control  - Assess readiness for release of restraint   Outcome: Progressing     Problem: GENITOURINARY - ADULT  Goal: Urinary catheter remains patent  Description: INTERVENTIONS:  - Assess patency of urinary catheter  - If patient has a chronic bhatti, consider changing catheter if non-functioning  - Follow guidelines for intermittent irrigation of non-functioning urinary catheter  Outcome: Progressing     Problem: METABOLIC, FLUID AND ELECTROLYTES - ADULT  Goal: Electrolytes maintained within normal limits  Description: INTERVENTIONS:  - Monitor labs and assess patient for signs and symptoms of electrolyte imbalances  - Administer electrolyte replacement as ordered  - Monitor  response to electrolyte replacements, including repeat lab results as appropriate  - Instruct patient on fluid and nutrition as appropriate  Outcome: Progressing  Goal: Fluid balance maintained  Description: INTERVENTIONS:  - Monitor labs   - Monitor I/O and WT  - Instruct patient on fluid and nutrition as appropriate  - Assess for signs & symptoms of volume excess or deficit  Outcome: Progressing     Problem: HEMATOLOGIC - ADULT  Goal: Maintains hematologic stability  Description: INTERVENTIONS  - Assess for signs and symptoms of bleeding or hemorrhage  - Monitor labs  - Administer supportive blood products/factors as ordered and appropriate  Outcome: Progressing     Problem: MUSCULOSKELETAL - ADULT  Goal: Maintain or return mobility to safest level of function  Description: INTERVENTIONS:  - Assess patient's ability to carry out ADLs; assess patient's baseline for ADL function and identify physical deficits which impact ability to perform ADLs (bathing, care of mouth/teeth, toileting, grooming, dressing, etc.)  - Assess/evaluate cause of self-care deficits   - Assess range of motion  - Assess patient's mobility  - Assess patient's need for assistive devices and provide as appropriate  - Encourage maximum independence but intervene and supervise when necessary  - Involve family in performance of ADLs  - Assess for home care needs following discharge   - Consider OT consult to assist with ADL evaluation and planning for discharge  - Provide patient education as appropriate  Outcome: Progressing  Goal: Maintain proper alignment of affected body part  Description: INTERVENTIONS:  - Support, maintain and protect limb and body alignment  - Provide patient/ family with appropriate education  Outcome: Progressing

## 2024-10-25 PROCEDURE — 99232 SBSQ HOSP IP/OBS MODERATE 35: CPT | Performed by: FAMILY MEDICINE

## 2024-10-25 RX ORDER — ALBUMIN (HUMAN) 12.5 G/50ML
25 SOLUTION INTRAVENOUS ONCE
Status: COMPLETED | OUTPATIENT
Start: 2024-10-25 | End: 2024-10-25

## 2024-10-25 RX ADMIN — CLONAZEPAM 1 MG: 1 TABLET ORAL at 21:15

## 2024-10-25 RX ADMIN — LORAZEPAM 1 MG: 2 INJECTION INTRAMUSCULAR; INTRAVENOUS at 13:24

## 2024-10-25 RX ADMIN — QUETIAPINE 150 MG: 150 TABLET, FILM COATED, EXTENDED RELEASE ORAL at 21:15

## 2024-10-25 RX ADMIN — ACETAMINOPHEN 320 MG: 650 SUSPENSION ORAL at 21:15

## 2024-10-25 RX ADMIN — CLONAZEPAM 1 MG: 1 TABLET ORAL at 08:56

## 2024-10-25 RX ADMIN — LORAZEPAM 1 MG: 2 INJECTION INTRAMUSCULAR; INTRAVENOUS at 21:15

## 2024-10-25 RX ADMIN — METHOCARBAMOL 500 MG: 500 TABLET ORAL at 21:15

## 2024-10-25 RX ADMIN — ENOXAPARIN SODIUM 40 MG: 40 INJECTION SUBCUTANEOUS at 09:04

## 2024-10-25 RX ADMIN — NYSTATIN: 100000 CREAM TOPICAL at 08:57

## 2024-10-25 RX ADMIN — OXYCODONE HYDROCHLORIDE 15 MG: 5 TABLET ORAL at 19:51

## 2024-10-25 RX ADMIN — BUPROPION HYDROCHLORIDE 150 MG: 150 TABLET, EXTENDED RELEASE ORAL at 08:57

## 2024-10-25 RX ADMIN — NICOTINE 14 MG: 14 PATCH, EXTENDED RELEASE TRANSDERMAL at 09:07

## 2024-10-25 RX ADMIN — LORATADINE 10 MG: 10 TABLET ORAL at 08:57

## 2024-10-25 RX ADMIN — OXYCODONE HYDROCHLORIDE 15 MG: 5 TABLET ORAL at 13:24

## 2024-10-25 RX ADMIN — SENNOSIDES 17.2 MG: 8.6 TABLET, FILM COATED ORAL at 17:13

## 2024-10-25 RX ADMIN — ACETAMINOPHEN 320 MG: 650 SUSPENSION ORAL at 17:13

## 2024-10-25 RX ADMIN — SENNOSIDES 17.2 MG: 8.6 TABLET, FILM COATED ORAL at 08:57

## 2024-10-25 RX ADMIN — METHOCARBAMOL 500 MG: 500 TABLET ORAL at 17:13

## 2024-10-25 RX ADMIN — GABAPENTIN 250 MG: 250 SOLUTION ORAL at 17:13

## 2024-10-25 RX ADMIN — METHOCARBAMOL 500 MG: 500 TABLET ORAL at 08:57

## 2024-10-25 RX ADMIN — METHOCARBAMOL 500 MG: 500 TABLET ORAL at 12:05

## 2024-10-25 RX ADMIN — OXYCODONE HYDROCHLORIDE 15 MG: 5 TABLET ORAL at 08:56

## 2024-10-25 RX ADMIN — ACETAMINOPHEN 320 MG: 650 SUSPENSION ORAL at 12:05

## 2024-10-25 RX ADMIN — CLONAZEPAM 1 MG: 1 TABLET ORAL at 17:13

## 2024-10-25 RX ADMIN — GABAPENTIN 250 MG: 250 SOLUTION ORAL at 21:15

## 2024-10-25 RX ADMIN — ACETAMINOPHEN 320 MG: 650 SUSPENSION ORAL at 08:57

## 2024-10-25 RX ADMIN — ALBUMIN (HUMAN) 25 G: 0.25 INJECTION, SOLUTION INTRAVENOUS at 00:53

## 2024-10-25 NOTE — PLAN OF CARE
Problem: PAIN - ADULT  Goal: Verbalizes/displays adequate comfort level or baseline comfort level  Description: Interventions:  - Encourage patient to monitor pain and request assistance  - Assess pain using appropriate pain scale  - Administer analgesics based on type and severity of pain and evaluate response  - Implement non-pharmacological measures as appropriate and evaluate response  - Consider cultural and social influences on pain and pain management  - Notify physician/advanced practitioner if interventions unsuccessful or patient reports new pain  Outcome: Progressing     Problem: INFECTION - ADULT  Goal: Absence or prevention of progression during hospitalization  Description: INTERVENTIONS:  - Assess and monitor for signs and symptoms of infection  - Monitor lab/diagnostic results  - Monitor all insertion sites, i.e. indwelling lines, tubes, and drains  - Monitor endotracheal if appropriate and nasal secretions for changes in amount and color  - [place] appropriate cooling/warming therapies per order  - Administer medications as ordered  - Instruct and encourage patient and family to use good hand hygiene technique  - Identify and instruct in appropriate isolation precautions for identified infection/condition  Outcome: Progressing

## 2024-10-25 NOTE — PROGRESS NOTES
Pastoral Care Progress Note             10/25/24 1400   Clinical Encounter Type   Visited With Patient   Routine Visit Follow-up   Pentecostalism Encounters   Pentecostalism Needs Prayer      follow-up with pt found pt stating that she was not doing ok. Pt reported feeling hurt by staff interactions and the loss of control in her discharge plan due to her inability to care for herself.  supported pt though listening and prayer and will continue to check in with pt.

## 2024-10-25 NOTE — CASE MANAGEMENT
Case Management Progress Note    Patient name Alyssa Madrigal  Location [facility] 815/[facility] 815-01 MRN [id number]  : 1960 Date 10/25/2024       LOS (days): 17  Geometric Mean LOS (GMLOS) (days): 4.2  Days to GMLOS:-12.5        OBJECTIVE:        Current admission status: Inpatient  Preferred Pharmacy:   [facility] [address] [place] [address], [facility] [address] [place] [address]  [address] [address]  Phone: [phone] Fax: [phone]    [facility] [place] [place] [facility] [address]  [address] [facility] [address]  Phone: [phone] Fax: [phone]    Primary Care Provider: Edenilson Thomas DO    Primary Insurance: [employer]UM[employer]  REP  Secondary Insurance: [facility]    PROGRESS NOTE:    CM sent guardianship paperwork to [employer] [place] via SHANDRA dean.

## 2024-10-25 NOTE — PROGRESS NOTES
Progress Note - Hospitalist   Name: Alyssa Madrigal 64 y.o. female I MRN: [id number]  Unit/Bed#: [facility] 815-01 I Date of Admission: 10/8/2024   Date of Service: 10/25/2024 I Hospital Day: 17    Assessment & Plan  Osteomyelitis (HCC)  MRI in 7/2024  Infectious/inflammatory changes from the occiput to cervical spine as   discussed above. Likely improved fluid collection in the left posterior   paraspinal soft tissues at C1-2 level. Otherwise no significant change from   06/29/2023 including epidural soft tissue thickening and enhancement and fluid   collection along the right aspect of C1-C2 joint and prevertebral space.   Patient went to hospice and completed 3 months of doxycycline  Still complains of pain - palliative care following for assistance with pain management  Pt was discharged for [facility] hospice services due to suspected diversion of narcotics by her sister Jaycee.  Palliative care and hospice services are following  palliative care service following - changed methadone to fentanyl patch to assist with discharge planning to a facility  Seen by neuropsychology - pt does not have capacity  Discharge planning to SNF for long term care  Will need guardianship    Controlled substance agreement broken  Plan noted above  COPD (chronic obstructive pulmonary disease) with chronic bronchitis (HCC)  Continue albuterol  Major depressive disorder, recurrent episode with anxious distress (HCC)  Continue venlaflaxine  IVDU (intravenous drug user)  Noted in history  Fall  Had a fall out of bed on 10/11. CT scans and Xrs are without any new traumatic injury  Fall precautions  Continue analgesics    VTE Pharmacologic Prophylaxis: VTE Score: 3 Moderate Risk (Score 3-4) - Pharmacological DVT Prophylaxis Ordered: enoxaparin (Lovenox).    Mobility:   Basic Mobility Inpatient Raw Score: 10  JH-HLM Goal: 4: Move to chair/commode  JH-HLM Achieved: 4: Move to chair/commode  JH-HLM Goal achieved. Continue to encourage appropriate  mobility.    Patient Centered Rounds: I performed bedside rounds with nursing staff today.   Discussions with Specialists or Other Care Team Provider: CM    Education and Discussions with Family / Patient: Patient declined call to .     Current Length of Stay: 17 day(s)  Current Patient Status: Inpatient   Certification Statement: The patient will continue to require additional inpatient hospital stay due to Safe D/C plan  Discharge Plan:  Pending Safe D/C    Code Status: Level 3 - DNAR and DNI    Subjective   This is a very pleasant 64-year-old female who was seen and evaluated today at bedside.  Patient denies any acute complaints at this time.    Objective :  Temp:  [98.6 °F (37 °C)-99.8 °F (37.7 °C)] 99.8 °F (37.7 °C)  HR:  [] 109  BP: ()/(48-88) 124/88  Resp:  [16-17] 16  SpO2:  [95 %-99 %] 99 %  O2 Device: None (Room air)    Body mass index is 20.93 kg/m².     Input and Output Summary (last 24 hours):     Intake/Output Summary (Last 24 hours) at 10/25/2024 1407  Last data filed at 10/25/2024 1159  Gross per 24 hour   Intake 660 ml   Output 575 ml   Net 85 ml       Physical Exam  Vitals reviewed.   Constitutional:       General: She is not in acute distress.     Appearance: She is not ill-appearing.   HENT:      Head: Normocephalic.   Eyes:      Conjunctiva/sclera: Conjunctivae normal.   Cardiovascular:      Rate and Rhythm: Normal rate and regular rhythm.   Pulmonary:      Effort: Pulmonary effort is normal.   Abdominal:      General: Abdomen is flat.      Palpations: Abdomen is soft.      Tenderness: There is no abdominal tenderness.   Skin:     General: Skin is warm and dry.   Neurological:      Mental Status: She is alert. She is disoriented.           Lines/Drains:              Lab Results: I have reviewed the following results:   Results from last 7 days   Lab Units 10/24/24  0509   WBC Thousand/uL 5.50   HEMOGLOBIN g/dL 10.7*   HEMATOCRIT % 34.9   PLATELETS Thousands/uL 347    SEGS PCT % 47   LYMPHO PCT % 37   MONO PCT % 11   EOS PCT % 4     Results from last 7 days   Lab Units 10/24/24  0509   SODIUM mmol/L 140   POTASSIUM mmol/L 3.8   CHLORIDE mmol/L 105   CO2 mmol/L 28   BUN mg/dL 15   CREATININE mg/dL 0.64   ANION GAP mmol/L 7   CALCIUM mg/dL 8.7   ALBUMIN g/dL 3.1*   TOTAL BILIRUBIN mg/dL 0.17*   ALK PHOS U/L 104   ALT U/L 19   AST U/L 16   GLUCOSE RANDOM mg/dL 82         Results from last 7 days   Lab Units 10/19/24  2123   POC GLUCOSE mg/dl 136               Recent Cultures (last 7 days):         Imaging Results Review: No pertinent imaging studies reviewed.  Other Study Results Review: No additional pertinent studies reviewed.    Last 24 Hours Medication List:     Current Facility-Administered Medications:     acetaminophen (TYLENOL) oral suspension 320 mg, 4x Daily (with meals and at bedtime)    albuterol (PROVENTIL HFA,VENTOLIN HFA) inhaler 2 puff, Q4H PRN    buPROPion (WELLBUTRIN XL) 24 hr tablet 150 mg, Daily    clonazePAM (KlonoPIN) tablet 1 mg, TID    enoxaparin (LOVENOX) subcutaneous injection 40 mg, Daily    fentaNYL (DURAGESIC) 50 mcg/hr TD 72 hr patch 50 mcg, Q72H    gabapentin (NEURONTIN) oral solution 250 mg, TID    haloperidol (HALDOL) tablet 1 mg, BID PRN    lidocaine (LIDODERM) 5 % patch 1 patch, Daily    loratadine (CLARITIN) tablet 10 mg, Daily    LORazepam (ATIVAN) injection 1 mg, Q6H PRN    LORazepam (ATIVAN) injection 2 mg, Q10 Min PRN    methocarbamol (ROBAXIN) tablet 500 mg, 4x Daily (with meals and at bedtime)    naloxone (NARCAN) intranasal 2 mg, Daily PRN    nicotine (NICODERM CQ) 14 mg/24hr TD 24 hr patch 14 mg, Daily    nystatin (MYCOSTATIN) cream, BID    ondansetron (ZOFRAN) injection 4 mg, Q6H PRN    oxyCODONE (ROXICODONE) immediate release tablet 10 mg, Q3H PRN    oxyCODONE (ROXICODONE) IR tablet 15 mg, Q3H PRN    polyethylene glycol (MIRALAX) packet 17 g, Daily PRN    QUEtiapine (SEROquel XR) 24 hr tablet 150 mg, HS    senna (SENOKOT) tablet 17.2  mg, BID    venlafaxine (EFFEXOR-XR) 24 hr capsule 225 mg, Daily    Administrative Statements   Today, Patient Was Seen By: Mao Martinez MD  I have spent a total time of 40 minutes in caring for this patient on the day of the visit/encounter including Diagnostic results, Prognosis, Patient and family education, Counseling / Coordination of care, Obtaining or reviewing history  , and Communicating with other healthcare professionals .    **Please Note: This note may have been constructed using a voice recognition system.**

## 2024-10-25 NOTE — CERTIFIED RECOVERY SPECIALIST
"   Certified  Note    Patient name: Alyssa Madrigal  Location: [facility] 815/[facility] 815-01  [place]  Attending:  Mao Martinez MD MRN [id number]  : 1960  Age: 64 y.o.    Sex: female Date 10/25/2024         Substance Use History:     Social History     Substance and Sexual Activity   Alcohol Use Not Currently        Social History     Substance and Sexual Activity   Drug Use Not Currently    Types: Heroin, Marijuana    Comment: heroin: 20  marijuana: 20      Time spent 15 mins   CRS and patient engaged in conversation that support and encourages healthy lifestyle. Patient understands but is visibly upset with sister currently and tearful at times.     CRS redirected patient and discussed positive interactions in hospital matt with her CM. Patient appeared to be less upset.     Patient made telephone call to her mom and CRS explained that \"Faviola/ADRIANNE\" will be in next week to meet with her for encouragement and support.          Carina Low       "

## 2024-10-25 NOTE — PLAN OF CARE
Problem: PAIN - ADULT  Goal: Verbalizes/displays adequate comfort level or baseline comfort level  Description: Interventions:  - Encourage patient to monitor pain and request assistance  - Assess pain using appropriate pain scale  - Administer analgesics based on type and severity of pain and evaluate response  - Implement non-pharmacological measures as appropriate and evaluate response  - Consider cultural and social influences on pain and pain management  - Notify physician/advanced practitioner if interventions unsuccessful or patient reports new pain  Outcome: Progressing     Problem: INFECTION - ADULT  Goal: Absence or prevention of progression during hospitalization  Description: INTERVENTIONS:  - Assess and monitor for signs and symptoms of infection  - Monitor lab/diagnostic results  - Monitor all insertion sites, i.e. indwelling lines, tubes, and drains  - Monitor endotracheal if appropriate and nasal secretions for changes in amount and color  - [place] appropriate cooling/warming therapies per order  - Administer medications as ordered  - Instruct and encourage patient and family to use good hand hygiene technique  - Identify and instruct in appropriate isolation precautions for identified infection/condition  Outcome: Progressing     Problem: SAFETY ADULT  Goal: Patient will remain free of falls  Description: INTERVENTIONS:  - Educate patient/family on patient safety including physical limitations  - Instruct patient to call for assistance with activity   - Consult OT/PT to assist with strengthening/mobility   - Keep Call bell within reach  - Keep bed low and locked with side rails adjusted as appropriate  - Keep care items and personal belongings within reach  - Initiate and maintain comfort rounds  - Make Fall Risk Sign visible to staff  - Offer Toileting every 2 Hours, in advance of need  - Initiate/Maintain bed alarm  - Obtain necessary fall risk management equipment: assistive devices  - Apply  yellow socks and bracelet for high fall risk patients  - Consider moving patient to room near nurses station  Outcome: Progressing  Goal: Maintain or return to baseline ADL function  Description: INTERVENTIONS:  -  Assess patient's ability to carry out ADLs; assess patient's baseline for ADL function and identify physical deficits which impact ability to perform ADLs (bathing, care of mouth/teeth, toileting, grooming, dressing, etc.)  - Assess/evaluate cause of self-care deficits   - Assess range of motion  - Assess patient's mobility; develop plan if impaired  - Assess patient's need for assistive devices and provide as appropriate  - Encourage maximum independence but intervene and supervise when necessary  - Involve family in performance of ADLs  - Assess for home care needs following discharge   - Consider OT consult to assist with ADL evaluation and planning for discharge  - Provide patient education as appropriate  Outcome: Progressing  Goal: Maintains/Returns to pre admission functional level  Description: INTERVENTIONS:  - Perform AM-PAC 6 Click Basic Mobility/ Daily Activity assessment daily.  - Set and communicate daily mobility goal to care team and patient/family/caregiver.   - Collaborate with rehabilitation services on mobility goals if consulted  - Out of bed for toileting  - Record patient progress and toleration of activity level   Outcome: Progressing     Problem: DISCHARGE PLANNING  Goal: Discharge to home or other facility with appropriate resources  Description: INTERVENTIONS:  - Identify barriers to discharge w/patient and caregiver  - Arrange for needed discharge resources and transportation as appropriate  - Identify discharge learning needs (meds, wound care, etc.)  - Arrange for interpretive services to assist at discharge as needed  - Refer to Case Management Department for coordinating discharge planning if the patient needs post-hospital services based on physician/advanced practitioner  order or complex needs related to functional status, cognitive ability, or social support system  Outcome: Progressing     Problem: Knowledge Deficit  Goal: Patient/family/caregiver demonstrates understanding of disease process, treatment plan, medications, and discharge instructions  Description: Complete learning assessment and assess knowledge base.  Interventions:  - Provide teaching at level of understanding  - Provide teaching via preferred learning methods  Outcome: Progressing     Problem: Prexisting or High Potential for Compromised Skin Integrity  Goal: Skin integrity is maintained or improved  Description: INTERVENTIONS:  - Identify patients at risk for skin breakdown  - Assess and monitor skin integrity  - Assess and monitor nutrition and hydration status  - Monitor labs   - Assess for incontinence   - Turn and reposition patient  - Assist with mobility/ambulation  - Relieve pressure over bony prominences  - Avoid friction and shearing  - Provide appropriate hygiene as needed including keeping skin clean and dry  - Evaluate need for skin moisturizer/barrier cream  - Collaborate with interdisciplinary team   - Patient/family teaching  - Consider wound care consult   Outcome: Progressing     Problem: Nutrition/Hydration-ADULT  Goal: Nutrient/Hydration intake appropriate for improving, restoring or maintaining nutritional needs  Description: Monitor and assess patient's nutrition/hydration status for malnutrition. Collaborate with interdisciplinary team and initiate plan and interventions as ordered.  Monitor patient's weight and dietary intake as ordered or per policy. Utilize nutrition screening tool and intervene as necessary. Determine patient's food preferences and provide high-protein, high-caloric foods as appropriate.     INTERVENTIONS:  - Monitor oral intake, urinary output, labs, and treatment plans  - Assess nutrition and hydration status and recommend course of action  - Evaluate amount of meals  eaten  - Assist patient with eating if necessary   - Allow adequate time for meals  - Recommend/ encourage appropriate diets, oral nutritional supplements, and vitamin/mineral supplements  - Order, calculate, and assess calorie counts as needed  - Recommend, monitor, and adjust tube feedings and TPN/PPN based on assessed needs  - Assess need for intravenous fluids  - Provide specific nutrition/hydration education as appropriate  - Include patient/family/caregiver in decisions related to nutrition  Outcome: Progressing     Problem: SAFETY,RESTRAINT: NV/NON-SELF DESTRUCTIVE BEHAVIOR  Goal: Remains free of harm/injury (restraint for non violent/non self-detsructive behavior)  Description: INTERVENTIONS:  - Instruct patient/family regarding restraint use   - Assess and monitor physiologic and psychological status   - Provide interventions and comfort measures to meet assessed patient needs   - Identify and implement measures to help patient regain control  - Assess readiness for release of restraint   Outcome: Progressing  Goal: Returns to optimal restraint-free functioning  Description: INTERVENTIONS:  - Assess the patient's behavior and symptoms that indicate continued need for restraint  - Identify and implement measures to help patient regain control  - Assess readiness for release of restraint   Outcome: Progressing     Problem: GENITOURINARY - ADULT  Goal: Urinary catheter remains patent  Description: INTERVENTIONS:  - Assess patency of urinary catheter  - If patient has a chronic bhatti, consider changing catheter if non-functioning  - Follow guidelines for intermittent irrigation of non-functioning urinary catheter  Outcome: Progressing     Problem: METABOLIC, FLUID AND ELECTROLYTES - ADULT  Goal: Electrolytes maintained within normal limits  Description: INTERVENTIONS:  - Monitor labs and assess patient for signs and symptoms of electrolyte imbalances  - Administer electrolyte replacement as ordered  - Monitor  response to electrolyte replacements, including repeat lab results as appropriate  - Instruct patient on fluid and nutrition as appropriate  Outcome: Progressing  Goal: Fluid balance maintained  Description: INTERVENTIONS:  - Monitor labs   - Monitor I/O and WT  - Instruct patient on fluid and nutrition as appropriate  - Assess for signs & symptoms of volume excess or deficit  Outcome: Progressing     Problem: HEMATOLOGIC - ADULT  Goal: Maintains hematologic stability  Description: INTERVENTIONS  - Assess for signs and symptoms of bleeding or hemorrhage  - Monitor labs  - Administer supportive blood products/factors as ordered and appropriate  Outcome: Progressing     Problem: MUSCULOSKELETAL - ADULT  Goal: Maintain or return mobility to safest level of function  Description: INTERVENTIONS:  - Assess patient's ability to carry out ADLs; assess patient's baseline for ADL function and identify physical deficits which impact ability to perform ADLs (bathing, care of mouth/teeth, toileting, grooming, dressing, etc.)  - Assess/evaluate cause of self-care deficits   - Assess range of motion  - Assess patient's mobility  - Assess patient's need for assistive devices and provide as appropriate  - Encourage maximum independence but intervene and supervise when necessary  - Involve family in performance of ADLs  - Assess for home care needs following discharge   - Consider OT consult to assist with ADL evaluation and planning for discharge  - Provide patient education as appropriate  Outcome: Progressing  Goal: Maintain proper alignment of affected body part  Description: INTERVENTIONS:  - Support, maintain and protect limb and body alignment  - Provide patient/ family with appropriate education  Outcome: Progressing

## 2024-10-26 PROCEDURE — 99232 SBSQ HOSP IP/OBS MODERATE 35: CPT | Performed by: FAMILY MEDICINE

## 2024-10-26 RX ADMIN — ACETAMINOPHEN 320 MG: 650 SUSPENSION ORAL at 12:12

## 2024-10-26 RX ADMIN — METHOCARBAMOL 500 MG: 500 TABLET ORAL at 09:21

## 2024-10-26 RX ADMIN — GABAPENTIN 250 MG: 250 SOLUTION ORAL at 09:25

## 2024-10-26 RX ADMIN — CLONAZEPAM 1 MG: 1 TABLET ORAL at 17:06

## 2024-10-26 RX ADMIN — VENLAFAXINE HYDROCHLORIDE 225 MG: 150 CAPSULE, EXTENDED RELEASE ORAL at 09:36

## 2024-10-26 RX ADMIN — METHOCARBAMOL 500 MG: 500 TABLET ORAL at 12:12

## 2024-10-26 RX ADMIN — BUPROPION HYDROCHLORIDE 150 MG: 150 TABLET, EXTENDED RELEASE ORAL at 09:21

## 2024-10-26 RX ADMIN — OXYCODONE HYDROCHLORIDE 15 MG: 5 TABLET ORAL at 21:07

## 2024-10-26 RX ADMIN — OXYCODONE HYDROCHLORIDE 15 MG: 5 TABLET ORAL at 06:36

## 2024-10-26 RX ADMIN — LIDOCAINE 1 PATCH: 50 PATCH TOPICAL at 09:21

## 2024-10-26 RX ADMIN — ENOXAPARIN SODIUM 40 MG: 40 INJECTION SUBCUTANEOUS at 09:21

## 2024-10-26 RX ADMIN — METHOCARBAMOL 500 MG: 500 TABLET ORAL at 17:06

## 2024-10-26 RX ADMIN — NYSTATIN: 100000 CREAM TOPICAL at 09:36

## 2024-10-26 RX ADMIN — GABAPENTIN 250 MG: 250 SOLUTION ORAL at 17:07

## 2024-10-26 RX ADMIN — CLONAZEPAM 1 MG: 1 TABLET ORAL at 09:21

## 2024-10-26 RX ADMIN — ACETAMINOPHEN 320 MG: 650 SUSPENSION ORAL at 21:07

## 2024-10-26 RX ADMIN — FENTANYL 50 MCG: 50 PATCH, EXTENDED RELEASE TRANSDERMAL at 09:21

## 2024-10-26 RX ADMIN — LORATADINE 10 MG: 10 TABLET ORAL at 09:21

## 2024-10-26 RX ADMIN — NYSTATIN: 100000 CREAM TOPICAL at 17:07

## 2024-10-26 RX ADMIN — HALOPERIDOL 1 MG: 1 TABLET ORAL at 13:56

## 2024-10-26 RX ADMIN — GABAPENTIN 250 MG: 250 SOLUTION ORAL at 21:07

## 2024-10-26 RX ADMIN — ACETAMINOPHEN 320 MG: 650 SUSPENSION ORAL at 17:06

## 2024-10-26 RX ADMIN — OXYCODONE HYDROCHLORIDE 15 MG: 5 TABLET ORAL at 13:46

## 2024-10-26 RX ADMIN — NICOTINE 14 MG: 14 PATCH, EXTENDED RELEASE TRANSDERMAL at 09:21

## 2024-10-26 RX ADMIN — CLONAZEPAM 1 MG: 1 TABLET ORAL at 21:07

## 2024-10-26 RX ADMIN — SENNOSIDES 17.2 MG: 8.6 TABLET, FILM COATED ORAL at 09:21

## 2024-10-26 RX ADMIN — OXYCODONE HYDROCHLORIDE 15 MG: 5 TABLET ORAL at 17:06

## 2024-10-26 RX ADMIN — OXYCODONE HYDROCHLORIDE 15 MG: 5 TABLET ORAL at 09:21

## 2024-10-26 RX ADMIN — ACETAMINOPHEN 320 MG: 650 SUSPENSION ORAL at 09:21

## 2024-10-26 RX ADMIN — SENNOSIDES 17.2 MG: 8.6 TABLET, FILM COATED ORAL at 17:06

## 2024-10-26 RX ADMIN — LORAZEPAM 1 MG: 2 INJECTION INTRAMUSCULAR; INTRAVENOUS at 09:21

## 2024-10-26 RX ADMIN — QUETIAPINE 150 MG: 150 TABLET, FILM COATED, EXTENDED RELEASE ORAL at 21:08

## 2024-10-26 RX ADMIN — METHOCARBAMOL 500 MG: 500 TABLET ORAL at 21:07

## 2024-10-26 NOTE — PLAN OF CARE
Problem: PAIN - ADULT  Goal: Verbalizes/displays adequate comfort level or baseline comfort level  Description: Interventions:  - Encourage patient to monitor pain and request assistance  - Assess pain using appropriate pain scale  - Administer analgesics based on type and severity of pain and evaluate response  - Implement non-pharmacological measures as appropriate and evaluate response  - Consider cultural and social influences on pain and pain management  - Notify physician/advanced practitioner if interventions unsuccessful or patient reports new pain  Outcome: Progressing     Problem: INFECTION - ADULT  Goal: Absence or prevention of progression during hospitalization  Description: INTERVENTIONS:  - Assess and monitor for signs and symptoms of infection  - Monitor lab/diagnostic results  - Monitor all insertion sites, i.e. indwelling lines, tubes, and drains  - Monitor endotracheal if appropriate and nasal secretions for changes in amount and color  - [place] appropriate cooling/warming therapies per order  - Administer medications as ordered  - Instruct and encourage patient and family to use good hand hygiene technique  - Identify and instruct in appropriate isolation precautions for identified infection/condition  Outcome: Progressing     Problem: SAFETY ADULT  Goal: Patient will remain free of falls  Description: INTERVENTIONS:  - Educate patient/family on patient safety including physical limitations  - Instruct patient to call for assistance with activity   - Consult OT/PT to assist with strengthening/mobility   - Keep Call bell within reach  - Keep bed low and locked with side rails adjusted as appropriate  - Keep care items and personal belongings within reach  - Initiate and maintain comfort rounds  - Make Fall Risk Sign visible to staff  - Offer Toileting every 2 Hours, in advance of need  - Initiate/Maintain bed alarm  - Obtain necessary fall risk management equipment: assistive devices  - Apply  yellow socks and bracelet for high fall risk patients  - Consider moving patient to room near nurses station  Outcome: Progressing     Problem: DISCHARGE PLANNING  Goal: Discharge to home or other facility with appropriate resources  Description: INTERVENTIONS:  - Identify barriers to discharge w/patient and caregiver  - Arrange for needed discharge resources and transportation as appropriate  - Identify discharge learning needs (meds, wound care, etc.)  - Arrange for interpretive services to assist at discharge as needed  - Refer to Case Management Department for coordinating discharge planning if the patient needs post-hospital services based on physician/advanced practitioner order or complex needs related to functional status, cognitive ability, or social support system  Outcome: Progressing     Problem: Knowledge Deficit  Goal: Patient/family/caregiver demonstrates understanding of disease process, treatment plan, medications, and discharge instructions  Description: Complete learning assessment and assess knowledge base.  Interventions:  - Provide teaching at level of understanding  - Provide teaching via preferred learning methods  Outcome: Progressing     Problem: Nutrition/Hydration-ADULT  Goal: Nutrient/Hydration intake appropriate for improving, restoring or maintaining nutritional needs  Description: Monitor and assess patient's nutrition/hydration status for malnutrition. Collaborate with interdisciplinary team and initiate plan and interventions as ordered.  Monitor patient's weight and dietary intake as ordered or per policy. Utilize nutrition screening tool and intervene as necessary. Determine patient's food preferences and provide high-protein, high-caloric foods as appropriate.     INTERVENTIONS:  - Monitor oral intake, urinary output, labs, and treatment plans  - Assess nutrition and hydration status and recommend course of action  - Evaluate amount of meals eaten  - Assist patient with eating if  necessary   - Allow adequate time for meals  - Recommend/ encourage appropriate diets, oral nutritional supplements, and vitamin/mineral supplements  - Order, calculate, and assess calorie counts as needed  - Recommend, monitor, and adjust tube feedings and TPN/PPN based on assessed needs  - Assess need for intravenous fluids  - Provide specific nutrition/hydration education as appropriate  - Include patient/family/caregiver in decisions related to nutrition  Outcome: Progressing     Problem: GENITOURINARY - ADULT  Goal: Urinary catheter remains patent  Description: INTERVENTIONS:  - Assess patency of urinary catheter  - If patient has a chronic bhatti, consider changing catheter if non-functioning  - Follow guidelines for intermittent irrigation of non-functioning urinary catheter  Outcome: Progressing     Problem: METABOLIC, FLUID AND ELECTROLYTES - ADULT  Goal: Electrolytes maintained within normal limits  Description: INTERVENTIONS:  - Monitor labs and assess patient for signs and symptoms of electrolyte imbalances  - Administer electrolyte replacement as ordered  - Monitor response to electrolyte replacements, including repeat lab results as appropriate  - Instruct patient on fluid and nutrition as appropriate  Outcome: Progressing     Problem: METABOLIC, FLUID AND ELECTROLYTES - ADULT  Goal: Fluid balance maintained  Description: INTERVENTIONS:  - Monitor labs   - Monitor I/O and WT  - Instruct patient on fluid and nutrition as appropriate  - Assess for signs & symptoms of volume excess or deficit  Outcome: Progressing     Problem: HEMATOLOGIC - ADULT  Goal: Maintains hematologic stability  Description: INTERVENTIONS  - Assess for signs and symptoms of bleeding or hemorrhage  - Monitor labs  - Administer supportive blood products/factors as ordered and appropriate  Outcome: Progressing     Problem: MUSCULOSKELETAL - ADULT  Goal: Maintain or return mobility to safest level of function  Description:  INTERVENTIONS:  - Assess patient's ability to carry out ADLs; assess patient's baseline for ADL function and identify physical deficits which impact ability to perform ADLs (bathing, care of mouth/teeth, toileting, grooming, dressing, etc.)  - Assess/evaluate cause of self-care deficits   - Assess range of motion  - Assess patient's mobility  - Assess patient's need for assistive devices and provide as appropriate  - Encourage maximum independence but intervene and supervise when necessary  - Involve family in performance of ADLs  - Assess for home care needs following discharge   - Consider OT consult to assist with ADL evaluation and planning for discharge  - Provide patient education as appropriate  Outcome: Progressing     Problem: MUSCULOSKELETAL - ADULT  Goal: Maintain proper alignment of affected body part  Description: INTERVENTIONS:  - Support, maintain and protect limb and body alignment  - Provide patient/ family with appropriate education  Outcome: Progressing

## 2024-10-26 NOTE — PLAN OF CARE
Problem: PAIN - ADULT  Goal: Verbalizes/displays adequate comfort level or baseline comfort level  Description: Interventions:  - Encourage patient to monitor pain and request assistance  - Assess pain using appropriate pain scale  - Administer analgesics based on type and severity of pain and evaluate response  - Implement non-pharmacological measures as appropriate and evaluate response  - Consider cultural and social influences on pain and pain management  - Notify physician/advanced practitioner if interventions unsuccessful or patient reports new pain  Outcome: Progressing     Problem: INFECTION - ADULT  Goal: Absence or prevention of progression during hospitalization  Description: INTERVENTIONS:  - Assess and monitor for signs and symptoms of infection  - Monitor lab/diagnostic results  - Monitor all insertion sites, i.e. indwelling lines, tubes, and drains  - Monitor endotracheal if appropriate and nasal secretions for changes in amount and color  - [place] appropriate cooling/warming therapies per order  - Administer medications as ordered  - Instruct and encourage patient and family to use good hand hygiene technique  - Identify and instruct in appropriate isolation precautions for identified infection/condition  Outcome: Progressing     Problem: SAFETY ADULT  Goal: Patient will remain free of falls  Description: INTERVENTIONS:  - Educate patient/family on patient safety including physical limitations  - Instruct patient to call for assistance with activity   - Consult OT/PT to assist with strengthening/mobility   - Keep Call bell within reach  - Keep bed low and locked with side rails adjusted as appropriate  - Keep care items and personal belongings within reach  - Initiate and maintain comfort rounds  - Make Fall Risk Sign visible to staff  - Offer Toileting every 2 Hours, in advance of need  - Initiate/Maintain bed alarm  - Obtain necessary fall risk management equipment: assistive devices  - Apply  yellow socks and bracelet for high fall risk patients  - Consider moving patient to room near nurses station  Outcome: Progressing  Goal: Maintain or return to baseline ADL function  Description: INTERVENTIONS:  -  Assess patient's ability to carry out ADLs; assess patient's baseline for ADL function and identify physical deficits which impact ability to perform ADLs (bathing, care of mouth/teeth, toileting, grooming, dressing, etc.)  - Assess/evaluate cause of self-care deficits   - Assess range of motion  - Assess patient's mobility; develop plan if impaired  - Assess patient's need for assistive devices and provide as appropriate  - Encourage maximum independence but intervene and supervise when necessary  - Involve family in performance of ADLs  - Assess for home care needs following discharge   - Consider OT consult to assist with ADL evaluation and planning for discharge  - Provide patient education as appropriate  Outcome: Progressing  Goal: Maintains/Returns to pre admission functional level  Description: INTERVENTIONS:  - Perform AM-PAC 6 Click Basic Mobility/ Daily Activity assessment daily.  - Set and communicate daily mobility goal to care team and patient/family/caregiver.   - Collaborate with rehabilitation services on mobility goals if consulted  - Perform Range of Motion  times a day.  - Reposition patient every  hours.  - Dangle patient  times a day  - Stand patient  times a day  - Ambulate patient  times a day  - Out of bed to chair  times a day   - Out of bed for meals  times a day  - Out of bed for toileting  - Record patient progress and toleration of activity level   Outcome: Progressing     Problem: DISCHARGE PLANNING  Goal: Discharge to home or other facility with appropriate resources  Description: INTERVENTIONS:  - Identify barriers to discharge w/patient and caregiver  - Arrange for needed discharge resources and transportation as appropriate  - Identify discharge learning needs (meds,  wound care, etc.)  - Arrange for interpretive services to assist at discharge as needed  - Refer to Case Management Department for coordinating discharge planning if the patient needs post-hospital services based on physician/advanced practitioner order or complex needs related to functional status, cognitive ability, or social support system  Outcome: Progressing     Problem: Knowledge Deficit  Goal: Patient/family/caregiver demonstrates understanding of disease process, treatment plan, medications, and discharge instructions  Description: Complete learning assessment and assess knowledge base.  Interventions:  - Provide teaching at level of understanding  - Provide teaching via preferred learning methods  Outcome: Progressing     Problem: Prexisting or High Potential for Compromised Skin Integrity  Goal: Skin integrity is maintained or improved  Description: INTERVENTIONS:  - Identify patients at risk for skin breakdown  - Assess and monitor skin integrity  - Assess and monitor nutrition and hydration status  - Monitor labs   - Assess for incontinence   - Turn and reposition patient  - Assist with mobility/ambulation  - Relieve pressure over bony prominences  - Avoid friction and shearing  - Provide appropriate hygiene as needed including keeping skin clean and dry  - Evaluate need for skin moisturizer/barrier cream  - Collaborate with interdisciplinary team   - Patient/family teaching  - Consider wound care consult   Outcome: Progressing     Problem: Nutrition/Hydration-ADULT  Goal: Nutrient/Hydration intake appropriate for improving, restoring or maintaining nutritional needs  Description: Monitor and assess patient's nutrition/hydration status for malnutrition. Collaborate with interdisciplinary team and initiate plan and interventions as ordered.  Monitor patient's weight and dietary intake as ordered or per policy. Utilize nutrition screening tool and intervene as necessary. Determine patient's food  preferences and provide high-protein, high-caloric foods as appropriate.     INTERVENTIONS:  - Monitor oral intake, urinary output, labs, and treatment plans  - Assess nutrition and hydration status and recommend course of action  - Evaluate amount of meals eaten  - Assist patient with eating if necessary   - Allow adequate time for meals  - Recommend/ encourage appropriate diets, oral nutritional supplements, and vitamin/mineral supplements  - Order, calculate, and assess calorie counts as needed  - Recommend, monitor, and adjust tube feedings and TPN/PPN based on assessed needs  - Assess need for intravenous fluids  - Provide specific nutrition/hydration education as appropriate  - Include patient/family/caregiver in decisions related to nutrition  Outcome: Progressing       Problem: METABOLIC, FLUID AND ELECTROLYTES - ADULT  Goal: Electrolytes maintained within normal limits  Description: INTERVENTIONS:  - Monitor labs and assess patient for signs and symptoms of electrolyte imbalances  - Administer electrolyte replacement as ordered  - Monitor response to electrolyte replacements, including repeat lab results as appropriate  - Instruct patient on fluid and nutrition as appropriate  Outcome: Progressing  Goal: Fluid balance maintained  Description: INTERVENTIONS:  - Monitor labs   - Monitor I/O and WT  - Instruct patient on fluid and nutrition as appropriate  - Assess for signs & symptoms of volume excess or deficit  Outcome: Progressing     Problem: HEMATOLOGIC - ADULT  Goal: Maintains hematologic stability  Description: INTERVENTIONS  - Assess for signs and symptoms of bleeding or hemorrhage  - Monitor labs  - Administer supportive blood products/factors as ordered and appropriate  Outcome: Progressing     Problem: MUSCULOSKELETAL - ADULT  Goal: Maintain or return mobility to safest level of function  Description: INTERVENTIONS:  - Assess patient's ability to carry out ADLs; assess patient's baseline for ADL  function and identify physical deficits which impact ability to perform ADLs (bathing, care of mouth/teeth, toileting, grooming, dressing, etc.)  - Assess/evaluate cause of self-care deficits   - Assess range of motion  - Assess patient's mobility  - Assess patient's need for assistive devices and provide as appropriate  - Encourage maximum independence but intervene and supervise when necessary  - Involve family in performance of ADLs  - Assess for home care needs following discharge   - Consider OT consult to assist with ADL evaluation and planning for discharge  - Provide patient education as appropriate  Outcome: Progressing  Goal: Maintain proper alignment of affected body part  Description: INTERVENTIONS:  - Support, maintain and protect limb and body alignment  - Provide patient/ family with appropriate education  Outcome: Progressing

## 2024-10-26 NOTE — PROGRESS NOTES
Progress Note - Hospitalist   Name: Alyssa Madrigal 64 y.o. female I MRN: [id number]  Unit/Bed#: [facility] 815-01 I Date of Admission: 10/8/2024   Date of Service: 10/26/2024 I Hospital Day: 18    Assessment & Plan  Osteomyelitis (HCC)  MRI in 7/2024  Infectious/inflammatory changes from the occiput to cervical spine as   discussed above. Likely improved fluid collection in the left posterior   paraspinal soft tissues at C1-2 level. Otherwise no significant change from   06/29/2023 including epidural soft tissue thickening and enhancement and fluid   collection along the right aspect of C1-C2 joint and prevertebral space.   Patient went to hospice and completed 3 months of doxycycline  Still complains of pain - palliative care following for assistance with pain management  Pt was discharged for [facility] hospice services due to suspected diversion of narcotics by her sister Jaycee.  Palliative care and hospice services are following  palliative care service following - changed methadone to fentanyl patch to assist with discharge planning to a facility  Seen by neuropsychology - pt does not have capacity  Discharge planning to SNF for long term care  Will need guardianship    Controlled substance agreement broken  Plan noted above  COPD (chronic obstructive pulmonary disease) with chronic bronchitis (HCC)  Continue albuterol  Major depressive disorder, recurrent episode with anxious distress (HCC)  Continue venlaflaxine  IVDU (intravenous drug user)  Noted in history  Fall  Had a fall out of bed on 10/11. CT scans and Xrs are without any new traumatic injury  Fall precautions  Continue analgesics    VTE Pharmacologic Prophylaxis: VTE Score: 3 Moderate Risk (Score 3-4) - Pharmacological DVT Prophylaxis Ordered: enoxaparin (Lovenox).    Mobility:   Basic Mobility Inpatient Raw Score: 10  JH-HLM Goal: 4: Move to chair/commode  JH-HLM Achieved: 4: Move to chair/commode  JH-HLM Goal achieved. Continue to encourage appropriate  mobility.    Patient Centered Rounds: I performed bedside rounds with nursing staff today.   Discussions with Specialists or Other Care Team Provider: None    Education and Discussions with Family / Patient: Patient declined call to .     Current Length of Stay: 18 day(s)  Current Patient Status: Inpatient   Certification Statement: The patient will continue to require additional inpatient hospital stay due to Pending safe discharge plan  Discharge Plan:  Pending guardianship    Code Status: Level 3 - DNAR and DNI    Subjective   This is a pleasant 64-year-old female seen evaluated today at bedside.  Patient denies any acute complaints at this time.    Objective :  Temp:  [97.8 °F (36.6 °C)-98.5 °F (36.9 °C)] 98.5 °F (36.9 °C)  HR:  [] 94  BP: (103-120)/(68-79) 103/68  Resp:  [14-17] 14  SpO2:  [94 %-98 %] 94 %  O2 Device: None (Room air)    Body mass index is 20.93 kg/m².     Input and Output Summary (last 24 hours):     Intake/Output Summary (Last 24 hours) at 10/26/2024 1415  Last data filed at 10/26/2024 1212  Gross per 24 hour   Intake 520 ml   Output --   Net 520 ml       Physical Exam  Vitals reviewed.   Constitutional:       General: She is not in acute distress.     Appearance: She is not ill-appearing.   HENT:      Head: Normocephalic.   Eyes:      Conjunctiva/sclera: Conjunctivae normal.   Cardiovascular:      Rate and Rhythm: Normal rate and regular rhythm.   Pulmonary:      Effort: Pulmonary effort is normal.   Abdominal:      General: Abdomen is flat.      Palpations: Abdomen is soft.      Tenderness: There is no abdominal tenderness.   Skin:     General: Skin is warm and dry.   Neurological:      Mental Status: She is alert. She is disoriented.           Lines/Drains:              Lab Results: I have reviewed the following results:   Results from last 7 days   Lab Units 10/24/24  0509   WBC Thousand/uL 5.50   HEMOGLOBIN g/dL 10.7*   HEMATOCRIT % 34.9   PLATELETS Thousands/uL 347    SEGS PCT % 47   LYMPHO PCT % 37   MONO PCT % 11   EOS PCT % 4     Results from last 7 days   Lab Units 10/24/24  0509   SODIUM mmol/L 140   POTASSIUM mmol/L 3.8   CHLORIDE mmol/L 105   CO2 mmol/L 28   BUN mg/dL 15   CREATININE mg/dL 0.64   ANION GAP mmol/L 7   CALCIUM mg/dL 8.7   ALBUMIN g/dL 3.1*   TOTAL BILIRUBIN mg/dL 0.17*   ALK PHOS U/L 104   ALT U/L 19   AST U/L 16   GLUCOSE RANDOM mg/dL 82         Results from last 7 days   Lab Units 10/19/24  2123   POC GLUCOSE mg/dl 136               Recent Cultures (last 7 days):         Imaging Results Review: No pertinent imaging studies reviewed.  Other Study Results Review: No additional pertinent studies reviewed.    Last 24 Hours Medication List:     Current Facility-Administered Medications:     acetaminophen (TYLENOL) oral suspension 320 mg, 4x Daily (with meals and at bedtime)    albuterol (PROVENTIL HFA,VENTOLIN HFA) inhaler 2 puff, Q4H PRN    buPROPion (WELLBUTRIN XL) 24 hr tablet 150 mg, Daily    clonazePAM (KlonoPIN) tablet 1 mg, TID    enoxaparin (LOVENOX) subcutaneous injection 40 mg, Daily    fentaNYL (DURAGESIC) 50 mcg/hr TD 72 hr patch 50 mcg, Q72H    gabapentin (NEURONTIN) oral solution 250 mg, TID    haloperidol (HALDOL) tablet 1 mg, BID PRN    lidocaine (LIDODERM) 5 % patch 1 patch, Daily    loratadine (CLARITIN) tablet 10 mg, Daily    LORazepam (ATIVAN) injection 1 mg, Q6H PRN    LORazepam (ATIVAN) injection 2 mg, Q10 Min PRN    methocarbamol (ROBAXIN) tablet 500 mg, 4x Daily (with meals and at bedtime)    naloxone (NARCAN) intranasal 2 mg, Daily PRN    nicotine (NICODERM CQ) 14 mg/24hr TD 24 hr patch 14 mg, Daily    nystatin (MYCOSTATIN) cream, BID    ondansetron (ZOFRAN) injection 4 mg, Q6H PRN    oxyCODONE (ROXICODONE) immediate release tablet 10 mg, Q3H PRN    oxyCODONE (ROXICODONE) IR tablet 15 mg, Q3H PRN    polyethylene glycol (MIRALAX) packet 17 g, Daily PRN    QUEtiapine (SEROquel XR) 24 hr tablet 150 mg, HS    senna (SENOKOT) tablet 17.2  mg, BID    venlafaxine (EFFEXOR-XR) 24 hr capsule 225 mg, Daily    Administrative Statements   Today, Patient Was Seen By: Mao Martinez MD  I have spent a total time of 40 minutes in caring for this patient on the day of the visit/encounter including Diagnostic results, Prognosis, Counseling / Coordination of care, and Obtaining or reviewing history  .    **Please Note: This note may have been constructed using a voice recognition system.**

## 2024-10-27 LAB
1OH-MIDAZOLAM UR CFM-MCNC: NOT DETECTED NG/MG CREAT
6MAM UR QL SCN: NEGATIVE NG/ML
7AMINOCLONAZEPAM UR CFM-MCNC: 266 NG/MG CREAT
A-OH ALPRAZ UR QL CFM: NOT DETECTED NG/MG CREAT
ACCEPTABLE CREAT UR QL: 91 MG/DL
ALPRAZ/CREAT UR CFM: NOT DETECTED NG/MG CREAT
AMOBARBITAL UR QL CFM: NOT DETECTED
AMPHET UR QL SCN: NEGATIVE NG/ML
ANTICONVULSANTS: NORMAL
BARBITAL UR QL SCN: NOT DETECTED
BARBITURATES UR QL SCN: NORMAL NG/ML
BARBITURATES: ABNORMAL
BENZODIAZ UR QL SCN: NORMAL NG/ML
BENZODIAZEPINES: ABNORMAL
BUPRENORPHINE UR QL CFM: NORMAL NG/ML
BUPRENORPHINE: ABNORMAL
BUTABARBITAL UR QL CFM: NOT DETECTED
BUTALBITAL UR QL CFM: NOT DETECTED
CANNABINOIDS UR QL SCN: NEGATIVE NG/ML
CARISOPRODOL UR QL: NEGATIVE NG/ML
CLONAZEPAM/CREAT UR CFM: NOT DETECTED NG/MG CREAT
COCAINE+BZE UR QL SCN: NEGATIVE NG/ML
CODEINE UR QL CFM: NOT DETECTED NG/MG CREAT
DHC UR CFM-MCNC: NOT DETECTED NG/MG CREAT
DIAZEPAM/CREAT UR: NOT DETECTED NG/MG CREAT
ETHYL GLUCURONIDE UR QL SCN: NEGATIVE NG/ML
FENTANYL & ANALOGUES: ABNORMAL
FENTANYL UR QL CFM: 13 NG/MG CREAT
FENTANYL UR QL SCN: NORMAL NG/ML
FLUNITRAZEPAM UR-MCNC: NOT DETECTED NG/MG CREAT
GABAPENTIN SERPLBLD QL SCN: NORMAL UG/ML
GABAPENTIN UR QL CFM: PRESENT
HYDROCODONE UR QL CFM: NOT DETECTED NG/MG CREAT
HYDROMORPHONE UR QL CFM: 1262 NG/MG CREAT
LORAZEPAM UR CFM-MCNC: >2198 NG/MG CREAT
MEPHOBARBITAL UR QL CFM: NOT DETECTED
METHADONE UR QL SCN: NEGATIVE NG/ML
MIDAZOLAM/CREAT UR CFM: NOT DETECTED NG/MG CREAT
MORPHINE UR QL CFM: NOT DETECTED NG/MG CREAT
NALOXONE UR CFM-MCNC: NOT DETECTED NG/MG CREAT
NITRITE UR QL STRIP: NEGATIVE UG/ML
NORBUP/BUP RATIO: 0.74
NORBUPRENORPHINE UR CFM-MCNC: 59 NG/MG CREAT
NORBUPRENORPHINE/CREAT UR: 80 NG/MG CREAT
NORCODEINE/CREAT UR CFM: NOT DETECTED NG/MG CREAT
NORDIAZEPAM UR CFM-MCNC: NOT DETECTED NG/MG CREAT
NORFENTANYL UR QL CFM: 324 NG/MG CREAT
NORFLUNITRAZEPAM UR-MCNC: NOT DETECTED NG/MG CREAT
NORHYDROCODONE UR CFM-MCNC: NOT DETECTED NG/MG CREAT
NORMORPHINE: NOT DETECTED NG/MG CREAT
NOROXYCODONE UR CFM-MCNC: 7773 NG/MG CREAT
NOROXYMORPHONE/CREAT UR CFM: 152 NG/MG CREAT
OH-ETHYLFLURAZ UR CFM-MCNC: NOT DETECTED NG/MG CREAT
OH-TRIAZOLAM UR CFM-MCNC: NOT DETECTED NG/MG CREAT
OPIATE ANTAGONIST: NEGATIVE
OPIATE CLASS: ABNORMAL
OPIATES UR QL SCN: NORMAL NG/ML
OXAZEPAM CTO UR CFM-MCNC: NOT DETECTED NG/MG CREAT
OXYCODONE CLASS: ABNORMAL
OXYCODONE UR QL CFM: 260 NG/MG CREAT
OXYCODONE+OXYMORPHONE UR QL SCN: NORMAL NG/ML
OXYMORPHONE UR QL CFM: 286 NG/MG CREAT
PCP UR QL SCN: NEGATIVE NG/ML
PENTOBARB UR QL CFM: NOT DETECTED
PHENOBARB UR QL CFM: PRESENT
PREGABALIN UR QL CFM: NOT DETECTED
PROPOXYPH UR QL SCN: NEGATIVE NG/ML
SECOBARBITAL UR QL CFM: NOT DETECTED
SPECIMEN PH ACCEPTABLE UR: 7.1 (ref 4.5–8.9)
TAPENTADOL UR QL SCN: NEGATIVE NG/ML
TEMAZEPAM UR CFM-MCNC: NOT DETECTED NG/MG CREAT
THIOPENTAL UR QL CFM: NOT DETECTED
TRAMADOL UR QL SCN: NEGATIVE NG/ML

## 2024-10-27 PROCEDURE — 99232 SBSQ HOSP IP/OBS MODERATE 35: CPT | Performed by: FAMILY MEDICINE

## 2024-10-27 RX ADMIN — METHOCARBAMOL 500 MG: 500 TABLET ORAL at 11:20

## 2024-10-27 RX ADMIN — GABAPENTIN 250 MG: 250 SOLUTION ORAL at 21:02

## 2024-10-27 RX ADMIN — QUETIAPINE 150 MG: 150 TABLET, FILM COATED, EXTENDED RELEASE ORAL at 21:08

## 2024-10-27 RX ADMIN — HALOPERIDOL 1 MG: 1 TABLET ORAL at 07:57

## 2024-10-27 RX ADMIN — ACETAMINOPHEN 320 MG: 650 SUSPENSION ORAL at 11:20

## 2024-10-27 RX ADMIN — METHOCARBAMOL 500 MG: 500 TABLET ORAL at 16:21

## 2024-10-27 RX ADMIN — LORAZEPAM 1 MG: 2 INJECTION INTRAMUSCULAR; INTRAVENOUS at 17:47

## 2024-10-27 RX ADMIN — NICOTINE 14 MG: 14 PATCH, EXTENDED RELEASE TRANSDERMAL at 07:56

## 2024-10-27 RX ADMIN — ENOXAPARIN SODIUM 40 MG: 40 INJECTION SUBCUTANEOUS at 07:55

## 2024-10-27 RX ADMIN — CLONAZEPAM 1 MG: 1 TABLET ORAL at 15:12

## 2024-10-27 RX ADMIN — BUPROPION HYDROCHLORIDE 150 MG: 150 TABLET, EXTENDED RELEASE ORAL at 07:56

## 2024-10-27 RX ADMIN — VENLAFAXINE HYDROCHLORIDE 225 MG: 150 CAPSULE, EXTENDED RELEASE ORAL at 07:57

## 2024-10-27 RX ADMIN — OXYCODONE HYDROCHLORIDE 15 MG: 5 TABLET ORAL at 15:12

## 2024-10-27 RX ADMIN — OXYCODONE HYDROCHLORIDE 15 MG: 5 TABLET ORAL at 11:25

## 2024-10-27 RX ADMIN — OXYCODONE HYDROCHLORIDE 15 MG: 5 TABLET ORAL at 19:37

## 2024-10-27 RX ADMIN — SENNOSIDES 17.2 MG: 8.6 TABLET, FILM COATED ORAL at 17:47

## 2024-10-27 RX ADMIN — GABAPENTIN 250 MG: 250 SOLUTION ORAL at 15:18

## 2024-10-27 RX ADMIN — ACETAMINOPHEN 320 MG: 650 SUSPENSION ORAL at 21:02

## 2024-10-27 RX ADMIN — LORAZEPAM 1 MG: 2 INJECTION INTRAMUSCULAR; INTRAVENOUS at 06:30

## 2024-10-27 RX ADMIN — CLONAZEPAM 1 MG: 1 TABLET ORAL at 07:56

## 2024-10-27 RX ADMIN — METHOCARBAMOL 500 MG: 500 TABLET ORAL at 21:02

## 2024-10-27 RX ADMIN — ACETAMINOPHEN 320 MG: 650 SUSPENSION ORAL at 07:56

## 2024-10-27 RX ADMIN — ACETAMINOPHEN 320 MG: 650 SUSPENSION ORAL at 16:15

## 2024-10-27 RX ADMIN — CLONAZEPAM 1 MG: 1 TABLET ORAL at 20:50

## 2024-10-27 RX ADMIN — SENNOSIDES 17.2 MG: 8.6 TABLET, FILM COATED ORAL at 07:56

## 2024-10-27 RX ADMIN — GABAPENTIN 250 MG: 250 SOLUTION ORAL at 07:58

## 2024-10-27 RX ADMIN — NYSTATIN: 100000 CREAM TOPICAL at 17:48

## 2024-10-27 RX ADMIN — NYSTATIN: 100000 CREAM TOPICAL at 07:57

## 2024-10-27 RX ADMIN — LORATADINE 10 MG: 10 TABLET ORAL at 07:56

## 2024-10-27 RX ADMIN — METHOCARBAMOL 500 MG: 500 TABLET ORAL at 07:56

## 2024-10-27 RX ADMIN — OXYCODONE HYDROCHLORIDE 15 MG: 5 TABLET ORAL at 05:55

## 2024-10-27 NOTE — PROGRESS NOTES
Progress Note - Hospitalist   Name: Alyssa Madrigal 64 y.o. female I MRN: [id number]  [facility] I Date of Admission: 10/8/2024   Date of Service: 10/27/2024 I Hospital Day: 19    Assessment & Plan  Osteomyelitis (HCC)  MRI in 7/2024  Infectious/inflammatory changes from the occiput to cervical spine as   discussed above. Likely improved fluid collection in the left posterior   paraspinal soft tissues at C1-2 level. Otherwise no significant change from   06/29/2023 including epidural soft tissue thickening and enhancement and fluid   collection along the right aspect of C1-C2 joint and prevertebral space.   Patient went to hospice and completed 3 months of doxycycline  Still complains of pain - palliative care following for assistance with pain management  Pt was discharged for [facility] hospice services due to suspected diversion of narcotics by her sister Jaycee.  Palliative care and hospice services are following  palliative care service following - changed methadone to fentanyl patch to assist with discharge planning to a facility  Seen by neuropsychology - pt does not have capacity  Discharge planning to SNF for long term care  Will need guardianship  Pending safe D/C planning     Controlled substance agreement broken  Plan noted above  COPD (chronic obstructive pulmonary disease) with chronic bronchitis (HCC)  Continue albuterol  Major depressive disorder, recurrent episode with anxious distress (HCC)  Continue venlaflaxine  IVDU (intravenous drug user)  Noted in history  Fall  Had a fall out of bed on 10/11. CT scans and Xrs are without any new traumatic injury  Fall precautions  Continue analgesics    VTE Pharmacologic Prophylaxis: VTE Score: 3 Moderate Risk (Score 3-4) - Pharmacological DVT Prophylaxis Ordered: enoxaparin (Lovenox).    Mobility:   Basic Mobility Inpatient Raw Score: 14  -HLM Goal: 4: Move to chair/commode  JH-HLM Achieved: 1: Laying in bed  JH-HLM Goal NOT achieved. Continue with  multidisciplinary rounding and encourage appropriate mobility to improve upon [facility] goals.    Patient Centered Rounds: I performed bedside rounds with nursing staff today.   Discussions with Specialists or Other Care Team Provider: None    Education and Discussions with Family / Patient: Patient declined call to .     Current Length of Stay: 19 day(s)  Current Patient Status: Inpatient   Certification Statement: The patient will continue to require additional inpatient hospital stay due to Pending safe discharge   Discharge Plan:  Pending guardianship    Code Status: Level 3 - DNAR and DNI    Subjective   This is a very pleasant 64-year-old female who was seen and evaluated today at bedside.  Patient denies any acute complaints today.    Objective :  Temp:  [97.5 °F (36.4 °C)-100.2 °F (37.9 °C)] 97.5 °F (36.4 °C)  HR:  [] 93  BP: ()/(54-94) 133/94  Resp:  [14-18] 16  SpO2:  [98 %-99 %] 98 %  O2 Device: None (Room air)    Body mass index is 20.93 kg/m².     Input and Output Summary (last 24 hours):     Intake/Output Summary (Last 24 hours) at 10/27/2024 1343  Last data filed at 10/27/2024 1244  Gross per 24 hour   Intake 380 ml   Output 850 ml   Net -470 ml       Physical Exam  Vitals reviewed.   Constitutional:       General: She is not in acute distress.     Appearance: She is not ill-appearing.   HENT:      Head: Normocephalic.   Eyes:      Conjunctiva/sclera: Conjunctivae normal.   Cardiovascular:      Rate and Rhythm: Normal rate and regular rhythm.   Pulmonary:      Effort: Pulmonary effort is normal.   Abdominal:      General: Abdomen is flat.      Palpations: Abdomen is soft.      Tenderness: There is no abdominal tenderness.   Skin:     General: Skin is warm and dry.   Neurological:      Mental Status: She is alert. Mental status is at baseline.           Lines/Drains:              Lab Results: I have reviewed the following results:   Results from last 7 days   Lab Units  10/24/24  0509   WBC Thousand/uL 5.50   HEMOGLOBIN g/dL 10.7*   HEMATOCRIT % 34.9   PLATELETS Thousands/uL 347   SEGS PCT % 47   LYMPHO PCT % 37   MONO PCT % 11   EOS PCT % 4     Results from last 7 days   Lab Units 10/24/24  0509   SODIUM mmol/L 140   POTASSIUM mmol/L 3.8   CHLORIDE mmol/L 105   CO2 mmol/L 28   BUN mg/dL 15   CREATININE mg/dL 0.64   ANION GAP mmol/L 7   CALCIUM mg/dL 8.7   ALBUMIN g/dL 3.1*   TOTAL BILIRUBIN mg/dL 0.17*   ALK PHOS U/L 104   ALT U/L 19   AST U/L 16   GLUCOSE RANDOM mg/dL 82                       Recent Cultures (last 7 days):         Imaging Results Review: No pertinent imaging studies reviewed.  Other Study Results Review: No additional pertinent studies reviewed.    Last 24 Hours Medication List:     Current Facility-Administered Medications:     acetaminophen (TYLENOL) oral suspension 320 mg, 4x Daily (with meals and at bedtime)    albuterol (PROVENTIL HFA,VENTOLIN HFA) inhaler 2 puff, Q4H PRN    buPROPion (WELLBUTRIN XL) 24 hr tablet 150 mg, Daily    clonazePAM (KlonoPIN) tablet 1 mg, TID    enoxaparin (LOVENOX) subcutaneous injection 40 mg, Daily    fentaNYL (DURAGESIC) 50 mcg/hr TD 72 hr patch 50 mcg, Q72H    gabapentin (NEURONTIN) oral solution 250 mg, TID    haloperidol (HALDOL) tablet 1 mg, BID PRN    lidocaine (LIDODERM) 5 % patch 1 patch, Daily    loratadine (CLARITIN) tablet 10 mg, Daily    LORazepam (ATIVAN) injection 1 mg, Q6H PRN    LORazepam (ATIVAN) injection 2 mg, Q10 Min PRN    methocarbamol (ROBAXIN) tablet 500 mg, 4x Daily (with meals and at bedtime)    naloxone (NARCAN) intranasal 2 mg, Daily PRN    nicotine (NICODERM CQ) 14 mg/24hr TD 24 hr patch 14 mg, Daily    nystatin (MYCOSTATIN) cream, BID    ondansetron (ZOFRAN) injection 4 mg, Q6H PRN    oxyCODONE (ROXICODONE) immediate release tablet 10 mg, Q3H PRN    oxyCODONE (ROXICODONE) IR tablet 15 mg, Q3H PRN    polyethylene glycol (MIRALAX) packet 17 g, Daily PRN    QUEtiapine (SEROquel XR) 24 hr tablet 150 mg,  HS    senna (SENOKOT) tablet 17.2 mg, BID    venlafaxine (EFFEXOR-XR) 24 hr capsule 225 mg, Daily    Administrative Statements   Today, Patient Was Seen By: Mao Martinez MD  I have spent a total time of 40 minutes in caring for this patient on the day of the visit/encounter including Risks and benefits of tx options, Patient and family education, Counseling / Coordination of care, Obtaining or reviewing history  , and Communicating with other healthcare professionals .    **Please Note: This note may have been constructed using a voice recognition system.**

## 2024-10-27 NOTE — PLAN OF CARE
Problem: PAIN - ADULT  Goal: Verbalizes/displays adequate comfort level or baseline comfort level  Description: Interventions:  - Encourage patient to monitor pain and request assistance  - Assess pain using appropriate pain scale  - Administer analgesics based on type and severity of pain and evaluate response  - Implement non-pharmacological measures as appropriate and evaluate response  - Consider cultural and social influences on pain and pain management  - Notify physician/advanced practitioner if interventions unsuccessful or patient reports new pain  Outcome: Progressing     Problem: INFECTION - ADULT  Goal: Absence or prevention of progression during hospitalization  Description: INTERVENTIONS:  - Assess and monitor for signs and symptoms of infection  - Monitor lab/diagnostic results  - Monitor all insertion sites, i.e. indwelling lines, tubes, and drains  - Monitor endotracheal if appropriate and nasal secretions for changes in amount and color  - [place] appropriate cooling/warming therapies per order  - Administer medications as ordered  - Instruct and encourage patient and family to use good hand hygiene technique  - Identify and instruct in appropriate isolation precautions for identified infection/condition  Outcome: Progressing     Problem: SAFETY ADULT  Goal: Patient will remain free of falls  Description: INTERVENTIONS:  - Educate patient/family on patient safety including physical limitations  - Instruct patient to call for assistance with activity   - Consult OT/PT to assist with strengthening/mobility   - Keep Call bell within reach  - Keep bed low and locked with side rails adjusted as appropriate  - Keep care items and personal belongings within reach  - Initiate and maintain comfort rounds  - Make Fall Risk Sign visible to staff  - Offer Toileting every 2 Hours, in advance of need  - Initiate/Maintain bed alarm  - Obtain necessary fall risk management equipment: assistive devices  - Apply  yellow socks and bracelet for high fall risk patients  - Consider moving patient to room near nurses station  Outcome: Progressing  Goal: Maintain or return to baseline ADL function  Description: INTERVENTIONS:  -  Assess patient's ability to carry out ADLs; assess patient's baseline for ADL function and identify physical deficits which impact ability to perform ADLs (bathing, care of mouth/teeth, toileting, grooming, dressing, etc.)  - Assess/evaluate cause of self-care deficits   - Assess range of motion  - Assess patient's mobility; develop plan if impaired  - Assess patient's need for assistive devices and provide as appropriate  - Encourage maximum independence but intervene and supervise when necessary  - Involve family in performance of ADLs  - Assess for home care needs following discharge   - Consider OT consult to assist with ADL evaluation and planning for discharge  - Provide patient education as appropriate  Outcome: Progressing  Goal: Maintains/Returns to pre admission functional level  Description: INTERVENTIONS:  - Perform AM-PAC 6 Click Basic Mobility/ Daily Activity assessment daily.  - Set and communicate daily mobility goal to care team and patient/family/caregiver.   - Collaborate with rehabilitation services on mobility goals if consulted  - Reposition patient every 2 hours.  - Out of bed for toileting  - Record patient progress and toleration of activity level   Outcome: Progressing     Problem: DISCHARGE PLANNING  Goal: Discharge to home or other facility with appropriate resources  Description: INTERVENTIONS:  - Identify barriers to discharge w/patient and caregiver  - Arrange for needed discharge resources and transportation as appropriate  - Identify discharge learning needs (meds, wound care, etc.)  - Arrange for interpretive services to assist at discharge as needed  - Refer to Case Management Department for coordinating discharge planning if the patient needs post-hospital services  based on physician/advanced practitioner order or complex needs related to functional status, cognitive ability, or social support system  Outcome: Progressing     Problem: Knowledge Deficit  Goal: Patient/family/caregiver demonstrates understanding of disease process, treatment plan, medications, and discharge instructions  Description: Complete learning assessment and assess knowledge base.  Interventions:  - Provide teaching at level of understanding  - Provide teaching via preferred learning methods  Outcome: Progressing     Problem: Prexisting or High Potential for Compromised Skin Integrity  Goal: Skin integrity is maintained or improved  Description: INTERVENTIONS:  - Identify patients at risk for skin breakdown  - Assess and monitor skin integrity  - Assess and monitor nutrition and hydration status  - Monitor labs   - Assess for incontinence   - Turn and reposition patient  - Assist with mobility/ambulation  - Relieve pressure over bony prominences  - Avoid friction and shearing  - Provide appropriate hygiene as needed including keeping skin clean and dry  - Evaluate need for skin moisturizer/barrier cream  - Collaborate with interdisciplinary team   - Patient/family teaching  - Consider wound care consult   Outcome: Progressing     Problem: Nutrition/Hydration-ADULT  Goal: Nutrient/Hydration intake appropriate for improving, restoring or maintaining nutritional needs  Description: Monitor and assess patient's nutrition/hydration status for malnutrition. Collaborate with interdisciplinary team and initiate plan and interventions as ordered.  Monitor patient's weight and dietary intake as ordered or per policy. Utilize nutrition screening tool and intervene as necessary. Determine patient's food preferences and provide high-protein, high-caloric foods as appropriate.     INTERVENTIONS:  - Monitor oral intake, urinary output, labs, and treatment plans  - Assess nutrition and hydration status and recommend  course of action  - Evaluate amount of meals eaten  - Assist patient with eating if necessary   - Allow adequate time for meals  - Recommend/ encourage appropriate diets, oral nutritional supplements, and vitamin/mineral supplements  - Order, calculate, and assess calorie counts as needed  - Recommend, monitor, and adjust tube feedings and TPN/PPN based on assessed needs  - Assess need for intravenous fluids  - Provide specific nutrition/hydration education as appropriate  - Include patient/family/caregiver in decisions related to nutrition  Outcome: Progressing     Problem: SAFETY,RESTRAINT: NV/NON-SELF DESTRUCTIVE BEHAVIOR  Goal: Remains free of harm/injury (restraint for non violent/non self-detsructive behavior)  Description: INTERVENTIONS:  - Instruct patient/family regarding restraint use   - Assess and monitor physiologic and psychological status   - Provide interventions and comfort measures to meet assessed patient needs   - Identify and implement measures to help patient regain control  - Assess readiness for release of restraint   Outcome: Progressing  Goal: Returns to optimal restraint-free functioning  Description: INTERVENTIONS:  - Assess the patient's behavior and symptoms that indicate continued need for restraint  - Identify and implement measures to help patient regain control  - Assess readiness for release of restraint   Outcome: Progressing     Problem: GENITOURINARY - ADULT  Goal: Urinary catheter remains patent  Description: INTERVENTIONS:  - Assess patency of urinary catheter  - If patient has a chronic bhatti, consider changing catheter if non-functioning  - Follow guidelines for intermittent irrigation of non-functioning urinary catheter  Outcome: Progressing     Problem: METABOLIC, FLUID AND ELECTROLYTES - ADULT  Goal: Electrolytes maintained within normal limits  Description: INTERVENTIONS:  - Monitor labs and assess patient for signs and symptoms of electrolyte imbalances  - Administer  electrolyte replacement as ordered  - Monitor response to electrolyte replacements, including repeat lab results as appropriate  - Instruct patient on fluid and nutrition as appropriate  Outcome: Progressing  Goal: Fluid balance maintained  Description: INTERVENTIONS:  - Monitor labs   - Monitor I/O and WT  - Instruct patient on fluid and nutrition as appropriate  - Assess for signs & symptoms of volume excess or deficit  Outcome: Progressing     Problem: HEMATOLOGIC - ADULT  Goal: Maintains hematologic stability  Description: INTERVENTIONS  - Assess for signs and symptoms of bleeding or hemorrhage  - Monitor labs  - Administer supportive blood products/factors as ordered and appropriate  Outcome: Progressing     Problem: MUSCULOSKELETAL - ADULT  Goal: Maintain or return mobility to safest level of function  Description: INTERVENTIONS:  - Assess patient's ability to carry out ADLs; assess patient's baseline for ADL function and identify physical deficits which impact ability to perform ADLs (bathing, care of mouth/teeth, toileting, grooming, dressing, etc.)  - Assess/evaluate cause of self-care deficits   - Assess range of motion  - Assess patient's mobility  - Assess patient's need for assistive devices and provide as appropriate  - Encourage maximum independence but intervene and supervise when necessary  - Involve family in performance of ADLs  - Assess for home care needs following discharge   - Consider OT consult to assist with ADL evaluation and planning for discharge  - Provide patient education as appropriate  Outcome: Progressing  Goal: Maintain proper alignment of affected body part  Description: INTERVENTIONS:  - Support, maintain and protect limb and body alignment  - Provide patient/ family with appropriate education  Outcome: Progressing

## 2024-10-27 NOTE — ASSESSMENT & PLAN NOTE
MRI in 7/2024  Infectious/inflammatory changes from the occiput to cervical spine as   discussed above. Likely improved fluid collection in the left posterior   paraspinal soft tissues at C1-2 level. Otherwise no significant change from   06/29/2023 including epidural soft tissue thickening and enhancement and fluid   collection along the right aspect of C1-C2 joint and prevertebral space.   Patient went to hospice and completed 3 months of doxycycline  Still complains of pain - palliative care following for assistance with pain management  Pt was discharged for [facility] hospice services due to suspected diversion of narcotics by her sister Jaycee.  Palliative care and hospice services are following  palliative care service following - changed methadone to fentanyl patch to assist with discharge planning to a facility  Seen by neuropsychology - pt does not have capacity  Discharge planning to SNF for long term care  Will need guardianship  Pending safe D/C planning

## 2024-10-27 NOTE — PLAN OF CARE
Problem: PAIN - ADULT  Goal: Verbalizes/displays adequate comfort level or baseline comfort level  Description: Interventions:  - Encourage patient to monitor pain and request assistance  - Assess pain using appropriate pain scale  - Administer analgesics based on type and severity of pain and evaluate response  - Implement non-pharmacological measures as appropriate and evaluate response  - Consider cultural and social influences on pain and pain management  - Notify physician/advanced practitioner if interventions unsuccessful or patient reports new pain  Outcome: Progressing     Problem: SAFETY ADULT  Goal: Patient will remain free of falls  Description: INTERVENTIONS:  - Educate patient/family on patient safety including physical limitations  - Instruct patient to call for assistance with activity   - Consult OT/PT to assist with strengthening/mobility   - Keep Call bell within reach  - Keep bed low and locked with side rails adjusted as appropriate  - Keep care items and personal belongings within reach  - Initiate and maintain comfort rounds  - Make Fall Risk Sign visible to staff  - Offer Toileting every 2 Hours, in advance of need  - Initiate/Maintain bed alarm  - Obtain necessary fall risk management equipment: assistive devices  - Apply yellow socks and bracelet for high fall risk patients  - Consider moving patient to room near nurses station  Outcome: Progressing  Goal: Maintain or return to baseline ADL function  Description: INTERVENTIONS:  -  Assess patient's ability to carry out ADLs; assess patient's baseline for ADL function and identify physical deficits which impact ability to perform ADLs (bathing, care of mouth/teeth, toileting, grooming, dressing, etc.)  - Assess/evaluate cause of self-care deficits   - Assess range of motion  - Assess patient's mobility; develop plan if impaired  - Assess patient's need for assistive devices and provide as appropriate  - Encourage maximum independence but  intervene and supervise when necessary  - Involve family in performance of ADLs  - Assess for home care needs following discharge   - Consider OT consult to assist with ADL evaluation and planning for discharge  - Provide patient education as appropriate  Outcome: Progressing  Goal: Maintains/Returns to pre admission functional level  Description: INTERVENTIONS:  - Perform AM-PAC 6 Click Basic Mobility/ Daily Activity assessment daily.  - Set and communicate daily mobility goal to care team and patient/family/caregiver.   - Collaborate with rehabilitation services on mobility goals if consulted    - Out of bed for toileting  - Record patient progress and toleration of activity level   Outcome: Progressing

## 2024-10-28 VITALS
SYSTOLIC BLOOD PRESSURE: 132 MMHG | DIASTOLIC BLOOD PRESSURE: 82 MMHG | BODY MASS INDEX: 21.2 KG/M2 | RESPIRATION RATE: 16 BRPM | WEIGHT: 124.16 LBS | HEIGHT: 64 IN | HEART RATE: 90 BPM | OXYGEN SATURATION: 98 % | TEMPERATURE: 97.5 F

## 2024-10-28 PROCEDURE — 97530 THERAPEUTIC ACTIVITIES: CPT

## 2024-10-28 PROCEDURE — 99239 HOSP IP/OBS DSCHRG MGMT >30: CPT | Performed by: FAMILY MEDICINE

## 2024-10-28 PROCEDURE — 97535 SELF CARE MNGMENT TRAINING: CPT

## 2024-10-28 PROCEDURE — 99233 SBSQ HOSP IP/OBS HIGH 50: CPT | Performed by: STUDENT IN AN ORGANIZED HEALTH CARE EDUCATION/TRAINING PROGRAM

## 2024-10-28 PROCEDURE — NC001 PR NO CHARGE: Performed by: FAMILY MEDICINE

## 2024-10-28 RX ORDER — LORATADINE 10 MG/1
10 TABLET ORAL DAILY
Qty: 30 TABLET | Refills: 0 | Status: SHIPPED | OUTPATIENT
Start: 2024-10-29

## 2024-10-28 RX ORDER — BUPROPION HYDROCHLORIDE 150 MG/1
150 TABLET ORAL DAILY
Qty: 30 TABLET | Refills: 0 | Status: SHIPPED | OUTPATIENT
Start: 2024-10-29

## 2024-10-28 RX ORDER — SENNOSIDES 8.6 MG
17.2 TABLET ORAL 2 TIMES DAILY
Qty: 120 TABLET | Refills: 0 | Status: SHIPPED | OUTPATIENT
Start: 2024-10-28

## 2024-10-28 RX ORDER — LIDOCAINE 50 MG/G
1 PATCH TOPICAL DAILY
Qty: 60 PATCH | Refills: 0 | Status: SHIPPED | OUTPATIENT
Start: 2024-10-29

## 2024-10-28 RX ADMIN — GABAPENTIN 250 MG: 250 SOLUTION ORAL at 10:28

## 2024-10-28 RX ADMIN — CLONAZEPAM 1 MG: 1 TABLET ORAL at 10:22

## 2024-10-28 RX ADMIN — VENLAFAXINE HYDROCHLORIDE 225 MG: 150 CAPSULE, EXTENDED RELEASE ORAL at 10:23

## 2024-10-28 RX ADMIN — OXYCODONE HYDROCHLORIDE 15 MG: 5 TABLET ORAL at 13:26

## 2024-10-28 RX ADMIN — ACETAMINOPHEN 320 MG: 650 SUSPENSION ORAL at 10:22

## 2024-10-28 RX ADMIN — OXYCODONE HYDROCHLORIDE 15 MG: 5 TABLET ORAL at 04:36

## 2024-10-28 RX ADMIN — NYSTATIN: 100000 CREAM TOPICAL at 10:24

## 2024-10-28 RX ADMIN — OXYCODONE HYDROCHLORIDE 15 MG: 5 TABLET ORAL at 10:21

## 2024-10-28 RX ADMIN — BUPROPION HYDROCHLORIDE 150 MG: 150 TABLET, EXTENDED RELEASE ORAL at 10:22

## 2024-10-28 RX ADMIN — NICOTINE 14 MG: 14 PATCH, EXTENDED RELEASE TRANSDERMAL at 10:29

## 2024-10-28 RX ADMIN — SENNOSIDES 17.2 MG: 8.6 TABLET, FILM COATED ORAL at 10:22

## 2024-10-28 RX ADMIN — LORATADINE 10 MG: 10 TABLET ORAL at 10:22

## 2024-10-28 RX ADMIN — ENOXAPARIN SODIUM 40 MG: 40 INJECTION SUBCUTANEOUS at 10:31

## 2024-10-28 RX ADMIN — METHOCARBAMOL 500 MG: 500 TABLET ORAL at 10:22

## 2024-10-28 NOTE — ASSESSMENT & PLAN NOTE
MRI in 7/2024  Infectious/inflammatory changes from the occiput to cervical spine as   discussed above. Likely improved fluid collection in the left posterior   paraspinal soft tissues at C1-2 level. Otherwise no significant change from   06/29/2023 including epidural soft tissue thickening and enhancement and fluid   collection along the right aspect of C1-C2 joint and prevertebral space.   Patient went to hospice and completed 3 months of doxycycline  Still complains of pain - palliative care following for assistance with pain management  Pt was discharged for [facility] hospice services due to suspected diversion of narcotics by her sister Jaycee.  Palliative care and hospice services are following  palliative care service following - changed methadone to fentanyl patch to assist with discharge planning to a facility  Patient was reevaluated by neuropsychology was found to have competency  Patient is interested in acute rehab  Case management assisting to find facility for patient

## 2024-10-28 NOTE — PHYSICAL THERAPY NOTE
"                                                                                  PHYSICAL THERAPY NOTE          Patient Name: Alyssa Madrigal  Today's Date: 10/28/2024       10/28/24 1059   PT Last Visit   PT Visit Date 10/28/24   Note Type   Note Type Treatment   Type of Brace   Brace Applied [place] Collar Set  (PRN for comfort and for mobility.)   Additional Brace Ordered No   Patient Position When Brace Applied Seated   Bracing Recommendations None   Education   Education Provided Yes   End of Consult   Patient Position at End of Consult Bedside chair;All needs within reach;Bed/Chair alarm activated  (Pt remained wearing [place] Collar upon sitting in bedside chair at conclusion of therapy session.)   Pain Assessment   Pain Assessment Tool 0-10   Pain Score 8   Pain Location/Orientation Location: Neck;Location: Back   Pain Onset/Description Onset: Ongoing   Effect of Pain on Daily Activities Increased time required for mobility.   Patient's Stated Pain Goal No pain   Hospital Pain Intervention(s) Repositioned;Ambulation/increased activity;Emotional support   Restrictions/Precautions   Weight Bearing Precautions Per Order No   Braces or Orthoses (S)  C/S Collar  (Per neuro note, \"VISTA collar ordered to help with pain control PRN. Recommend using if patient works with therapy for some aspect of protection. Ok to remove when in chair, bed, eating, and sleeping\")   Other Precautions Cognitive;Chair Alarm;Bed Alarm;Multiple lines;Fall Risk;Pain   General   Chart Reviewed Yes   Family/Caregiver Present No   Cognition   Overall Cognitive Status Impaired   Arousal/Participation Alert;Cooperative   Attention Attends with cues to redirect   Orientation Level Oriented to person;Oriented to place;Oriented to time;Disoriented to situation   Following Commands Follows one step commands with increased time or repetition   Comments Pt was pleasant and cooperative during therapy session. Pt recently seen by neuropsych " "and deemed competent to make her own medical decisions. Pt notes that she sometimes has difficulty determining what has happened vs what she dreamt of. Pt requires verbal cues for safety and awareness, as well as to redirect to task.   Subjective   Subjective \"I want to go for a walk\"   Bed Mobility   Supine to Sit 3  Moderate assistance   Additional items Assist x 1;HOB elevated;Bedrails;Increased time required;Verbal cues;LE management   Sit to Supine Unable to assess   Additional Comments Pt in bed upon therapy arrival. Pt OOB and in recliner chair at conclusion of therapy session with all needs within reach + chair alarm activated.   Transfers   Sit to Stand 3  Moderate assistance   Additional items Assist x 2;Increased time required;Verbal cues   Stand to Sit 3  Moderate assistance   Additional items Assist x 2;Increased time required;Verbal cues   Stand pivot 3  Moderate assistance   Additional items Increased time required;Verbal cues;Assist x 2   Additional Comments Pt wears custom shoes for txfs. Pt utilized B/L HHA and support under her sacrum to help reach a standing position. Pt placed hands on window sill for support once standing.   Ambulation/Elevation   Gait pattern Improper Weight shift;Antalgic;Narrow JUAN LUIS;Forward Flexion;Decreased foot clearance;Decreased R stance;Inconsistent davi;Short stride;Step to;Excessively slow;R Knee Mario;L Knee Mario   Gait Assistance 2  Maximal assist   Additional items Assist x 2;Verbal cues   Assistive Device Other (Comment)  (B/L HHA and support under her sacrum)   Distance 2 ft   Ambulation/Elevation Additional Comments Pt ambulated 2 ft with MaxA x2 with B/L HHA and support under her sacrum. Pt was able to slightly advance either LE, but demonstrated knee buckling bilaterally and was unable to support herself. Pt had chair follow to help sit down after 3 steps.   Balance   Static Sitting Fair -   Dynamic Sitting Poor +   Static Standing Poor   Dynamic " Standing Poor -   Ambulatory Poor -   Endurance Deficit   Endurance Deficit Yes   Endurance Deficit Description Due to limited mobility, LE strength, and muscular endurance.   Activity Tolerance   Activity Tolerance Patient limited by fatigue;Patient limited by pain  (Pt notes extreme neck pain rated at an 8/10, worse on her R side.)   Medical Staff Made Aware Co-treat with OT due to increased medical complexity and safety concerns.   Nurse Made Aware RN cleared pt for therapy.   Exercises   Knee AROM Long Arc Quad Sitting;5 reps;Bilateral   Ankle Pumps Sitting;10 reps;Bilateral   Marching Sitting;5 reps;Bilateral   Balance training  Pt worked on standing balance at window-sill with ModAx2 for arm and sacral support. Pt placed hands at window-sill to help hold herself up. Notes that she was happy to see the sun and that it felt good.   Assessment   Prognosis Fair   Problem List Decreased strength;Decreased endurance;Impaired balance;Decreased mobility;Decreased cognition;Decreased safety awareness;Orthopedic restrictions;Pain   Assessment Pt seen for PT treatment session this date. Therapy session focused on bed mobility, functional transfers, and ambulation in order to improve overall mobility and independence. Pt requires ModA x1 for bed mobility, ModA x2 for transfers, and MaxA x2 for ambulation currently. Pt currently wearing [place] collar during mobility to help with pain relief and comfort. Pt completed STS transfer with ModA x2 to help prevent knee buckling and to help reach a standing position, with B/L HHA and support under her sacrum. Pt ambulated 2 ft today with MaxA x2 with B/L HHA and support under her sacrum due to B/L knee buckling and inefficiently advancing her LE. Pt had chair follow to ensure safety when she needed to sit. Pt also completed seated marches, leg extensions, and ankle pumps to help increase LE strength due to decreased strength and endurance bilaterally. Pt making steady  progress toward goals. Pt was positioned in bedside recliner + chair alarm activated at the end of PT session with all needs in reach. Pt would benefit from continued PT services while in hospital to address remaining limitations. The patient's AM-PAC Basic Mobility Inpatient Short Form Raw Score is 10. A Raw score of less than or equal to 16 suggests the patient may benefit from discharge to post-acute rehabilitation services. Please also refer to the recommendation of the Physical Therapist for safe discharge planning.   Barriers to Discharge Decreased caregiver support   Barriers to Discharge Comments Pt currently lives alone   Goals   Patient Goals To walk again   STG Expiration Date 11/07/24   PT Treatment Day 1   Plan   Treatment/Interventions Functional transfer training;Therapeutic exercise;Endurance training;Bed mobility;Gait training;Spoke to nursing;Spoke to case management;OT   Progress Slow progress, decreased activity tolerance   PT Frequency 2-3x/wk  (Pending further progress in hospital)   Discharge Recommendation   Rehab Resource Intensity Level, PT II (Moderate Resource Intensity)   Equipment Recommended Wheelchair;Walker  (Pt currently non-ambulatory and requires a WC to complete ADLs. Pt utilizes RW and ModA x2 to complete STS transfer.)   AM-PAC Basic Mobility Inpatient   Turning in Flat Bed Without Bedrails 2   Lying on Back to Sitting on Edge of Flat Bed Without Bedrails 2   Moving Bed to Chair 2   Standing Up From Chair Using Arms 2   Walk in Room 1   Climb 3-5 Stairs With Railing 1   Basic Mobility Inpatient Raw Score 10   Turning Head Towards Sound 3   Follow Simple Instructions 3   Low Function Basic Mobility Raw Score  16   Low Function Basic Mobility Standardized Score  25.72   [facility] Highest Level Of Mobility   -HLM Goal 4: Move to chair/commode   -HL Achieved 4: Move to chair/commode   Education   Education Provided Mobility training   Patient Reinforcement needed   End of  Consult   Patient Position at End of Consult Bedside chair;Bed/Chair alarm activated;All needs within reach   End of Consult Comments Pt positioned in bedside recliner at conclusion of therapy session with all needs within reach + chair alarm activated.     Joseline Zabala, SPT

## 2024-10-28 NOTE — OCCUPATIONAL THERAPY NOTE
"  Occupational Therapy Progress Note     Patient Name: Alyssa Madrigal  Today's Date: 10/28/2024  Problem List  Principal Problem:    Osteomyelitis (HCC)  Active Problems:    Controlled substance agreement broken    COPD (chronic obstructive pulmonary disease) with chronic bronchitis (HCC)    Major depressive disorder, recurrent episode with anxious distress (HCC)    Post laminectomy syndrome    Posttraumatic stress disorder    IVDU (intravenous drug user)    Fall    Palliative care by specialist    Tardive dyskinesia              10/28/24 1101   OT Last Visit   OT Visit Date 10/28/24   Note Type   Note Type Treatment   Pain Assessment   Pain Assessment Tool 0-10   Pain Score 8   Pain Location/Orientation Location: Neck;Location: Head   Pain Onset/Description Onset: Ongoing   Effect of Pain on Daily Activities increased time for functional activities   Patient's Stated Pain Goal No pain   Hospital Pain Intervention(s) Repositioned;Ambulation/increased activity;Emotional support   Restrictions/Precautions   Weight Bearing Precautions Per Order No   Braces or Orthoses (S)  C/S Collar  (Per neuro sx note, \"VISTA collar ordered to help with pain control PRN. Recommend using if patient works with therapy for some aspect of protection. Ok to remove when in chair, bed, eating, and sleeping\")   Other Precautions Cognitive;Chair Alarm;Bed Alarm;Multiple lines;Fall Risk;Pain   Lifestyle   Autonomy Assist PRN PTA with all ADLs/IADLs. Uses w/c mostly for functional mobility with SPT. (-).   Reciprocal Relationships Was living with Dtr, working with CM to determine guardianship   Service to Others Not currently working   Intrinsic Gratification Enjoys working with therapy   ADL   Where Assessed Chair   Grooming Assistance 4  Minimal Assistance   Grooming Deficit Wash/dry hands;Wash/dry face   UB Bathing Assistance 3  Moderate Assistance   UB Bathing Deficit Chest;Right arm;Left arm;Abdomen   LB Bathing Assistance 2  " "Maximal Assistance   LB Bathing Deficit Right upper leg;Left upper leg;Right lower leg including foot;Left lower leg including foot   UB Dressing Assistance 3  Moderate Assistance   UB Dressing Deficit Thread RUE;Thread LUE;Pull around back   LB Dressing Assistance 2  Maximal Assistance   LB Dressing Deficit Don/doff R sock;Don/doff L sock;Don/doff R shoe;Don/doff L shoe;Thread RLE into pants;Thread LLE into pants   Bed Mobility   Supine to Sit 3  Moderate assistance   Additional items Assist x 1;HOB elevated;Bedrails;Increased time required;Verbal cues;LE management   Sit to Supine Unable to assess   Additional Comments Pt OOB in recliner post session, all needs met, call bell within reach. +chair alarm on   Transfers   Sit to Stand 3  Moderate assistance   Additional items Assist x 2;Increased time required;Verbal cues   Stand to Sit 3  Moderate assistance   Additional items Assist x 2;Increased time required;Verbal cues   Stand pivot 3  Moderate assistance   Additional items Assist x 2;Increased time required;Verbal cues   Additional Comments HHA + sacral support. Custom shoes worn for txfs.   Functional Mobility   Functional Mobility 2  Maximal assistance   Additional Comments AX2. small steps. +b/l knee buckling. HHA   Additional items Hand hold assistance   Subjective   Subjective \"I've been asking to get up for 3 hours\"   Cognition   Overall Cognitive Status Impaired   Arousal/Participation Alert;Cooperative   Attention Attends with cues to redirect   Orientation Level Oriented to person;Oriented to place;Oriented to time;Disoriented to situation   Memory Decreased recall of precautions;Decreased recall of recent events;Decreased long term memory   Following Commands Follows one step commands with increased time or repetition   Comments Pt overall present and cooperative. Motivated to participate. Pt recently seen by neuropsych, deemed COMPETENT at this time. Pt still with decreased insight and understanding " of situation. Overall able to have full conversation with therapists, but at times states information that is not true. Pt does report on this date that she sometimes has a hard time determining what is in her head and what is reality. Overall pt requiring VCs for safety and awareness.   Activity Tolerance   Activity Tolerance Patient limited by fatigue;Patient limited by pain   Medical Staff Made Aware RN cleared for therapy   Assessment   Assessment Patient participated in Skilled OT session this date with interventions consisting of ADL re training with the use of correct body mechnaics, Energy Conservation techniques, safety awareness and fall prevention techniques,  therapeutic activities to: increase activity tolerance, and increase OOB/ sitting tolerance . Patient agreeable to OT treatment session, upon arrival patient was found supine in bed, alert, responsive , and in no apparent distress.  In comparison to previous session, patient with improvements in ADL completion, functional mobility. Patient requiring frequent re direction, one step directives, frequent rest periods, and ocassional safety reminders. Pt completed functional STS txfs with mod Ax2, max Ax2 for functional mobility to take 1-2 steps. Pt required min A for grooming. Mod A for UB bathing and UB dressing to don/doff gownn. Max A for LB bathing and LB dressing to don/doff socks, shoes, pants. Patient continues to be functioning below baseline level, occupational performance remains limited secondary to factors listed above and increased risk for falls and injury. The patient's raw score on the AM-PAC Daily Activity Inpatient Short Form is 13. A raw score of less than 19 suggests the patient may benefit from discharge to post-acute rehabilitation services. Please refer to the recommendation of the Occupational Therapist for safe discharge planning. From OT standpoint, recommendation at time of d/c would be Level 2 resources. Patient to benefit  from continued Occupational Therapy treatment while in the hospital to address deficits as defined above and maximize level of functional independence with ADLs and functional mobility.   Plan   Treatment Interventions ADL retraining;Functional transfer training;Endurance training;Cognitive reorientation;Patient/family training;Equipment evaluation/education;Compensatory technique education;Continued evaluation;Energy conservation;Activityengagement   Goal Expiration Date 11/07/24   OT Treatment Day 1   OT Frequency 1-2x/wk   Discharge Recommendation   Rehab Resource Intensity Level, OT II (Moderate Resource Intensity)   AM-PAC Daily Activity Inpatient   Lower Body Dressing 2   Bathing 2   Toileting 2   Upper Body Dressing 2   Grooming 2   Eating 3   Daily Activity Raw Score 13   Daily Activity Standardized Score (Calc for Raw Score >=11) 32.03   AM-PAC Applied Cognition Inpatient   Following a Speech/Presentation 2   Understanding Ordinary Conversation 4   Taking Medications 2   Remembering Where Things Are Placed or Put Away 2   Remembering List of 4-5 Errands 2   Taking Care of Complicated Tasks 2   Applied Cognition Raw Score 14   Applied Cognition Standardized Score 32.02   End of Consult   Education Provided Yes   Patient Position at End of Consult Bedside chair;Bed/Chair alarm activated;All needs within reach   Nurse Communication Nurse aware of consult         Richar Osman MS, OTR/L     [facility] CERTIFIED RATER ID [id number]

## 2024-10-28 NOTE — CASE MANAGEMENT
Case Management Discharge Planning Note    Patient name Alyssa Madrigal  Location [facility] 815/[facility] 815-01 MRN [id number]  : 1960 Date 10/28/2024       OBJECTIVE:  Risk of Unplanned Readmission Score: 15.29     DISCHARGE DETAILS:    CM responded to pt's request to speak to this CM.  Pt states that she is leaving and is going home.  Pt states that she is now okay with Jaycee being involved with her care.  Pt states that her friend Sunday is going to be coming to pick her up.  CM explained risks to pt of leaving hospital at this time.   CM explained to pt that the provider will not be prescribing any pain meds for her, including fentanyl patch and Ativan.  Pt states she understands.  CM following.    CM called and informed Krsityn Zavala from [facility] that pt went home.

## 2024-10-28 NOTE — PLAN OF CARE
Problem: PAIN - ADULT  Goal: Verbalizes/displays adequate comfort level or baseline comfort level  Description: Interventions:  - Encourage patient to monitor pain and request assistance  - Assess pain using appropriate pain scale  - Administer analgesics based on type and severity of pain and evaluate response  - Implement non-pharmacological measures as appropriate and evaluate response  - Consider cultural and social influences on pain and pain management  - Notify physician/advanced practitioner if interventions unsuccessful or patient reports new pain  Outcome: Progressing     Problem: INFECTION - ADULT  Goal: Absence or prevention of progression during hospitalization  Description: INTERVENTIONS:  - Assess and monitor for signs and symptoms of infection  - Monitor lab/diagnostic results  - Monitor all insertion sites, i.e. indwelling lines, tubes, and drains  - Monitor endotracheal if appropriate and nasal secretions for changes in amount and color  - [place] appropriate cooling/warming therapies per order  - Administer medications as ordered  - Instruct and encourage patient and family to use good hand hygiene technique  - Identify and instruct in appropriate isolation precautions for identified infection/condition  Outcome: Progressing     Problem: SAFETY ADULT  Goal: Patient will remain free of falls  Description: INTERVENTIONS:  - Educate patient/family on patient safety including physical limitations  - Instruct patient to call for assistance with activity   - Consult OT/PT to assist with strengthening/mobility   - Keep Call bell within reach  - Keep bed low and locked with side rails adjusted as appropriate  - Keep care items and personal belongings within reach  - Initiate and maintain comfort rounds  - Make Fall Risk Sign visible to staff  - Offer Toileting every 2 Hours, in advance of need  - Initiate/Maintain bed alarm  - Obtain necessary fall risk management equipment: assistive devices  - Apply  yellow socks and bracelet for high fall risk patients  - Consider moving patient to room near nurses station  Outcome: Progressing  Goal: Maintain or return to baseline ADL function  Description: INTERVENTIONS:  -  Assess patient's ability to carry out ADLs; assess patient's baseline for ADL function and identify physical deficits which impact ability to perform ADLs (bathing, care of mouth/teeth, toileting, grooming, dressing, etc.)  - Assess/evaluate cause of self-care deficits   - Assess range of motion  - Assess patient's mobility; develop plan if impaired  - Assess patient's need for assistive devices and provide as appropriate  - Encourage maximum independence but intervene and supervise when necessary  - Involve family in performance of ADLs  - Assess for home care needs following discharge   - Consider OT consult to assist with ADL evaluation and planning for discharge  - Provide patient education as appropriate  Outcome: Progressing  Goal: Maintains/Returns to pre admission functional level  Description: INTERVENTIONS:  - Perform AM-PAC 6 Click Basic Mobility/ Daily Activity assessment daily.  - Set and communicate daily mobility goal to care team and patient/family/caregiver.   - Collaborate with rehabilitation services on mobility goals if consulted  - Perform Range of Motion 3 times a day.  - Reposition patient every 3 hours.  - Dangle patient 3 times a day  - Stand patient 3 times a day  - Ambulate patient 3 times a day  - Out of bed to chair 3 times a day   - Out of bed for meals 3 times a day  - Out of bed for toileting  - Record patient progress and toleration of activity level   Outcome: Progressing     Problem: DISCHARGE PLANNING  Goal: Discharge to home or other facility with appropriate resources  Description: INTERVENTIONS:  - Identify barriers to discharge w/patient and caregiver  - Arrange for needed discharge resources and transportation as appropriate  - Identify discharge learning needs  (meds, wound care, etc.)  - Arrange for interpretive services to assist at discharge as needed  - Refer to Case Management Department for coordinating discharge planning if the patient needs post-hospital services based on physician/advanced practitioner order or complex needs related to functional status, cognitive ability, or social support system  Outcome: Progressing     Problem: Knowledge Deficit  Goal: Patient/family/caregiver demonstrates understanding of disease process, treatment plan, medications, and discharge instructions  Description: Complete learning assessment and assess knowledge base.  Interventions:  - Provide teaching at level of understanding  - Provide teaching via preferred learning methods  Outcome: Progressing     Problem: Prexisting or High Potential for Compromised Skin Integrity  Goal: Skin integrity is maintained or improved  Description: INTERVENTIONS:  - Identify patients at risk for skin breakdown  - Assess and monitor skin integrity  - Assess and monitor nutrition and hydration status  - Monitor labs   - Assess for incontinence   - Turn and reposition patient  - Assist with mobility/ambulation  - Relieve pressure over bony prominences  - Avoid friction and shearing  - Provide appropriate hygiene as needed including keeping skin clean and dry  - Evaluate need for skin moisturizer/barrier cream  - Collaborate with interdisciplinary team   - Patient/family teaching  - Consider wound care consult   Outcome: Progressing     Problem: Nutrition/Hydration-ADULT  Goal: Nutrient/Hydration intake appropriate for improving, restoring or maintaining nutritional needs  Description: Monitor and assess patient's nutrition/hydration status for malnutrition. Collaborate with interdisciplinary team and initiate plan and interventions as ordered.  Monitor patient's weight and dietary intake as ordered or per policy. Utilize nutrition screening tool and intervene as necessary. Determine patient's food  preferences and provide high-protein, high-caloric foods as appropriate.     INTERVENTIONS:  - Monitor oral intake, urinary output, labs, and treatment plans  - Assess nutrition and hydration status and recommend course of action  - Evaluate amount of meals eaten  - Assist patient with eating if necessary   - Allow adequate time for meals  - Recommend/ encourage appropriate diets, oral nutritional supplements, and vitamin/mineral supplements  - Order, calculate, and assess calorie counts as needed  - Recommend, monitor, and adjust tube feedings and TPN/PPN based on assessed needs  - Assess need for intravenous fluids  - Provide specific nutrition/hydration education as appropriate  - Include patient/family/caregiver in decisions related to nutrition  Outcome: Progressing     Problem: SAFETY,RESTRAINT: NV/NON-SELF DESTRUCTIVE BEHAVIOR  Goal: Remains free of harm/injury (restraint for non violent/non self-detsructive behavior)  Description: INTERVENTIONS:  - Instruct patient/family regarding restraint use   - Assess and monitor physiologic and psychological status   - Provide interventions and comfort measures to meet assessed patient needs   - Identify and implement measures to help patient regain control  - Assess readiness for release of restraint   Outcome: Progressing  Goal: Returns to optimal restraint-free functioning  Description: INTERVENTIONS:  - Assess the patient's behavior and symptoms that indicate continued need for restraint  - Identify and implement measures to help patient regain control  - Assess readiness for release of restraint   Outcome: Progressing     Problem: GENITOURINARY - ADULT  Goal: Urinary catheter remains patent  Description: INTERVENTIONS:  - Assess patency of urinary catheter  - If patient has a chronic bhatti, consider changing catheter if non-functioning  - Follow guidelines for intermittent irrigation of non-functioning urinary catheter  Outcome: Progressing     Problem: METABOLIC,  FLUID AND ELECTROLYTES - ADULT  Goal: Electrolytes maintained within normal limits  Description: INTERVENTIONS:  - Monitor labs and assess patient for signs and symptoms of electrolyte imbalances  - Administer electrolyte replacement as ordered  - Monitor response to electrolyte replacements, including repeat lab results as appropriate  - Instruct patient on fluid and nutrition as appropriate  Outcome: Progressing  Goal: Fluid balance maintained  Description: INTERVENTIONS:  - Monitor labs   - Monitor I/O and WT  - Instruct patient on fluid and nutrition as appropriate  - Assess for signs & symptoms of volume excess or deficit  Outcome: Progressing     Problem: HEMATOLOGIC - ADULT  Goal: Maintains hematologic stability  Description: INTERVENTIONS  - Assess for signs and symptoms of bleeding or hemorrhage  - Monitor labs  - Administer supportive blood products/factors as ordered and appropriate  Outcome: Progressing     Problem: MUSCULOSKELETAL - ADULT  Goal: Maintain or return mobility to safest level of function  Description: INTERVENTIONS:  - Assess patient's ability to carry out ADLs; assess patient's baseline for ADL function and identify physical deficits which impact ability to perform ADLs (bathing, care of mouth/teeth, toileting, grooming, dressing, etc.)  - Assess/evaluate cause of self-care deficits   - Assess range of motion  - Assess patient's mobility  - Assess patient's need for assistive devices and provide as appropriate  - Encourage maximum independence but intervene and supervise when necessary  - Involve family in performance of ADLs  - Assess for home care needs following discharge   - Consider OT consult to assist with ADL evaluation and planning for discharge  - Provide patient education as appropriate  Outcome: Progressing  Goal: Maintain proper alignment of affected body part  Description: INTERVENTIONS:  - Support, maintain and protect limb and body alignment  - Provide patient/ family with  appropriate education  Outcome: Progressing

## 2024-10-28 NOTE — ASSESSMENT & PLAN NOTE
Patient was discharged from [facility] due to suspected narcotic-diversion:  Patient will not be offered any controlled-substances on discharge, unless she is discharged to a skilled nursing facility.

## 2024-10-28 NOTE — PLAN OF CARE
Problem: PHYSICAL THERAPY ADULT  Goal: Performs mobility at highest level of function for planned discharge setting.  See evaluation for individualized goals.  Description: Treatment/Interventions: Functional transfer training, Therapeutic exercise, Bed mobility, Spoke to nursing, Spoke to case management, OT          See flowsheet documentation for full assessment, interventions and recommendations.  Outcome: Progressing  Note: Prognosis: Fair  Problem List: Decreased strength, Decreased endurance, Impaired balance, Decreased mobility, Decreased cognition, Decreased safety awareness, Orthopedic restrictions, Pain  Assessment: Pt seen for PT treatment session this date. Therapy session focused on bed mobility, functional transfers, and ambulation in order to improve overall mobility and independence. Pt requires ModA x1 for bed mobility, ModA x2 for transfers, and MaxA x2 for ambulation currently. Pt currently wearing [place] collar during mobility to help with pain relief and comfort. Pt completed STS transfer with ModA x2 to help prevent knee buckling and to help reach a standing position, with B/L HHA and support under her sacrum. Pt ambulated 2 ft today with MaxA x2 with B/L HHA and support under her sacrum due to B/L knee buckling and inefficiently advancing her LE. Pt had chair follow to ensure safety when she needed to sit. Pt also completed seated marches, leg extensions, and ankle pumps to help increase LE strength due to decreased strength and endurance bilaterally. Pt making steady progress toward goals. Pt was positioned in bedside recliner + chair alarm activated at the end of PT session with all needs in reach. Pt would benefit from continued PT services while in hospital to address remaining limitations. The patient's AM-PAC Basic Mobility Inpatient Short Form Raw Score is 10. A Raw score of less than or equal to 16 suggests the patient may benefit from discharge to post-acute rehabilitation  services. Please also refer to the recommendation of the Physical Therapist for safe discharge planning.  Barriers to Discharge: Decreased caregiver support  Barriers to Discharge Comments: Pt currently lives alone  Rehab Resource Intensity Level, PT: II (Moderate Resource Intensity)    See flowsheet documentation for full assessment.

## 2024-10-28 NOTE — DISCHARGE SUMMARY
Discharge Summary - Hospitalist   Name: Alyssa Madrigal 64 y.o. female I MRN: [id number]  [facility] I Date of Admission: 10/8/2024   Date of Service: 10/28/2024 I Hospital Day: 20     Assessment & Plan  Osteomyelitis (HCC)  MRI in 7/2024  Infectious/inflammatory changes from the occiput to cervical spine as   discussed above. Likely improved fluid collection in the left posterior   paraspinal soft tissues at C1-2 level. Otherwise no significant change from   06/29/2023 including epidural soft tissue thickening and enhancement and fluid   collection along the right aspect of C1-C2 joint and prevertebral space.   Patient went to hospice and completed 3 months of doxycycline  Still complains of pain - palliative care following for assistance with pain management  Pt was discharged for [facility] hospice services due to suspected diversion of narcotics by her sister Jaycee.  Palliative care and hospice services are following  palliative care service following - changed methadone to fentanyl patch to assist with discharge planning to a facility  Patient reevaluated by neuropsychiatry and was found to be competent  Patient was agreeable to rehab initially now changed her mind and would like to leave.  Patient understands that she will be discharged with no pain medications, unless she is in a facility.  Patient is agreeable.    Controlled substance agreement broken  Plan noted above  COPD (chronic obstructive pulmonary disease) with chronic bronchitis (HCC)  Continue albuterol  Major depressive disorder, recurrent episode with anxious distress (HCC)  Continue venlaflaxine  IVDU (intravenous drug user)  Noted in history  Fall  Had a fall out of bed on 10/11. CT scans and Xrs are without any new traumatic injury  Fall precautions  Continue analgesics     Medical Problems       Resolved Problems  Date Reviewed: 11/1/2022   None       Discharging Physician / Practitioner: Mao Martinez MD  PCP: Edenilson Thomas,  DO  Admission Date:   Admission Orders (From admission, onward)       Ordered        10/08/24 2010  INPATIENT ADMISSION  Once                          Discharge Date: 10/28/24    Consultations During Hospital Stay:  [facility] health  Neurosurgery  Palliative care    Procedures Performed:   None    Significant Findings / Test Results:   None    Incidental Findings:   CT spine cervical wo contrast: No evidence of acute fracture, traumatic subluxation, or atlantooccipital dislocation., Postoperative changes as detailed above with evidence of multilevel loosening around left-sided screws as detailed above   I reviewed the above mentioned incidental findings with the patient and/or family and they expressed understanding.    Test Results Pending at Discharge (will require follow up):   None     Outpatient Tests Requested:  None    Complications:  None    Reason for Admission: Osteomyelitis    Hospital Course:   Alyssa Madrigal is a 64 y.o. female patient who originally presented to the hospital on 10/8/2024 due to osteomyelitis of cervical spine.  Patient presented to [facility] emergency department after being released from [facility] due to drug diversion.  Initially patient was found not to have competency and case management was working on getting guardianship.  Patient was reevaluated per her request and was found to be competent.  Patient was evaluated by neurosurgery and was found to not be a good surgical candidate.  Patient was evaluated by PT and OT was recommended to go to acute rehab however patient is adamantly refusing and would like to leave immediately.  Patient understands risks associated with this decision.      Please see above list of diagnoses and related plan for additional information.     Condition at Discharge: stable    Discharge Day Visit / Exam:   Subjective: Is a very pleasant 64-year-old female who was seen evaluated today at bedside.  Patient denies any acute complaints at this  "time.  Patient is very eager to go home.  Patient adamantly refuses any further medical intervention.  Patient refuses to go to acute rehab despite all risks associated with decision to be explained to her.  Patient acknowledges understanding risks.  Vitals: Blood Pressure: 132/82 (10/28/24 0854)  Pulse: 90 (10/27/24 1436)  Temperature: 97.5 °F (36.4 °C) (10/28/24 0854)  Temp Source: Oral (10/16/24 2300)  Respirations: 16 (10/28/24 0854)  Height: 5' 4\" (162.6 cm) (10/09/24 1507)  Weight - Scale: 56.3 kg (124 lb 2.6 oz) (10/28/24 0600)  SpO2: 98 % (10/27/24 1436)  Physical Exam  Vitals reviewed.   Constitutional:       General: She is not in acute distress.     Appearance: She is not ill-appearing.   HENT:      Head: Normocephalic.   Eyes:      Conjunctiva/sclera: Conjunctivae normal.   Cardiovascular:      Rate and Rhythm: Normal rate and regular rhythm.   Pulmonary:      Effort: Pulmonary effort is normal.   Abdominal:      General: Abdomen is flat.      Palpations: Abdomen is soft.      Tenderness: There is no abdominal tenderness.   Skin:     General: Skin is warm and dry.   Neurological:      Mental Status: She is alert. Mental status is at baseline.          Discussion with Family: Patient declined call to .     Discharge instructions/Information to patient and family:   See after visit summary for information provided to patient and family.      Provisions for Follow-Up Care:  See after visit summary for information related to follow-up care and any pertinent home health orders.      Mobility at time of Discharge:   Basic Mobility Inpatient Raw Score: 14  JH-HLM Goal: 4: Move to chair/commode  JH-HLM Achieved: 1: Laying in bed  HLM Goal NOT achieved. Continue to encourage mobility in post discharge setting.     Disposition:   Home    Planned Readmission: None, patient high risk for readmission    Discharge Medications:  See after visit summary for reconciled discharge medications provided to " patient and/or family.      Administrative Statements   Discharge Statement:  I have spent a total time of 45 minutes in caring for this patient on the day of the visit/encounter. >30 minutes of time was spent on: Diagnostic results, Prognosis, Patient and family education, Impressions, Documenting in the medical record, and Reviewing / ordering tests, medicine, procedures  .    **Please Note: This note may have been constructed using a voice recognition system**

## 2024-10-28 NOTE — ASSESSMENT & PLAN NOTE
MRI in 7/2024  Infectious/inflammatory changes from the occiput to cervical spine as   discussed above. Likely improved fluid collection in the left posterior   paraspinal soft tissues at C1-2 level. Otherwise no significant change from   06/29/2023 including epidural soft tissue thickening and enhancement and fluid   collection along the right aspect of C1-C2 joint and prevertebral space.   Patient went to hospice and completed 3 months of doxycycline  Still complains of pain - palliative care following for assistance with pain management  Pt was discharged for [facility] hospice services due to suspected diversion of narcotics by her sister Jaycee.  Palliative care and hospice services are following  palliative care service following - changed methadone to fentanyl patch to assist with discharge planning to a facility  Patient reevaluated by neuropsychiatry and was found to be competent  Patient was agreeable to rehab initially now changed her mind and would like to leave.  Patient understands that she will be discharged with no pain medications, unless she is in a facility.  Patient is agreeable.

## 2024-10-28 NOTE — PROGRESS NOTES
Progress Note - Palliative Care   Name: Alyssa Madrigal 64 y.o. female I MRN: [id number]  Unit/Bed#: [facility] 815-01 I Date of Admission: 10/8/2024   Date of Service: 10/28/2024 I Hospital Day: 20     Assessment & Plan  Osteomyelitis (HCC)  MRI Cervical Spine in July 2024: Infectious/inflammatory changes from the occiput to cervical spine, likely improved fluid collection in the left posterior paraspinal soft tissues at C1-2 level. Patient transitioned to hospice through [facility], however, was subsequently discharged due to suspected diversion of narcotics by her patient's sister (Jaycee).    Neuropsychology reevaluated the patient 10/28/2024 and deemed she has competency to make medical decisions  Discharge plan for home with home health  Major depressive disorder, recurrent episode with anxious distress (HCC)  Continue Bupropion 150 mg Daily (Anxious and Impulsive Behavior).  Continue Gabapentin 250 mg TID (Anxiety).  Continue Venlafaxine 225 mg Daily and Quetiapine 150 mg Bedtime.   Continue PRN regimen for anxiety, and agitation.  IVDU (intravenous drug user)  Patient will not be offered any controlled-substances on discharge, unless she is discharged to a skilled nursing facility.  Palliative care by specialist  Supportive listening and presence provided  Education and counseling provided for purpose and role of palliative care service  Post laminectomy syndrome  Modified pain-regimen:  Continue Fentanyl Transdermal Patch at 50 mcg Q72HR.  Continue Gabapentin 250 mg TID (Scheduled).  Continue Acetaminophen 320 mg 4x daily (Scheduled).  Continue Methocarbamol 500 mg 4x daily (Scheduled).   Continue Oxycodone 10 mg Q3H PRN for moderate pain and 15 mg Q3H PRN for severe pain   Posttraumatic stress disorder  Likely to benefit from safe environment and multimodal psychotropic management.  Tardive dyskinesia  Complicates ability to swallow pills; will trial liquids where possible.  Controlled substance  agreement broken  Patient was discharged from [facility] due to suspected narcotic-diversion:  Patient will not be offered any controlled-substances on discharge, unless she is discharged to a skilled nursing facility.      NARRATIVE AND INTERVAL HISTORY:       Patient seen and examined in her hospital chair.  She states that she is feeling well but has a little bit of constipation.  In the last 24 hours, she required a as needed dose of Haldol 1 mg and 2 doses of lorazepam 1 mg.  She states that she is continuing to have low back and buttocks pain.  Had a bowel movement yesterday.  No other complaints or concerns.    MEDICATIONS / ALLERGIES:     all current active meds have been reviewed, current meds:   Current Facility-Administered Medications:     acetaminophen (TYLENOL) oral suspension 320 mg, 4x Daily (with meals and at bedtime)    albuterol (PROVENTIL HFA,VENTOLIN HFA) inhaler 2 puff, Q4H PRN    buPROPion (WELLBUTRIN XL) 24 hr tablet 150 mg, Daily    clonazePAM (KlonoPIN) tablet 1 mg, TID    enoxaparin (LOVENOX) subcutaneous injection 40 mg, Daily    fentaNYL (DURAGESIC) 50 mcg/hr TD 72 hr patch 50 mcg, Q72H    gabapentin (NEURONTIN) oral solution 250 mg, TID    haloperidol (HALDOL) tablet 1 mg, BID PRN    lidocaine (LIDODERM) 5 % patch 1 patch, Daily    loratadine (CLARITIN) tablet 10 mg, Daily    LORazepam (ATIVAN) injection 1 mg, Q6H PRN    LORazepam (ATIVAN) injection 2 mg, Q10 Min PRN    methocarbamol (ROBAXIN) tablet 500 mg, 4x Daily (with meals and at bedtime)    naloxone (NARCAN) intranasal 2 mg, Daily PRN    nicotine (NICODERM CQ) 14 mg/24hr TD 24 hr patch 14 mg, Daily    nystatin (MYCOSTATIN) cream, BID    ondansetron (ZOFRAN) injection 4 mg, Q6H PRN    oxyCODONE (ROXICODONE) immediate release tablet 10 mg, Q3H PRN    oxyCODONE (ROXICODONE) IR tablet 15 mg, Q3H PRN    polyethylene glycol (MIRALAX) packet 17 g, Daily PRN    QUEtiapine (SEROquel XR) 24 hr tablet 150 mg, HS     senna (SENOKOT) tablet 17.2 mg, BID    venlafaxine (EFFEXOR-XR) 24 hr capsule 225 mg, Daily, and PTA meds:   Prior to Admission Medications   Prescriptions Last Dose Informant Patient Reported? Taking?   QUEtiapine (SEROquel) 200 mg tablet  Self No No   Sig: Take 1 tablet (200 mg total) by mouth daily at bedtime   albuterol (PROVENTIL HFA,VENTOLIN HFA) 90 mcg/act inhaler  Self Yes No   Sig: INHALE TWO PUFFS BY MOUTH EVERY 6 HOURS AS NEEDED FOR wheezing OR FOR SHORTNESS OF BREATH   Patient not taking: Reported on 2/27/2024   buprenorphine-naloxone (Suboxone) 8-2 mg  Self Yes No   Sig: PLACE THREE FILMS UNDER THE TONGUE ONCE EVERY DAY   Patient not taking: Reported on 2/27/2024   calcium carbonate (TUMS) 500 mg chewable tablet  Self No No   Sig: Chew 1 tablet (500 mg total) 3 (three) times a day as needed for indigestion or heartburn   Patient not taking: Reported on 2/27/2024   carisoprodol (SOMA) 250 MG   No No   Sig: Take 1 tablet (250 mg total) by mouth 3 (three) times a day for 10 days   cloNIDine (CATAPRES) 0.1 mg tablet  Self Yes No   Sig: Take 0.1 mg by mouth 3 (three) times a day   hydrOXYzine HCL (ATARAX) 50 mg tablet  Self Yes No   Sig: Take 50 mg by mouth 4 (four) times a day as needed   ibuprofen (MOTRIN) 400 mg tablet  Self No No   Sig: Take 1 tablet (400 mg total) by mouth every 6 (six) hours as needed for moderate pain - take with meals   nortriptyline (PAMELOR) 10 mg capsule  Self No No   Sig: Take 1 capsule (10 mg total) by mouth daily at bedtime   Patient not taking: Reported on 2/27/2024   ondansetron (ZOFRAN-ODT) 4 mg disintegrating tablet  Self Yes No   Sig: Take 4 mg by mouth every 8 (eight) hours as needed   pregabalin (LYRICA) 200 MG capsule   No No   Sig: Take 1 capsule (200 mg total) by mouth 3 (three) times a day for 10 days   venlafaxine (EFFEXOR-XR) 150 mg 24 hr capsule  Self Yes No   Sig: Take 262.5 mg by mouth daily 325mg   ziprasidone (GEODON) 20 mg capsule  Self Yes No   Sig: Take 20  "mg by mouth 2 (two) times a day   Patient not taking: Reported on 2/27/2024      Facility-Administered Medications: None       Allergies   Allergen Reactions    Other      PT \"There is some other antibiotic that I took,its a really long name. I dont remember the name\"     Pollen Extract Other (See Comments)     Sinus headache.    Sulfa Antibiotics      Unknown reaction      Cefazolin Hives and Rash       OBJECTIVE:    Physical Exam  Physical Exam  Constitutional:       General: She is not in acute distress.     Appearance: She is not ill-appearing.   HENT:      Head: Normocephalic and atraumatic.      Right Ear: External ear normal.      Left Ear: External ear normal.      Nose: Nose normal.      Mouth/Throat:      Mouth: Mucous membranes are moist.      Pharynx: Oropharynx is clear.   Eyes:      General: No scleral icterus.     Conjunctiva/sclera: Conjunctivae normal.   Cardiovascular:      Rate and Rhythm: Normal rate and regular rhythm.      Pulses: Normal pulses.   Pulmonary:      Effort: Pulmonary effort is normal. No respiratory distress.   Abdominal:      General: There is no distension.      Palpations: Abdomen is soft.      Tenderness: There is no abdominal tenderness.   Musculoskeletal:      Right lower leg: No edema.      Left lower leg: No edema.      Comments: Leg length discrepancy   Skin:     Coloration: Skin is pale. Skin is not jaundiced.   Neurological:      General: No focal deficit present.      Mental Status: She is alert. Mental status is at baseline.   Psychiatric:         Attention and Perception: Attention normal.         Mood and Affect: Mood normal.         Speech: Speech normal.         Behavior: Behavior normal. Behavior is cooperative.         Lab Results: I have personally reviewed pertinent labs., CBC: No results found for: \"WBC\", \"HGB\", \"HCT\", \"MCV\", \"PLT\", \"ADJUSTEDWBC\", \"RBC\", \"MCH\", \"MCHC\", \"RDW\", \"MPV\", \"NRBC\", CMP: No results found for: \"SODIUM\", \"K\", \"CL\", \"CO2\", \"ANIONGAP\", " "\"BUN\", \"CREATININE\", \"GLUCOSE\", \"CALCIUM\", \"AST\", \"ALT\", \"ALKPHOS\", \"PROT\", \"BILITOT\", \"EGFR\", BMP:No results found for: \"SODIUM\", \"K\", \"CL\", \"CO2\", \"BUN\", \"CREATININE\", \"GLUC\", \"GLUF\", \"CALCIUM\", \"AGAP\", \"EGFR\", PT/PTT:No results found for: \"PT\", \"PTT\"  Imaging Studies: Reviewed and interpreted  EKG, Pathology, and Other Studies: Reviewed and interpreted    I have spent a total time of 35 minutes in caring for this patient on the day of the visit/encounter including Patient and family education, Counseling / Coordination of care, Documenting in the medical record, Reviewing / ordering tests, medicine, procedures  , and Obtaining or reviewing history  .  "

## 2024-10-28 NOTE — CONSULTS
Consultation - Neuropsychology/Psychology Department  Alyssa Madrigal 64 y.o. female MRN: [id number]  Unit/Bed#: [facility] 815-01 Encounter: [id number]        Reason for Consultation:  Alyssa Madrigal is a 64 y.o. year old female who was referred for a Neuropsychological Exam to assess cognitive functioning and comment on capacity to make informed medical decisions.      History of Present Illness   Osteomyelitis    Physician Requesting Consult: Mao Martinez MD    PROBLEM LIST:  Patient Active Problem List   Diagnosis    B12 deficiency    Chronic back pain    Cocaine abuse (HCC)    Controlled substance agreement broken    COPD (chronic obstructive pulmonary disease) with chronic bronchitis (HCC)    DDD (degenerative disc disease), lumbosacral    Discitis of lumbar region    Depression    Major depressive disorder, recurrent episode with anxious distress (HCC)    Mechanical breakdown of internal orthopedic device (HCC)    Osteoarthrosis, unspecified whether generalized or localized, pelvic region and thigh    Osteomyelitis (HCC)    Post laminectomy syndrome    Posttraumatic stress disorder    Prolonged Q-T interval on ECG    Right foot drop    Tobacco abuse    Left forearm abscess    IVDU (intravenous drug user)    history of Hepatitis C    Sacral decubitus ulcer (present on admission)     Aftercare following surgery of the musculoskeletal system    Right hip pain    Fall    Palliative care by specialist    Tardive dyskinesia         Historical Information   Past Medical History:   Diagnosis Date    Back pain     Drug use     Heroin abuse (HCC)     Neck pain      Past Surgical History:   Procedure Laterality Date    BACK SURGERY      fusion    FL GUIDED NEEDLE PLAC BX/ASP/INJ  7/23/2020    FL GUIDED NEEDLE PLAC BX/ASP/INJ  7/23/2020    FL GUIDED NEEDLE PLAC BX/ASP/INJ  11/3/2020    FL GUIDED NEEDLE PLAC BX/ASP/INJ  6/25/2021    HIP SURGERY Bilateral     NECK SURGERY      fusion     Social History   Social History  "    Substance and Sexual Activity   Alcohol Use Not Currently     Social History     Substance and Sexual Activity   Drug Use Not Currently    Types: Heroin, Marijuana    Comment: heroin: 20  marijuana: 20     Social History     Tobacco Use   Smoking Status Former    Current packs/day: 0.00    Types: Cigarettes    Quit date: 2022    Years since quittin.2   Smokeless Tobacco Never     Family History: No family history on file.    Meds/Allergies   current meds:   Current Facility-Administered Medications:     acetaminophen (TYLENOL) oral suspension 320 mg, 4x Daily (with meals and at bedtime)    albuterol (PROVENTIL HFA,VENTOLIN HFA) inhaler 2 puff, Q4H PRN    buPROPion (WELLBUTRIN XL) 24 hr tablet 150 mg, Daily    clonazePAM (KlonoPIN) tablet 1 mg, TID    enoxaparin (LOVENOX) subcutaneous injection 40 mg, Daily    fentaNYL (DURAGESIC) 50 mcg/hr TD 72 hr patch 50 mcg, Q72H    gabapentin (NEURONTIN) oral solution 250 mg, TID    haloperidol (HALDOL) tablet 1 mg, BID PRN    lidocaine (LIDODERM) 5 % patch 1 patch, Daily    loratadine (CLARITIN) tablet 10 mg, Daily    LORazepam (ATIVAN) injection 1 mg, Q6H PRN    LORazepam (ATIVAN) injection 2 mg, Q10 Min PRN    methocarbamol (ROBAXIN) tablet 500 mg, 4x Daily (with meals and at bedtime)    naloxone (NARCAN) intranasal 2 mg, Daily PRN    nicotine (NICODERM CQ) 14 mg/24hr TD 24 hr patch 14 mg, Daily    nystatin (MYCOSTATIN) cream, BID    ondansetron (ZOFRAN) injection 4 mg, Q6H PRN    oxyCODONE (ROXICODONE) immediate release tablet 10 mg, Q3H PRN    oxyCODONE (ROXICODONE) IR tablet 15 mg, Q3H PRN    polyethylene glycol (MIRALAX) packet 17 g, Daily PRN    QUEtiapine (SEROquel XR) 24 hr tablet 150 mg, HS    senna (SENOKOT) tablet 17.2 mg, BID    venlafaxine (EFFEXOR-XR) 24 hr capsule 225 mg, Daily    Allergies   Allergen Reactions    Other      PT \"There is some other antibiotic that I took,its a really long name. I dont remember the name\"     Pollen Extract " Other (See Comments)     Sinus headache.    Sulfa Antibiotics      Unknown reaction      Cefazolin Hives and Rash         Family and Social Support:   No data recorded    Behavioral Observations: Patient was alert, oriented x 3 and cooperative; affect appeared pleasant and patient admitted to depressed mood and anxiety; no overt evidence of psychotic process    Cognitive Examination    General Cognitive Functioning MMSE = [id number];     Attention/Concentration Auditory Selective Attention = Average;  Auditory Vigilance = Within Normal Limits; Information Processing Speed = Within Normal Limits    Frontal Systems/Executive Functioning Mental Flexibility/Cognitive Control = Within Normal Limits; Working Memory = Within Normal Limits Abstract Reasoning = Within Normal Limits; Generative Ability = Within Normal Limits,     Language Functioning Confrontation naming = Within Normal Limits, Phonemic Fluency = Within Normal Limits; Semantic Retrieval = Impaired; Comprehension of Complex Ideational Material = Within Normal Limits; Praxis = Within Normal Limits; Repetition = Within Normal Limits; Basic Reading = Within Normal Limits; Following Commands = Within Normal Limits    Memory Functioning Three word recall = Average    Visuo-Spatial Abilities Not Assessed    Functional Knowledge  Health & Safety Knowledge = Within Normal Limits;     Summary/Impression:  Results of Neuropsychological Exam revealed adequate cognitive functioning and on a measure assessing awareness of personal health status and ability to evaluate health problems, handle medical emergencies and take safety precautions, patient performed within normal limits. During this encounter, patient appears to have capacity to make informed medical decisions.

## 2024-10-28 NOTE — CASE MANAGEMENT
"   Case Management Progress Note    Patient name Alyssa Madrigal  Location [facility] 815/[facility] 815-01 MRN [id number]  : 1960 Date 10/28/2024       LOS (days): 20  Geometric Mean LOS (GMLOS) (days): 4.2  Days to GMLOS:-15.5          PROGRESS NOTE:    CM met with pt this AM after Dr Gutierrez re-evaluated pt and deemed her to have capacity to make medical decisions.  Please see neuropsych note from 10/28.    CM discussed STR with pt and explained the difference between STR and D&A rehab.  Pt states that she is interested in STR and D&A rehab.  CM explained to pt that she will need to work on her mobility before being fit for a D&A rehab.  Pt agreeable to referrals being sent for STR with a possible transition to LTC.    CM received call from Kristyn from [facility].  Per Kristyn, pt's sister Jaycee left Kristyn a VM stating that \"pt is being d/c from the hospital today\".  CM confirmed with Kristyn that pt is NOT being d/c today.      CM met with pt to discuss communication with Jaycee.  Per pt, she does not want anyone from her treatment team talking to Jaycee, but stated that she has been in contact with Jaycee via her room phone.  CM reminded pt that she had requested Jaycee not be allowed to visit pt in hospital.  Pt states that this is still true.      "

## 2024-10-28 NOTE — PROGRESS NOTES
Progress Note - Hospitalist   Name: Alyssa Madrigal 64 y.o. female I MRN: [id number]  [facility] I Date of Admission: 10/8/2024   Date of Service: 10/28/2024 I Hospital Day: 20    Assessment & Plan  Osteomyelitis (HCC)  MRI in 7/2024  Infectious/inflammatory changes from the occiput to cervical spine as   discussed above. Likely improved fluid collection in the left posterior   paraspinal soft tissues at C1-2 level. Otherwise no significant change from   06/29/2023 including epidural soft tissue thickening and enhancement and fluid   collection along the right aspect of C1-C2 joint and prevertebral space.   Patient went to hospice and completed 3 months of doxycycline  Still complains of pain - palliative care following for assistance with pain management  Pt was discharged for [facility] hospice services due to suspected diversion of narcotics by her sister Jaycee.  Palliative care and hospice services are following  palliative care service following - changed methadone to fentanyl patch to assist with discharge planning to a facility  Patient was reevaluated by neuropsychology was found to have competency  Patient is interested in acute rehab  Case management assisting to find facility for patient    Controlled substance agreement broken  Plan noted above  COPD (chronic obstructive pulmonary disease) with chronic bronchitis (HCC)  Continue albuterol  Major depressive disorder, recurrent episode with anxious distress (HCC)  Continue venlaflaxine  IVDU (intravenous drug user)  Noted in history  Fall  Had a fall out of bed on 10/11. CT scans and Xrs are without any new traumatic injury  Fall precautions  Continue analgesics    VTE Pharmacologic Prophylaxis: VTE Score: 3 Moderate Risk (Score 3-4) - Pharmacological DVT Prophylaxis Ordered: enoxaparin (Lovenox).    Mobility:   Basic Mobility Inpatient Raw Score: 14  -[facility] Goal: 4: Move to chair/commode  -HLM Achieved: 1: Laying in bed  [facility]-HLM Goal NOT achieved.  Continue with multidisciplinary rounding and encourage appropriate mobility to improve upon -[facility] goals.    Patient Centered Rounds: I performed bedside rounds with nursing staff today.   Discussions with Specialists or Other Care Team Provider: None    Education and Discussions with Family / Patient: Patient declined call to .     Current Length of Stay: 20 day(s)  Current Patient Status: Inpatient   Certification Statement: The patient will continue to require additional inpatient hospital stay due to pending placement  Discharge Plan: Anticipate discharge in 24-48 hrs to rehab facility.    Code Status: Level 3 - DNAR and DNI    Subjective   Is a very pleasant 64-year-old female who was seen and evaluated today at bedside.  Patient denies any acute complaints this time.  Patient is very eager to go to acute rehab.    Objective :  Temp:  [97.5 °F (36.4 °C)-99.2 °F (37.3 °C)] 97.5 °F (36.4 °C)  HR:  [90] 90  BP: (119-133)/(82) 132/82  Resp:  [16-18] 16  SpO2:  [98 %] 98 %  O2 Device: None (Room air)    Body mass index is 21.31 kg/m².     Input and Output Summary (last 24 hours):     Intake/Output Summary (Last 24 hours) at 10/28/2024 1419  Last data filed at 10/28/2024 0854  Gross per 24 hour   Intake 180 ml   Output 4 ml   Net 176 ml       Physical Exam  Vitals reviewed.   Constitutional:       General: She is not in acute distress.     Appearance: She is not ill-appearing.   HENT:      Head: Normocephalic.   Eyes:      Conjunctiva/sclera: Conjunctivae normal.   Cardiovascular:      Rate and Rhythm: Normal rate and regular rhythm.   Pulmonary:      Effort: Pulmonary effort is normal.   Abdominal:      General: Abdomen is flat.      Palpations: Abdomen is soft.      Tenderness: There is no abdominal tenderness.   Skin:     General: Skin is warm and dry.   Neurological:      Mental Status: She is alert. Mental status is at baseline.           Lines/Drains:              Lab Results: I have reviewed the  following results:   Results from last 7 days   Lab Units 10/24/24  0509   WBC Thousand/uL 5.50   HEMOGLOBIN g/dL 10.7*   HEMATOCRIT % 34.9   PLATELETS Thousands/uL 347   SEGS PCT % 47   LYMPHO PCT % 37   MONO PCT % 11   EOS PCT % 4     Results from last 7 days   Lab Units 10/24/24  0509   SODIUM mmol/L 140   POTASSIUM mmol/L 3.8   CHLORIDE mmol/L 105   CO2 mmol/L 28   BUN mg/dL 15   CREATININE mg/dL 0.64   ANION GAP mmol/L 7   CALCIUM mg/dL 8.7   ALBUMIN g/dL 3.1*   TOTAL BILIRUBIN mg/dL 0.17*   ALK PHOS U/L 104   ALT U/L 19   AST U/L 16   GLUCOSE RANDOM mg/dL 82                       Recent Cultures (last 7 days):         Imaging Results Review: No pertinent imaging studies reviewed.  Other Study Results Review: No additional pertinent studies reviewed.    Last 24 Hours Medication List:     Current Facility-Administered Medications:     acetaminophen (TYLENOL) oral suspension 320 mg, 4x Daily (with meals and at bedtime)    albuterol (PROVENTIL HFA,VENTOLIN HFA) inhaler 2 puff, Q4H PRN    buPROPion (WELLBUTRIN XL) 24 hr tablet 150 mg, Daily    clonazePAM (KlonoPIN) tablet 1 mg, TID    enoxaparin (LOVENOX) subcutaneous injection 40 mg, Daily    fentaNYL (DURAGESIC) 50 mcg/hr TD 72 hr patch 50 mcg, Q72H    gabapentin (NEURONTIN) oral solution 250 mg, TID    haloperidol (HALDOL) tablet 1 mg, BID PRN    lidocaine (LIDODERM) 5 % patch 1 patch, Daily    loratadine (CLARITIN) tablet 10 mg, Daily    LORazepam (ATIVAN) injection 1 mg, Q6H PRN    LORazepam (ATIVAN) injection 2 mg, Q10 Min PRN    methocarbamol (ROBAXIN) tablet 500 mg, 4x Daily (with meals and at bedtime)    naloxone (NARCAN) intranasal 2 mg, Daily PRN    nicotine (NICODERM CQ) 14 mg/24hr TD 24 hr patch 14 mg, Daily    nystatin (MYCOSTATIN) cream, BID    ondansetron (ZOFRAN) injection 4 mg, Q6H PRN    oxyCODONE (ROXICODONE) immediate release tablet 10 mg, Q3H PRN    oxyCODONE (ROXICODONE) IR tablet 15 mg, Q3H PRN    polyethylene glycol (MIRALAX) packet 17 g,  Daily PRN    QUEtiapine (SEROquel XR) 24 hr tablet 150 mg, HS    senna (SENOKOT) tablet 17.2 mg, BID    venlafaxine (EFFEXOR-XR) 24 hr capsule 225 mg, Daily    Administrative Statements   Today, Patient Was Seen By: Mao Martinez MD  I have spent a total time of 45 minutes in caring for this patient on the day of the visit/encounter including Diagnostic results, Prognosis, Patient and family education, Impressions, Documenting in the medical record, Obtaining or reviewing history  , and Communicating with other healthcare professionals .    **Please Note: This note may have been constructed using a voice recognition system.**

## 2024-10-28 NOTE — ASSESSMENT & PLAN NOTE
Modified pain-regimen:  Continue Fentanyl Transdermal Patch at 50 mcg Q72HR.  Continue Gabapentin 250 mg TID (Scheduled).  Continue Acetaminophen 320 mg 4x daily (Scheduled).  Continue Methocarbamol 500 mg 4x daily (Scheduled).   Continue Oxycodone 10 mg Q3H PRN for moderate pain and 15 mg Q3H PRN for severe pain

## 2024-10-28 NOTE — RESTORATIVE TECHNICIAN NOTE
Restorative Technician Note      Patient Name: Alyssa Madrigal     Restorative Tech Visit Date: 10/28/24  Note Type: Mobility  Patient Position Upon Consult: Bedside chair  Activity Performed: Range of motion; Repositioned  Education Provided: Yes  Patient Position at End of Consult: Bedside chair; All needs within reach; Bed/Chair alarm activated    Lindsay Maddox  DPT, Restorative Technician

## 2024-10-28 NOTE — ASSESSMENT & PLAN NOTE
MRI Cervical Spine in July 2024: Infectious/inflammatory changes from the occiput to cervical spine, likely improved fluid collection in the left posterior paraspinal soft tissues at C1-2 level. Patient transitioned to hospice through [facility], however, was subsequently discharged due to suspected diversion of narcotics by her patient's sister (Jaycee).    Neuropsychology reevaluated the patient 10/28/2024 and deemed she has competency to make medical decisions  Discharge plan for home with home health

## 2024-10-28 NOTE — ASSESSMENT & PLAN NOTE
Supportive listening and presence provided  Education and counseling provided for purpose and role of palliative care service

## 2024-10-28 NOTE — RESTORATIVE TECHNICIAN NOTE
Restorative Technician Note      Patient Name: Alyssa Madrigal     Restorative Tech Visit Date: 10/28/24  Note Type: Mobility  Patient Position Upon Consult: Supine  Activity Performed: Range of motion  Education Provided: Yes  Patient Position at End of Consult: Supine; All needs within reach; Bed/Chair alarm activated    Lidnsay Maddox  DPT, Restorative Technician

## 2025-08-01 ENCOUNTER — APPOINTMENT (OUTPATIENT)
Dept: RADIOLOGY | Facility: CLINIC | Age: 65
End: 2025-08-01
Attending: INTERNAL MEDICINE
Payer: COMMERCIAL

## 2025-08-01 PROCEDURE — 72040 X-RAY EXAM NECK SPINE 2-3 VW: CPT
